# Patient Record
Sex: FEMALE | Race: WHITE | NOT HISPANIC OR LATINO | Employment: OTHER | URBAN - METROPOLITAN AREA
[De-identification: names, ages, dates, MRNs, and addresses within clinical notes are randomized per-mention and may not be internally consistent; named-entity substitution may affect disease eponyms.]

---

## 2017-03-10 ENCOUNTER — RECORDS - HEALTHEAST (OUTPATIENT)
Dept: LAB | Facility: HOSPITAL | Age: 52
End: 2017-03-10

## 2017-03-10 LAB
CHOLEST SERPL-MCNC: 203 MG/DL
FASTING STATUS PATIENT QL REPORTED: ABNORMAL
HDLC SERPL-MCNC: 35 MG/DL
LDLC SERPL CALC-MCNC: 148 MG/DL
TRIGL SERPL-MCNC: 102 MG/DL

## 2017-04-25 ENCOUNTER — HOSPITAL ENCOUNTER (OUTPATIENT)
Dept: INTERVENTIONAL RADIOLOGY/VASCULAR | Facility: CLINIC | Age: 52
Discharge: HOME | End: 2017-04-25
Attending: RADIOLOGY

## 2017-04-25 DIAGNOSIS — R63.30 FEEDING DIFFICULTIES: ICD-10-CM

## 2017-09-08 ENCOUNTER — RECORDS - HEALTHEAST (OUTPATIENT)
Dept: ADMINISTRATIVE | Facility: OTHER | Age: 52
End: 2017-09-08

## 2017-09-11 ENCOUNTER — HOSPITAL ENCOUNTER (OUTPATIENT)
Dept: INTERVENTIONAL RADIOLOGY/VASCULAR | Facility: HOSPITAL | Age: 52
Discharge: HOME | End: 2017-09-11
Attending: RADIOLOGY

## 2017-09-11 DIAGNOSIS — E46 MALNUTRITION (H): ICD-10-CM

## 2017-11-27 ENCOUNTER — RECORDS - HEALTHEAST (OUTPATIENT)
Dept: ADMINISTRATIVE | Facility: OTHER | Age: 52
End: 2017-11-27

## 2017-12-11 ENCOUNTER — HOSPITAL ENCOUNTER (OUTPATIENT)
Dept: INTERVENTIONAL RADIOLOGY/VASCULAR | Facility: HOSPITAL | Age: 52
Discharge: HOME | End: 2017-12-11
Attending: RADIOLOGY

## 2017-12-11 DIAGNOSIS — E46 MALNUTRITION (H): ICD-10-CM

## 2018-02-07 ENCOUNTER — RECORDS - HEALTHEAST (OUTPATIENT)
Dept: LAB | Facility: HOSPITAL | Age: 53
End: 2018-02-07

## 2018-02-08 LAB — LAMOTRIGINE SERPL-MCNC: 10.5 UG/ML (ref 2.5–15)

## 2018-02-12 ENCOUNTER — HOSPITAL ENCOUNTER (OUTPATIENT)
Dept: INTERVENTIONAL RADIOLOGY/VASCULAR | Facility: HOSPITAL | Age: 53
Discharge: HOME | End: 2018-02-12
Attending: RADIOLOGY | Admitting: RADIOLOGY

## 2018-02-12 ENCOUNTER — RECORDS - HEALTHEAST (OUTPATIENT)
Dept: ADMINISTRATIVE | Facility: OTHER | Age: 53
End: 2018-02-12

## 2018-02-12 DIAGNOSIS — R63.30 FEEDING DIFFICULTIES: ICD-10-CM

## 2018-02-13 ENCOUNTER — RECORDS - HEALTHEAST (OUTPATIENT)
Dept: LAB | Facility: HOSPITAL | Age: 53
End: 2018-02-13

## 2018-02-13 LAB
ALBUMIN SERPL-MCNC: 3.7 G/DL (ref 3.5–5)
ALP SERPL-CCNC: 217 U/L (ref 45–120)
ALT SERPL W P-5'-P-CCNC: <9 U/L (ref 0–45)
ANION GAP SERPL CALCULATED.3IONS-SCNC: 9 MMOL/L (ref 5–18)
AST SERPL W P-5'-P-CCNC: 21 U/L (ref 0–40)
BILIRUB SERPL-MCNC: 0.4 MG/DL (ref 0–1)
BUN SERPL-MCNC: 17 MG/DL (ref 8–22)
CALCIUM SERPL-MCNC: 10.2 MG/DL (ref 8.5–10.5)
CHLORIDE BLD-SCNC: 105 MMOL/L (ref 98–107)
CO2 SERPL-SCNC: 26 MMOL/L (ref 22–31)
CREAT SERPL-MCNC: 0.54 MG/DL (ref 0.6–1.1)
ERYTHROCYTE [DISTWIDTH] IN BLOOD BY AUTOMATED COUNT: 14.1 % (ref 11–14.5)
GFR SERPL CREATININE-BSD FRML MDRD: >60 ML/MIN/1.73M2
GLUCOSE BLD-MCNC: 97 MG/DL (ref 70–125)
HCT VFR BLD AUTO: 44.3 % (ref 35–47)
HGB BLD-MCNC: 14.4 G/DL (ref 12–16)
LEVETIRACETAM (KEPPRA): 21.5 UG/ML (ref 6–46)
MCH RBC QN AUTO: 30.7 PG (ref 27–34)
MCHC RBC AUTO-ENTMCNC: 32.5 G/DL (ref 32–36)
MCV RBC AUTO: 95 FL (ref 80–100)
PLATELET # BLD AUTO: 237 THOU/UL (ref 140–440)
PMV BLD AUTO: 11.5 FL (ref 8.5–12.5)
POTASSIUM BLD-SCNC: 3.8 MMOL/L (ref 3.5–5)
PROT SERPL-MCNC: 7.9 G/DL (ref 6–8)
RBC # BLD AUTO: 4.69 MILL/UL (ref 3.8–5.4)
SODIUM SERPL-SCNC: 140 MMOL/L (ref 136–145)
WBC: 6.7 THOU/UL (ref 4–11)

## 2018-02-14 LAB
LAMOTRIGINE SERPL-MCNC: 8.2 UG/ML (ref 2.5–15)
TOPIRAMATE SERPL-MCNC: 10.5 UG/ML (ref 5–20)

## 2018-02-17 ENCOUNTER — RECORDS - HEALTHEAST (OUTPATIENT)
Dept: LAB | Facility: HOSPITAL | Age: 53
End: 2018-02-17

## 2018-02-17 LAB
ALBUMIN SERPL-MCNC: 3.6 G/DL (ref 3.5–5)
ALP SERPL-CCNC: 231 U/L (ref 45–120)
ALT SERPL W P-5'-P-CCNC: 11 U/L (ref 0–45)
AST SERPL W P-5'-P-CCNC: 25 U/L (ref 0–40)
BILIRUB DIRECT SERPL-MCNC: <0.1 MG/DL
BILIRUB SERPL-MCNC: 0.3 MG/DL (ref 0–1)
PROT SERPL-MCNC: 7.1 G/DL (ref 6–8)

## 2018-04-20 ENCOUNTER — RECORDS - HEALTHEAST (OUTPATIENT)
Dept: LAB | Facility: CLINIC | Age: 53
End: 2018-04-20

## 2018-04-24 ENCOUNTER — RECORDS - HEALTHEAST (OUTPATIENT)
Dept: ADMINISTRATIVE | Facility: OTHER | Age: 53
End: 2018-04-24

## 2018-04-24 ENCOUNTER — AMBULATORY - HEALTHEAST (OUTPATIENT)
Dept: SCHEDULING | Facility: CLINIC | Age: 53
End: 2018-04-24

## 2018-04-24 DIAGNOSIS — M81.0 OSTEOPOROSIS: ICD-10-CM

## 2018-04-25 LAB
T4 FREE SERPL-MCNC: 1.1 NG/DL (ref 0.7–1.8)
TSH SERPL DL<=0.005 MIU/L-ACNC: 2.13 UIU/ML (ref 0.3–5)

## 2018-05-01 ENCOUNTER — RECORDS - HEALTHEAST (OUTPATIENT)
Dept: LAB | Facility: HOSPITAL | Age: 53
End: 2018-05-01

## 2018-05-01 LAB
IRON SATN MFR SERPL: 20 % (ref 20–50)
IRON SERPL-MCNC: 62 UG/DL (ref 42–175)
TIBC SERPL-MCNC: 309 UG/DL (ref 313–563)
TRANSFERRIN SERPL-MCNC: 248 MG/DL (ref 212–360)

## 2018-05-16 ENCOUNTER — HOSPITAL ENCOUNTER (OUTPATIENT)
Dept: INTERVENTIONAL RADIOLOGY/VASCULAR | Facility: HOSPITAL | Age: 53
Discharge: HOME | End: 2018-05-16
Attending: NURSE PRACTITIONER | Admitting: RADIOLOGY

## 2018-05-16 DIAGNOSIS — E46 MALNUTRITION, UNSPECIFIED TYPE (H): ICD-10-CM

## 2018-08-27 ENCOUNTER — HOSPITAL ENCOUNTER (OUTPATIENT)
Dept: INTERVENTIONAL RADIOLOGY/VASCULAR | Facility: HOSPITAL | Age: 53
Discharge: HOME | End: 2018-08-27
Attending: RADIOLOGY | Admitting: RADIOLOGY

## 2018-08-27 DIAGNOSIS — E46 MALNUTRITION (H): ICD-10-CM

## 2018-10-22 ENCOUNTER — RECORDS - HEALTHEAST (OUTPATIENT)
Dept: LAB | Facility: HOSPITAL | Age: 53
End: 2018-10-22

## 2018-10-22 LAB — LEVETIRACETAM (KEPPRA): 33.2 UG/ML (ref 6–46)

## 2018-10-27 ENCOUNTER — RECORDS - HEALTHEAST (OUTPATIENT)
Dept: LAB | Facility: HOSPITAL | Age: 53
End: 2018-10-27

## 2018-10-27 LAB
ALBUMIN UR-MCNC: NEGATIVE MG/DL
AMORPH CRY #/AREA URNS HPF: ABNORMAL /[HPF]
APPEARANCE UR: CLEAR
BACTERIA #/AREA URNS HPF: ABNORMAL HPF
BILIRUB UR QL STRIP: NEGATIVE
COLOR UR AUTO: YELLOW
GLUCOSE UR STRIP-MCNC: NEGATIVE MG/DL
HGB UR QL STRIP: NEGATIVE
KETONES UR STRIP-MCNC: NEGATIVE MG/DL
LEUKOCYTE ESTERASE UR QL STRIP: ABNORMAL
NITRATE UR QL: NEGATIVE
PH UR STRIP: 7.5 [PH] (ref 4.5–8)
RBC #/AREA URNS AUTO: ABNORMAL HPF
SP GR UR STRIP: 1.01 (ref 1–1.03)
SQUAMOUS #/AREA URNS AUTO: ABNORMAL LPF
UROBILINOGEN UR STRIP-ACNC: ABNORMAL
WBC #/AREA URNS AUTO: ABNORMAL HPF

## 2018-10-30 ENCOUNTER — AMBULATORY - HEALTHEAST (OUTPATIENT)
Dept: LAB | Facility: HOSPITAL | Age: 53
End: 2018-10-30

## 2018-10-30 ENCOUNTER — RECORDS - HEALTHEAST (OUTPATIENT)
Dept: LAB | Facility: HOSPITAL | Age: 53
End: 2018-10-30

## 2018-10-30 DIAGNOSIS — N31.9 NEUROGENIC DYSFUNCTION OF THE URINARY BLADDER: ICD-10-CM

## 2018-10-30 LAB
BACTERIA SPEC CULT: ABNORMAL
BACTERIA SPEC CULT: ABNORMAL
CREAT SERPL-MCNC: 0.52 MG/DL (ref 0.6–1.1)
GFR SERPL CREATININE-BSD FRML MDRD: >60 ML/MIN/1.73M2
IRON SATN MFR SERPL: 25 % (ref 20–50)
IRON SERPL-MCNC: 80 UG/DL (ref 42–175)
TIBC SERPL-MCNC: 315 UG/DL (ref 313–563)
TRANSFERRIN SERPL-MCNC: 252 MG/DL (ref 212–360)

## 2018-11-08 ENCOUNTER — RECORDS - HEALTHEAST (OUTPATIENT)
Dept: LAB | Facility: HOSPITAL | Age: 53
End: 2018-11-08

## 2018-11-08 LAB — LEVETIRACETAM (KEPPRA): 50.3 UG/ML (ref 6–46)

## 2018-11-09 LAB
LAMOTRIGINE SERPL-MCNC: 9.1 UG/ML (ref 2.5–15)
TOPIRAMATE SERPL-MCNC: 8.5 UG/ML (ref 5–20)

## 2018-11-13 ENCOUNTER — HOSPITAL ENCOUNTER (OUTPATIENT)
Dept: INTERVENTIONAL RADIOLOGY/VASCULAR | Facility: HOSPITAL | Age: 53
Discharge: HOME | End: 2018-11-13
Attending: RADIOLOGY | Admitting: RADIOLOGY

## 2018-11-13 DIAGNOSIS — R63.30 FEEDING DIFFICULTIES: ICD-10-CM

## 2019-01-08 ENCOUNTER — HOSPITAL ENCOUNTER (OUTPATIENT)
Dept: INTERVENTIONAL RADIOLOGY/VASCULAR | Facility: CLINIC | Age: 54
Discharge: HOME | End: 2019-01-08
Attending: RADIOLOGY | Admitting: RADIOLOGY

## 2019-01-08 DIAGNOSIS — E46 MALNUTRITION (H): ICD-10-CM

## 2019-02-06 ENCOUNTER — RECORDS - HEALTHEAST (OUTPATIENT)
Dept: LAB | Facility: HOSPITAL | Age: 54
End: 2019-02-06

## 2019-02-07 LAB — LAMOTRIGINE SERPL-MCNC: 8.5 UG/ML (ref 2.5–15)

## 2019-02-11 ENCOUNTER — RECORDS - HEALTHEAST (OUTPATIENT)
Dept: LAB | Facility: HOSPITAL | Age: 54
End: 2019-02-11

## 2019-02-11 LAB
ALBUMIN SERPL-MCNC: 3.6 G/DL (ref 3.5–5)
ALBUMIN SERPL-MCNC: 3.6 G/DL (ref 3.5–5)
ALP SERPL-CCNC: 261 U/L (ref 45–120)
ALT SERPL W P-5'-P-CCNC: 20 U/L (ref 0–45)
AMMONIA PLAS-SCNC: 67 UMOL/L (ref 11–35)
ANION GAP SERPL CALCULATED.3IONS-SCNC: 7 MMOL/L (ref 5–18)
AST SERPL W P-5'-P-CCNC: 35 U/L (ref 0–40)
BASOPHILS # BLD AUTO: 0.1 THOU/UL (ref 0–0.2)
BASOPHILS NFR BLD AUTO: 1 % (ref 0–2)
BILIRUB DIRECT SERPL-MCNC: <0.1 MG/DL
BILIRUB SERPL-MCNC: 0.2 MG/DL (ref 0–1)
BUN SERPL-MCNC: 23 MG/DL (ref 8–22)
CALCIUM SERPL-MCNC: 10.1 MG/DL (ref 8.5–10.5)
CHLORIDE BLD-SCNC: 107 MMOL/L (ref 98–107)
CHOLEST SERPL-MCNC: 181 MG/DL
CO2 SERPL-SCNC: 26 MMOL/L (ref 22–31)
CREAT SERPL-MCNC: 0.54 MG/DL (ref 0.6–1.1)
EOSINOPHIL # BLD AUTO: 0.2 THOU/UL (ref 0–0.4)
EOSINOPHIL NFR BLD AUTO: 3 % (ref 0–6)
ERYTHROCYTE [DISTWIDTH] IN BLOOD BY AUTOMATED COUNT: 13.2 % (ref 11–14.5)
FASTING STATUS PATIENT QL REPORTED: NO
GFR SERPL CREATININE-BSD FRML MDRD: >60 ML/MIN/1.73M2
GLUCOSE BLD-MCNC: 96 MG/DL (ref 70–125)
HCT VFR BLD AUTO: 42 % (ref 35–47)
HDLC SERPL-MCNC: 30 MG/DL
HGB BLD-MCNC: 13.9 G/DL (ref 12–16)
LDLC SERPL CALC-MCNC: 131 MG/DL
LYMPHOCYTES # BLD AUTO: 1.6 THOU/UL (ref 0.8–4.4)
LYMPHOCYTES NFR BLD AUTO: 30 % (ref 20–40)
MCH RBC QN AUTO: 31.7 PG (ref 27–34)
MCHC RBC AUTO-ENTMCNC: 33.1 G/DL (ref 32–36)
MCV RBC AUTO: 96 FL (ref 80–100)
MONOCYTES # BLD AUTO: 0.5 THOU/UL (ref 0–0.9)
MONOCYTES NFR BLD AUTO: 8 % (ref 2–10)
NEUTROPHILS # BLD AUTO: 3.2 THOU/UL (ref 2–7.7)
NEUTROPHILS NFR BLD AUTO: 58 % (ref 50–70)
PHOSPHATE SERPL-MCNC: 3.1 MG/DL (ref 2.5–4.5)
PLATELET # BLD AUTO: 262 THOU/UL (ref 140–440)
PMV BLD AUTO: 11.5 FL (ref 8.5–12.5)
POTASSIUM BLD-SCNC: 3.8 MMOL/L (ref 3.5–5)
PROT SERPL-MCNC: 7.4 G/DL (ref 6–8)
RBC # BLD AUTO: 4.38 MILL/UL (ref 3.8–5.4)
SODIUM SERPL-SCNC: 140 MMOL/L (ref 136–145)
TRIGL SERPL-MCNC: 101 MG/DL
WBC: 5.5 THOU/UL (ref 4–11)

## 2019-03-20 ENCOUNTER — RECORDS - HEALTHEAST (OUTPATIENT)
Dept: LAB | Facility: HOSPITAL | Age: 54
End: 2019-03-20

## 2019-03-20 LAB
ALBUMIN SERPL-MCNC: 3.7 G/DL (ref 3.5–5)
ALBUMIN SERPL-MCNC: 3.7 G/DL (ref 3.5–5)
ALP SERPL-CCNC: 270 U/L (ref 45–120)
ALT SERPL W P-5'-P-CCNC: 23 U/L (ref 0–45)
AMMONIA PLAS-SCNC: 41 UMOL/L (ref 11–35)
ANION GAP SERPL CALCULATED.3IONS-SCNC: 8 MMOL/L (ref 5–18)
AST SERPL W P-5'-P-CCNC: 42 U/L (ref 0–40)
BILIRUB DIRECT SERPL-MCNC: 0.1 MG/DL
BILIRUB SERPL-MCNC: 0.3 MG/DL (ref 0–1)
BUN SERPL-MCNC: 19 MG/DL (ref 8–22)
CALCIUM SERPL-MCNC: 10.2 MG/DL (ref 8.5–10.5)
CHLORIDE BLD-SCNC: 106 MMOL/L (ref 98–107)
CHOLEST SERPL-MCNC: 191 MG/DL
CO2 SERPL-SCNC: 26 MMOL/L (ref 22–31)
CREAT SERPL-MCNC: 0.54 MG/DL (ref 0.6–1.1)
ERYTHROCYTE [DISTWIDTH] IN BLOOD BY AUTOMATED COUNT: 13.4 % (ref 11–14.5)
FASTING STATUS PATIENT QL REPORTED: ABNORMAL
GFR SERPL CREATININE-BSD FRML MDRD: >60 ML/MIN/1.73M2
GLUCOSE BLD-MCNC: 100 MG/DL (ref 70–125)
HCT VFR BLD AUTO: 42.9 % (ref 35–47)
HDLC SERPL-MCNC: 36 MG/DL
HGB BLD-MCNC: 14.4 G/DL (ref 12–16)
LDLC SERPL CALC-MCNC: 134 MG/DL
MCH RBC QN AUTO: 31.6 PG (ref 27–34)
MCHC RBC AUTO-ENTMCNC: 33.6 G/DL (ref 32–36)
MCV RBC AUTO: 94 FL (ref 80–100)
PHOSPHATE SERPL-MCNC: 3.7 MG/DL (ref 2.5–4.5)
PLATELET # BLD AUTO: 214 THOU/UL (ref 140–440)
PMV BLD AUTO: 11.2 FL (ref 8.5–12.5)
POTASSIUM BLD-SCNC: 4 MMOL/L (ref 3.5–5)
PROT SERPL-MCNC: 7.4 G/DL (ref 6–8)
RBC # BLD AUTO: 4.55 MILL/UL (ref 3.8–5.4)
SODIUM SERPL-SCNC: 140 MMOL/L (ref 136–145)
TRIGL SERPL-MCNC: 105 MG/DL
WBC: 6.2 THOU/UL (ref 4–11)

## 2019-04-22 ENCOUNTER — RECORDS - HEALTHEAST (OUTPATIENT)
Dept: ADMINISTRATIVE | Facility: OTHER | Age: 54
End: 2019-04-22

## 2019-04-29 ENCOUNTER — HOSPITAL ENCOUNTER (OUTPATIENT)
Dept: INTERVENTIONAL RADIOLOGY/VASCULAR | Facility: HOSPITAL | Age: 54
Discharge: HOME | End: 2019-04-29
Attending: RADIOLOGY | Admitting: RADIOLOGY

## 2019-04-29 ENCOUNTER — RECORDS - HEALTHEAST (OUTPATIENT)
Dept: ADMINISTRATIVE | Facility: OTHER | Age: 54
End: 2019-04-29

## 2019-04-29 DIAGNOSIS — E46 MALNUTRITION (H): ICD-10-CM

## 2019-04-30 ENCOUNTER — COMMUNICATION - HEALTHEAST (OUTPATIENT)
Dept: ADMINISTRATIVE | Facility: CLINIC | Age: 54
End: 2019-04-30

## 2019-04-30 ENCOUNTER — RECORDS - HEALTHEAST (OUTPATIENT)
Dept: LAB | Facility: HOSPITAL | Age: 54
End: 2019-04-30

## 2019-04-30 LAB
IRON SATN MFR SERPL: 20 % (ref 20–50)
IRON SERPL-MCNC: 61 UG/DL (ref 42–175)
TIBC SERPL-MCNC: 306 UG/DL (ref 313–563)
TRANSFERRIN SERPL-MCNC: 245 MG/DL (ref 212–360)

## 2019-05-01 ENCOUNTER — RECORDS - HEALTHEAST (OUTPATIENT)
Dept: ADMINISTRATIVE | Facility: OTHER | Age: 54
End: 2019-05-01

## 2019-05-08 ENCOUNTER — RECORDS - HEALTHEAST (OUTPATIENT)
Dept: LAB | Facility: CLINIC | Age: 54
End: 2019-05-08

## 2019-05-09 LAB
ALBUMIN SERPL-MCNC: 3.8 G/DL (ref 3.5–5)
ALP SERPL-CCNC: 272 U/L (ref 45–120)
ALT SERPL W P-5'-P-CCNC: 30 U/L (ref 0–45)
ANION GAP SERPL CALCULATED.3IONS-SCNC: 8 MMOL/L (ref 5–18)
AST SERPL W P-5'-P-CCNC: 51 U/L (ref 0–40)
BILIRUB SERPL-MCNC: 0.2 MG/DL (ref 0–1)
BUN SERPL-MCNC: 17 MG/DL (ref 8–22)
CALCIUM SERPL-MCNC: 10.6 MG/DL (ref 8.5–10.5)
CHLORIDE BLD-SCNC: 106 MMOL/L (ref 98–107)
CHOLEST SERPL-MCNC: 211 MG/DL
CO2 SERPL-SCNC: 27 MMOL/L (ref 22–31)
CREAT SERPL-MCNC: 0.55 MG/DL (ref 0.6–1.1)
ERYTHROCYTE [DISTWIDTH] IN BLOOD BY AUTOMATED COUNT: 13.3 % (ref 11–14.5)
FASTING STATUS PATIENT QL REPORTED: ABNORMAL
GFR SERPL CREATININE-BSD FRML MDRD: >60 ML/MIN/1.73M2
GLUCOSE BLD-MCNC: 90 MG/DL (ref 70–125)
HCT VFR BLD AUTO: 46.3 % (ref 35–47)
HDLC SERPL-MCNC: 41 MG/DL
HGB BLD-MCNC: 15.1 G/DL (ref 12–16)
LDLC SERPL CALC-MCNC: 148 MG/DL
MCH RBC QN AUTO: 31.9 PG (ref 27–34)
MCHC RBC AUTO-ENTMCNC: 32.6 G/DL (ref 32–36)
MCV RBC AUTO: 98 FL (ref 80–100)
PLATELET # BLD AUTO: 227 THOU/UL (ref 140–440)
PMV BLD AUTO: 11.7 FL (ref 8.5–12.5)
POTASSIUM BLD-SCNC: 4.2 MMOL/L (ref 3.5–5)
PROT SERPL-MCNC: 7.8 G/DL (ref 6–8)
RBC # BLD AUTO: 4.73 MILL/UL (ref 3.8–5.4)
SODIUM SERPL-SCNC: 141 MMOL/L (ref 136–145)
TRIGL SERPL-MCNC: 111 MG/DL
TSH SERPL DL<=0.005 MIU/L-ACNC: 3 UIU/ML (ref 0.3–5)
WBC: 5.2 THOU/UL (ref 4–11)

## 2019-06-11 ENCOUNTER — AMBULATORY - HEALTHEAST (OUTPATIENT)
Dept: ENDOCRINOLOGY | Facility: CLINIC | Age: 54
End: 2019-06-11

## 2019-06-11 DIAGNOSIS — M85.80 OSTEOPENIA, UNSPECIFIED LOCATION: ICD-10-CM

## 2019-06-25 ENCOUNTER — RECORDS - HEALTHEAST (OUTPATIENT)
Dept: ADMINISTRATIVE | Facility: OTHER | Age: 54
End: 2019-06-25

## 2019-06-25 ENCOUNTER — HOSPITAL ENCOUNTER (OUTPATIENT)
Dept: INTERVENTIONAL RADIOLOGY/VASCULAR | Facility: HOSPITAL | Age: 54
Discharge: HOME | End: 2019-06-25
Attending: RADIOLOGY | Admitting: RADIOLOGY

## 2019-06-25 DIAGNOSIS — E46 MALNUTRITION (H): ICD-10-CM

## 2019-07-09 ENCOUNTER — ALLIED HEALTH/NURSE VISIT (OUTPATIENT)
Dept: PHARMACY | Facility: PHYSICIAN GROUP | Age: 54
End: 2019-07-09
Payer: MEDICAID

## 2019-07-09 DIAGNOSIS — K21.9 GASTROESOPHAGEAL REFLUX DISEASE WITHOUT ESOPHAGITIS: ICD-10-CM

## 2019-07-09 DIAGNOSIS — R82.71 BACTERIA IN URINE: ICD-10-CM

## 2019-07-09 DIAGNOSIS — R56.9 SEIZURES (H): Primary | ICD-10-CM

## 2019-07-09 DIAGNOSIS — H04.123 DRY EYES: ICD-10-CM

## 2019-07-09 DIAGNOSIS — Z78.9 TAKES DIETARY SUPPLEMENTS: ICD-10-CM

## 2019-07-09 DIAGNOSIS — E03.9 HYPOTHYROIDISM, UNSPECIFIED TYPE: ICD-10-CM

## 2019-07-09 DIAGNOSIS — L29.9 ITCHING: ICD-10-CM

## 2019-07-09 DIAGNOSIS — R52 PAIN: ICD-10-CM

## 2019-07-09 DIAGNOSIS — K59.00 CONSTIPATION, UNSPECIFIED CONSTIPATION TYPE: ICD-10-CM

## 2019-07-09 PROCEDURE — 99605 MTMS BY PHARM NP 15 MIN: CPT | Performed by: PHARMACIST

## 2019-07-09 PROCEDURE — 99607 MTMS BY PHARM ADDL 15 MIN: CPT | Performed by: PHARMACIST

## 2019-07-09 RX ORDER — DESONIDE 0.5 MG/G
CREAM TOPICAL 2 TIMES DAILY
COMMUNITY

## 2019-07-09 RX ORDER — MULTIPLE VITAMINS W/ MINERALS TAB 9MG-400MCG
1 TAB ORAL DAILY
COMMUNITY

## 2019-07-09 RX ORDER — LAMOTRIGINE 25 MG/1
75 TABLET ORAL 2 TIMES DAILY
COMMUNITY
End: 2021-04-15

## 2019-07-09 RX ORDER — GUAIFENESIN AND DEXTROMETHORPHAN HYDROBROMIDE 100; 10 MG/5ML; MG/5ML
10 SOLUTION ORAL EVERY 6 HOURS PRN
COMMUNITY

## 2019-07-09 RX ORDER — CALCIUM CARBONATE 1250 MG/5ML
600 SUSPENSION ORAL DAILY
COMMUNITY
End: 2024-03-05

## 2019-07-09 RX ORDER — LEVOTHYROXINE SODIUM 50 UG/1
50 TABLET ORAL DAILY
COMMUNITY

## 2019-07-09 RX ORDER — BENZOCAINE/MENTHOL 6 MG-10 MG
LOZENGE MUCOUS MEMBRANE 2 TIMES DAILY PRN
COMMUNITY
End: 2024-03-05

## 2019-07-09 RX ORDER — ACETAMINOPHEN 325 MG/1
650 TABLET ORAL EVERY 4 HOURS PRN
COMMUNITY

## 2019-07-09 RX ORDER — BISACODYL 10 MG
10 SUPPOSITORY, RECTAL RECTAL EVERY OTHER DAY
COMMUNITY

## 2019-07-09 RX ORDER — NYSTATIN 100000 U/G
CREAM TOPICAL 3 TIMES DAILY PRN
COMMUNITY
End: 2023-10-26

## 2019-07-09 RX ORDER — DIPHENHYDRAMINE HCL 12.5MG/5ML
25 LIQUID (ML) ORAL EVERY 4 HOURS PRN
COMMUNITY

## 2019-07-09 RX ORDER — TOPIRAMATE 200 MG/1
200 TABLET, FILM COATED ORAL 2 TIMES DAILY
COMMUNITY
End: 2021-03-17

## 2019-07-09 RX ORDER — NUTRITIONAL SUPPLEMENT
1 PACKET (EA) ORAL 2 TIMES DAILY
COMMUNITY

## 2019-07-09 RX ORDER — DIAZEPAM ORAL SOLUTION (CONCENTRATE) 5 MG/ML
5 SOLUTION ORAL EVERY 8 HOURS PRN
COMMUNITY

## 2019-07-09 RX ORDER — FOLIC ACID 1 MG/1
1 TABLET ORAL DAILY
COMMUNITY

## 2019-07-09 RX ORDER — CARBOXYMETHYLCELLULOSE SODIUM 5 MG/ML
1 SOLUTION/ DROPS OPHTHALMIC AT BEDTIME
COMMUNITY

## 2019-07-09 RX ORDER — LEVETIRACETAM 100 MG/ML
1000 SOLUTION ORAL 2 TIMES DAILY
COMMUNITY
End: 2021-03-17

## 2019-07-09 RX ORDER — WHEY PROTEIN ISOLATE 6 G-25/7 G
7 POWDER (GRAM) ORAL 2 TIMES DAILY
COMMUNITY

## 2019-07-09 RX ORDER — CLONAZEPAM 0.5 MG/1
0.25 TABLET ORAL AT BEDTIME
COMMUNITY
End: 2024-03-05

## 2019-07-09 RX ORDER — POLYETHYLENE GLYCOL 3350 17 G/17G
1 POWDER, FOR SOLUTION ORAL 2 TIMES DAILY PRN
COMMUNITY
End: 2024-03-05

## 2019-07-09 RX ORDER — LAMOTRIGINE 200 MG/1
200 TABLET ORAL 2 TIMES DAILY
COMMUNITY
End: 2021-03-17

## 2019-07-09 RX ORDER — NITROFURANTOIN 25 MG/5ML
100 SUSPENSION ORAL 4 TIMES DAILY
COMMUNITY
End: 2024-03-05

## 2019-07-09 NOTE — PROGRESS NOTES
SUBJECTIVE/OBJECTIVE:                Elysia Arellano is a 53 year old female called for a transitions of care visit.  She was discharged from [facility] on 7/5 for breakthrough seizure.  Spoke with nurse Zack, he had little time today but sent me her the list of her current orders for medications.      Chief Complaint: Medication review.    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 Forman  Fecal impaction in past  Scoliosis  GERD - S/P Nissen  Mental retardation, severe. Cerebral palsy  Cerebral Palsy  severe osteoporosis  recurrent pneumonia and UTIs    Medication Adherence/Access:  Medications are administered by group home staff.    Seizures:   Currently taking Topiramate 200mg daily (new),  Levetiracetam 1000mg BID, Lamotrigine 275mg BID, Clonazepam 0.25mg HS.  She also has Diazepam intensol 5mg PRN for seizure, and Diazepam 10mg rectal kit PRN.  She hasn't has any seizures since her discharge to home.      Bacteriuria:   Currently taking Nitrofurantoin 100mg QID.  It was noted that the seizure could have been caused by UTI, so nitrofurantoin was started.    Hypothyroidism:   Patient is taking levothyroxine 50 mcg daily. Patient is having the following symptoms: none.    TSH - 05/09/2019      NAME VALUE REFERENCE RANGE    THYROID STIMULATING HORMONE 3.00        GERD:    Currently taking ranitidine 150mg BID.   Unkown if she is having symptoms of GERD.    Constipation/GI:    Currently taking Fleet enema every other day if suppository not effective, Miralax powder 17g BID PRN, Bisacodyl 10mg suppository every other day.  The nurse I spoke with wasn't sure of how often she is having bowel movements.    Itching/Rash:   Benadryl 25mg every  4 hours PRN, Hydrocortisone 1% BID PRN, Desonide 0.05%  BID PRN for rash, Nystatin cream TID to face and BID to G-tube site.  The nurse didn't know how often she has been using these PRN topicals or Benadryl.  He didn't have access to the MAR at the time.    Nutrition/G-Tube: Currently taking via G-tube Cerovite multivtamin daily, Vitamin D3 2000 international unit(s) daily, calcium carbonate 1500mg daily, Arginaid 1 packet daily, Genfiber 2 tbs BID, and folic acid 1 mg daily.    Pain:   Currently taking acetaminophen 650mg every 4 hours PRN for pain.  UnknoPMwn how often this is given.    Dry Eye:    Currently taking Refresh liquigel 1 drop HS.      Today's Vitals: There were no vitals taken for this visit. - telephone encounter.      ASSESSMENT:                 Current medications were reviewed today.      Medication Adherence: excellent, no issues identified    Seizures:   Stable, follow up scheduled.    Unclear how they are administering the topiramate tablets, but it is recommended to crush the tablets and administer with water.    Bacteriuria:   Plan in place.  No concerns with drug interactions.  All medications are administered appropriately via G-tube.  Nitrofurantoin capsules can be opened and the powder administered via G-tube.  The pt was prescribed the liquid formulation, but it being given the capsules at her group home.    Hypothyroidism:   Stable. Last TSH is within normal limits.       GERD:    Unknown.  There is a liquid formulation of ranitidine available if preferred.    Constipation/GI:    Unknown if stable.    Itching/Rash:   Stable.  There is a drug interaction between benadryl and topiramate, requires additional monitoring but no adjustment in therapy at this time.   Antihistamines may potentiate the risk of oligohidrosis and hyperthermia associated occasionally with the use of topiramate    Nutrition/G-Tube:   Stable.    Pain:   Stable.    Dry Eye:    Stable.  Pt is being given Refresh  Ligui-gel drops, and her prescription is for the Refresh PM ointment.  If she is still having dry eye could correct this discrepancy.         PLAN:                  Post Discharge Medication Reconciliation Status: discharge medications reconciled, continue medications without change.    1) Continue current medications.    2) Monitor for symptoms of oligohidrosis and hyperthermia due to drug interaction between Benadryl and topiramate.  Benadryl can increase the risk of these side effects occurring, but likely doesn't need a change at this time.    Just an FYI -- Noted a few discrepancies for which medications the pt is getting.  Pt is getting Refresh liqui-gel drops instead of ointment, which may not be as effective.  Pt is getting nitrofurantoin capsules, and the liquid formulation was ordered.  She is getting the same dose, and the capsules can be opened and given via G-tube      I spent 60 minutes with this patient today. I offer these suggestions for consideration by Dr. Clement. A copy of the visit note was provided to the patient's primary care provider.    No follow up needed from [facility].    The patient declined a summary of these recommendations as an after visit summary.    Olga Magana, PharmD  Medication Therapy Management Pharmacist  Pager: [phone]

## 2019-07-11 ENCOUNTER — AMBULATORY - HEALTHEAST (OUTPATIENT)
Dept: OTHER | Facility: CLINIC | Age: 54
End: 2019-07-11

## 2019-07-11 ENCOUNTER — COMMUNICATION - HEALTHEAST (OUTPATIENT)
Dept: ENDOCRINOLOGY | Facility: CLINIC | Age: 54
End: 2019-07-11

## 2019-07-11 ENCOUNTER — DOCUMENTATION ONLY (OUTPATIENT)
Dept: OTHER | Facility: CLINIC | Age: 54
End: 2019-07-11

## 2019-07-19 ENCOUNTER — RECORDS - HEALTHEAST (OUTPATIENT)
Dept: LAB | Facility: HOSPITAL | Age: 54
End: 2019-07-19

## 2019-07-19 LAB
ALBUMIN UR-MCNC: NEGATIVE MG/DL
APPEARANCE UR: ABNORMAL
BACTERIA #/AREA URNS HPF: ABNORMAL HPF
BILIRUB UR QL STRIP: NEGATIVE
COLOR UR AUTO: YELLOW
GLUCOSE UR STRIP-MCNC: NEGATIVE MG/DL
HGB UR QL STRIP: NEGATIVE
KETONES UR STRIP-MCNC: NEGATIVE MG/DL
LEUKOCYTE ESTERASE UR QL STRIP: ABNORMAL
MUCOUS THREADS #/AREA URNS LPF: ABNORMAL LPF
NITRATE UR QL: POSITIVE
PH UR STRIP: 8 [PH] (ref 4.5–8)
RBC #/AREA URNS AUTO: ABNORMAL HPF
SP GR UR STRIP: 1.01 (ref 1–1.03)
SQUAMOUS #/AREA URNS AUTO: >100 LPF
UROBILINOGEN UR STRIP-ACNC: ABNORMAL
WBC #/AREA URNS AUTO: ABNORMAL HPF

## 2019-08-27 ENCOUNTER — OFFICE VISIT - HEALTHEAST (OUTPATIENT)
Dept: PODIATRY | Facility: CLINIC | Age: 54
End: 2019-08-27

## 2019-08-27 DIAGNOSIS — L03.031 CELLULITIS OF FIFTH TOE, RIGHT: ICD-10-CM

## 2019-08-27 ASSESSMENT — MIFFLIN-ST. JEOR: SCORE: 1045.87

## 2019-08-28 ENCOUNTER — COMMUNICATION - HEALTHEAST (OUTPATIENT)
Dept: ADMINISTRATIVE | Facility: CLINIC | Age: 54
End: 2019-08-28

## 2019-09-12 ENCOUNTER — HOSPITAL ENCOUNTER (OUTPATIENT)
Dept: MRI IMAGING | Facility: HOSPITAL | Age: 54
Discharge: HOME | End: 2019-09-12
Attending: PODIATRIST

## 2019-09-12 ENCOUNTER — HOSPITAL ENCOUNTER (OUTPATIENT)
Dept: RADIOLOGY | Facility: HOSPITAL | Age: 54
Discharge: HOME | End: 2019-09-12
Attending: PODIATRIST

## 2019-09-12 DIAGNOSIS — L03.031 CELLULITIS OF FIFTH TOE, RIGHT: ICD-10-CM

## 2019-09-17 ENCOUNTER — OFFICE VISIT - HEALTHEAST (OUTPATIENT)
Dept: PODIATRY | Facility: CLINIC | Age: 54
End: 2019-09-17

## 2019-09-17 ENCOUNTER — AMBULATORY - HEALTHEAST (OUTPATIENT)
Dept: LAB | Facility: CLINIC | Age: 54
End: 2019-09-17

## 2019-09-17 DIAGNOSIS — M85.80 OSTEOPENIA, UNSPECIFIED LOCATION: ICD-10-CM

## 2019-09-17 DIAGNOSIS — M86.9 OSTEOMYELITIS OF FIFTH TOE OF RIGHT FOOT (H): ICD-10-CM

## 2019-09-17 LAB — CALCIUM SERPL-MCNC: 10.3 MG/DL (ref 8.5–10.5)

## 2019-09-17 ASSESSMENT — MIFFLIN-ST. JEOR: SCORE: 1045.87

## 2019-09-18 LAB — 25(OH)D3 SERPL-MCNC: 65.2 NG/ML (ref 30–80)

## 2019-09-24 ENCOUNTER — HOSPITAL ENCOUNTER (OUTPATIENT)
Dept: INTERVENTIONAL RADIOLOGY/VASCULAR | Facility: HOSPITAL | Age: 54
Discharge: HOME | End: 2019-09-24
Admitting: RADIOLOGY

## 2019-09-24 DIAGNOSIS — R13.10 DYSPHAGIA, UNSPECIFIED TYPE: ICD-10-CM

## 2019-09-26 ENCOUNTER — COMMUNICATION - HEALTHEAST (OUTPATIENT)
Dept: INFECTIOUS DISEASES | Facility: CLINIC | Age: 54
End: 2019-09-26

## 2019-09-30 ENCOUNTER — RECORDS - HEALTHEAST (OUTPATIENT)
Dept: LAB | Facility: HOSPITAL | Age: 54
End: 2019-09-30

## 2019-10-01 LAB — LAMOTRIGINE SERPL-MCNC: 8.8 UG/ML (ref 2.5–15)

## 2019-10-04 ENCOUNTER — RECORDS - HEALTHEAST (OUTPATIENT)
Dept: LAB | Facility: CLINIC | Age: 54
End: 2019-10-04

## 2019-10-07 ENCOUNTER — COMMUNICATION - HEALTHEAST (OUTPATIENT)
Dept: VASCULAR SURGERY | Facility: CLINIC | Age: 54
End: 2019-10-07

## 2019-10-07 ENCOUNTER — OFFICE VISIT - HEALTHEAST (OUTPATIENT)
Dept: INFECTIOUS DISEASES | Facility: CLINIC | Age: 54
End: 2019-10-07

## 2019-10-07 ENCOUNTER — COMMUNICATION - HEALTHEAST (OUTPATIENT)
Dept: PODIATRY | Facility: CLINIC | Age: 54
End: 2019-10-07

## 2019-10-07 ENCOUNTER — COMMUNICATION - HEALTHEAST (OUTPATIENT)
Dept: ADMINISTRATIVE | Facility: CLINIC | Age: 54
End: 2019-10-07

## 2019-10-07 ENCOUNTER — RECORDS - HEALTHEAST (OUTPATIENT)
Dept: ADMINISTRATIVE | Facility: OTHER | Age: 54
End: 2019-10-07

## 2019-10-07 ENCOUNTER — HOSPITAL ENCOUNTER (OUTPATIENT)
Dept: RADIOLOGY | Facility: HOSPITAL | Age: 54
Discharge: HOME | End: 2019-10-07

## 2019-10-07 ENCOUNTER — AMBULATORY - HEALTHEAST (OUTPATIENT)
Dept: LAB | Facility: CLINIC | Age: 54
End: 2019-10-07

## 2019-10-07 DIAGNOSIS — T14.8XXA WOUND INFECTION: ICD-10-CM

## 2019-10-07 DIAGNOSIS — M86.9 OSTEOMYELITIS OF FIFTH TOE OF RIGHT FOOT (H): ICD-10-CM

## 2019-10-07 DIAGNOSIS — L08.9 WOUND INFECTION: ICD-10-CM

## 2019-10-07 LAB
C REACTIVE PROTEIN LHE: <0.1 MG/DL (ref 0–0.8)
LEVETIRACETAM (KEPPRA): 53 UG/ML (ref 6–46)
TSH SERPL DL<=0.005 MIU/L-ACNC: 4.63 UIU/ML (ref 0.3–5)

## 2019-10-08 ENCOUNTER — RECORDS - HEALTHEAST (OUTPATIENT)
Dept: LAB | Facility: HOSPITAL | Age: 54
End: 2019-10-08

## 2019-10-08 LAB
ALBUMIN SERPL-MCNC: 3.8 G/DL (ref 3.5–5)
ALP SERPL-CCNC: 264 U/L (ref 45–120)
ALT SERPL W P-5'-P-CCNC: 13 U/L (ref 0–45)
ANION GAP SERPL CALCULATED.3IONS-SCNC: 7 MMOL/L (ref 5–18)
AST SERPL W P-5'-P-CCNC: 31 U/L (ref 0–40)
BILIRUB SERPL-MCNC: 0.2 MG/DL (ref 0–1)
BUN SERPL-MCNC: 19 MG/DL (ref 8–22)
CALCIUM SERPL-MCNC: 9.9 MG/DL (ref 8.5–10.5)
CHLORIDE BLD-SCNC: 107 MMOL/L (ref 98–107)
CO2 SERPL-SCNC: 29 MMOL/L (ref 22–31)
CREAT SERPL-MCNC: 0.55 MG/DL (ref 0.6–1.1)
ERYTHROCYTE [DISTWIDTH] IN BLOOD BY AUTOMATED COUNT: 13 % (ref 11–14.5)
GFR SERPL CREATININE-BSD FRML MDRD: >60 ML/MIN/1.73M2
GLUCOSE BLD-MCNC: 92 MG/DL (ref 70–125)
HCT VFR BLD AUTO: 44 % (ref 35–47)
HGB BLD-MCNC: 14.1 G/DL (ref 12–16)
LEVETIRACETAM (KEPPRA): 55.6 UG/ML (ref 6–46)
MCH RBC QN AUTO: 31.6 PG (ref 27–34)
MCHC RBC AUTO-ENTMCNC: 32 G/DL (ref 32–36)
MCV RBC AUTO: 99 FL (ref 80–100)
PLATELET # BLD AUTO: 261 THOU/UL (ref 140–440)
PMV BLD AUTO: 11.4 FL (ref 8.5–12.5)
POTASSIUM BLD-SCNC: 3.8 MMOL/L (ref 3.5–5)
PROT SERPL-MCNC: 7.4 G/DL (ref 6–8)
RBC # BLD AUTO: 4.46 MILL/UL (ref 3.8–5.4)
SODIUM SERPL-SCNC: 143 MMOL/L (ref 136–145)
WBC: 5 THOU/UL (ref 4–11)

## 2019-10-09 LAB
BACTERIA SPEC CULT: ABNORMAL
BACTERIA SPEC CULT: ABNORMAL
GRAM STAIN RESULT: ABNORMAL
GRAM STAIN RESULT: ABNORMAL
LAMOTRIGINE SERPL-MCNC: 9.6 UG/ML (ref 2.5–15)
TOPIRAMATE SERPL-MCNC: 13.8 UG/ML (ref 5–20)

## 2019-10-29 ENCOUNTER — RECORDS - HEALTHEAST (OUTPATIENT)
Dept: LAB | Facility: HOSPITAL | Age: 54
End: 2019-10-29

## 2019-10-29 LAB
IRON SATN MFR SERPL: 20 % (ref 20–50)
IRON SERPL-MCNC: 57 UG/DL (ref 42–175)
TIBC SERPL-MCNC: 290 UG/DL (ref 313–563)
TRANSFERRIN SERPL-MCNC: 232 MG/DL (ref 212–360)

## 2019-11-04 ENCOUNTER — OFFICE VISIT - HEALTHEAST (OUTPATIENT)
Dept: INFECTIOUS DISEASES | Facility: CLINIC | Age: 54
End: 2019-11-04

## 2019-11-04 ENCOUNTER — RECORDS - HEALTHEAST (OUTPATIENT)
Dept: ADMINISTRATIVE | Facility: OTHER | Age: 54
End: 2019-11-04

## 2019-11-04 ENCOUNTER — AMBULATORY - HEALTHEAST (OUTPATIENT)
Dept: LAB | Facility: CLINIC | Age: 54
End: 2019-11-04

## 2019-11-04 DIAGNOSIS — M86.9 OSTEOMYELITIS OF FIFTH TOE OF RIGHT FOOT (H): ICD-10-CM

## 2019-11-04 LAB
BASOPHILS # BLD AUTO: 0 THOU/UL (ref 0–0.2)
BASOPHILS NFR BLD AUTO: 1 % (ref 0–2)
C REACTIVE PROTEIN LHE: <0.1 MG/DL (ref 0–0.8)
EOSINOPHIL # BLD AUTO: 0.1 THOU/UL (ref 0–0.4)
EOSINOPHIL NFR BLD AUTO: 2 % (ref 0–6)
ERYTHROCYTE [DISTWIDTH] IN BLOOD BY AUTOMATED COUNT: 10.9 % (ref 11–14.5)
HCT VFR BLD AUTO: 42.3 % (ref 35–47)
HGB BLD-MCNC: 13.9 G/DL (ref 12–16)
LYMPHOCYTES # BLD AUTO: 1.9 THOU/UL (ref 0.8–4.4)
LYMPHOCYTES NFR BLD AUTO: 32 % (ref 20–40)
MCH RBC QN AUTO: 31.6 PG (ref 27–34)
MCHC RBC AUTO-ENTMCNC: 32.9 G/DL (ref 32–36)
MCV RBC AUTO: 96 FL (ref 80–100)
MONOCYTES # BLD AUTO: 0.5 THOU/UL (ref 0–0.9)
MONOCYTES NFR BLD AUTO: 9 % (ref 2–10)
NEUTROPHILS # BLD AUTO: 3.3 THOU/UL (ref 2–7.7)
NEUTROPHILS NFR BLD AUTO: 57 % (ref 50–70)
PLATELET # BLD AUTO: 214 THOU/UL (ref 140–440)
PMV BLD AUTO: 9.1 FL (ref 7–10)
RBC # BLD AUTO: 4.4 MILL/UL (ref 3.8–5.4)
WBC: 5.9 THOU/UL (ref 4–11)

## 2019-11-07 LAB
BACTERIA SPEC CULT: ABNORMAL
BACTERIA SPEC CULT: ABNORMAL
GRAM STAIN RESULT: ABNORMAL

## 2019-11-14 ENCOUNTER — RECORDS - HEALTHEAST (OUTPATIENT)
Dept: ADMINISTRATIVE | Facility: OTHER | Age: 54
End: 2019-11-14

## 2019-11-14 ENCOUNTER — HOSPITAL ENCOUNTER (OUTPATIENT)
Dept: MRI IMAGING | Facility: HOSPITAL | Age: 54
Discharge: HOME | End: 2019-11-14

## 2019-11-14 DIAGNOSIS — M86.9 OSTEOMYELITIS OF FIFTH TOE OF RIGHT FOOT (H): ICD-10-CM

## 2019-11-15 ENCOUNTER — DOCUMENTATION ONLY (OUTPATIENT)
Dept: OTHER | Facility: CLINIC | Age: 54
End: 2019-11-15

## 2019-11-15 ENCOUNTER — AMBULATORY - HEALTHEAST (OUTPATIENT)
Dept: OTHER | Facility: CLINIC | Age: 54
End: 2019-11-15

## 2019-11-18 ENCOUNTER — OFFICE VISIT - HEALTHEAST (OUTPATIENT)
Dept: INFECTIOUS DISEASES | Facility: CLINIC | Age: 54
End: 2019-11-18

## 2019-11-18 DIAGNOSIS — M86.9 OSTEOMYELITIS OF FIFTH TOE OF RIGHT FOOT (H): ICD-10-CM

## 2019-11-18 ASSESSMENT — MIFFLIN-ST. JEOR: SCORE: 1031.36

## 2019-11-20 ENCOUNTER — COMMUNICATION - HEALTHEAST (OUTPATIENT)
Dept: ADMINISTRATIVE | Facility: CLINIC | Age: 54
End: 2019-11-20

## 2019-11-20 ENCOUNTER — SURGERY - HEALTHEAST (OUTPATIENT)
Dept: PODIATRY | Facility: CLINIC | Age: 54
End: 2019-11-20

## 2019-11-20 DIAGNOSIS — M86.9 OSTEOMYELITIS OF FIFTH TOE OF RIGHT FOOT (H): ICD-10-CM

## 2019-11-20 ASSESSMENT — MIFFLIN-ST. JEOR: SCORE: 1076.27

## 2019-11-21 ENCOUNTER — SURGERY - HEALTHEAST (OUTPATIENT)
Dept: SURGERY | Facility: HOSPITAL | Age: 54
End: 2019-11-21

## 2019-11-21 ENCOUNTER — ANESTHESIA - HEALTHEAST (OUTPATIENT)
Dept: SURGERY | Facility: HOSPITAL | Age: 54
End: 2019-11-21

## 2019-11-21 ASSESSMENT — MIFFLIN-ST. JEOR: SCORE: 1040.88

## 2019-12-03 ENCOUNTER — COMMUNICATION - HEALTHEAST (OUTPATIENT)
Dept: INFECTIOUS DISEASES | Facility: CLINIC | Age: 54
End: 2019-12-03

## 2019-12-05 ENCOUNTER — OFFICE VISIT - HEALTHEAST (OUTPATIENT)
Dept: PODIATRY | Facility: CLINIC | Age: 54
End: 2019-12-05

## 2019-12-05 DIAGNOSIS — M86.9 OSTEOMYELITIS OF FIFTH TOE OF RIGHT FOOT (H): ICD-10-CM

## 2019-12-05 ASSESSMENT — MIFFLIN-ST. JEOR: SCORE: 1036.8

## 2019-12-09 ENCOUNTER — RECORDS - HEALTHEAST (OUTPATIENT)
Dept: ADMINISTRATIVE | Facility: OTHER | Age: 54
End: 2019-12-09

## 2019-12-09 ENCOUNTER — COMMUNICATION - HEALTHEAST (OUTPATIENT)
Dept: INFECTIOUS DISEASES | Facility: CLINIC | Age: 54
End: 2019-12-09

## 2019-12-09 ENCOUNTER — OFFICE VISIT - HEALTHEAST (OUTPATIENT)
Dept: INFECTIOUS DISEASES | Facility: CLINIC | Age: 54
End: 2019-12-09

## 2019-12-09 DIAGNOSIS — M86.9 OSTEOMYELITIS OF FIFTH TOE OF RIGHT FOOT (H): ICD-10-CM

## 2020-01-27 ENCOUNTER — RECORDS - HEALTHEAST (OUTPATIENT)
Dept: ADMINISTRATIVE | Facility: OTHER | Age: 55
End: 2020-01-27

## 2020-01-29 ENCOUNTER — HOSPITAL ENCOUNTER (OUTPATIENT)
Dept: INTERVENTIONAL RADIOLOGY/VASCULAR | Facility: HOSPITAL | Age: 55
Discharge: HOME | End: 2020-01-29
Admitting: RADIOLOGY

## 2020-01-29 DIAGNOSIS — R13.10 DYSPHAGIA, UNSPECIFIED TYPE: ICD-10-CM

## 2020-02-17 ENCOUNTER — RECORDS - HEALTHEAST (OUTPATIENT)
Dept: LAB | Facility: HOSPITAL | Age: 55
End: 2020-02-17

## 2020-02-17 LAB
ALBUMIN SERPL-MCNC: 3.7 G/DL (ref 3.5–5)
ALBUMIN SERPL-MCNC: 3.7 G/DL (ref 3.5–5)
ALP SERPL-CCNC: 235 U/L (ref 45–120)
ALT SERPL W P-5'-P-CCNC: <9 U/L (ref 0–45)
AMMONIA PLAS-SCNC: 58 UMOL/L (ref 11–35)
ANION GAP SERPL CALCULATED.3IONS-SCNC: 3 MMOL/L (ref 5–18)
AST SERPL W P-5'-P-CCNC: 22 U/L (ref 0–40)
BASOPHILS # BLD AUTO: 0 THOU/UL (ref 0–0.2)
BASOPHILS NFR BLD AUTO: 1 % (ref 0–2)
BILIRUB DIRECT SERPL-MCNC: <0.1 MG/DL
BILIRUB SERPL-MCNC: 0.2 MG/DL (ref 0–1)
BUN SERPL-MCNC: 19 MG/DL (ref 8–22)
CALCIUM SERPL-MCNC: 9.8 MG/DL (ref 8.5–10.5)
CHLORIDE BLD-SCNC: 109 MMOL/L (ref 98–107)
CHOLEST SERPL-MCNC: 188 MG/DL
CO2 SERPL-SCNC: 29 MMOL/L (ref 22–31)
CREAT SERPL-MCNC: 0.52 MG/DL (ref 0.6–1.1)
EOSINOPHIL # BLD AUTO: 0.2 THOU/UL (ref 0–0.4)
EOSINOPHIL NFR BLD AUTO: 3 % (ref 0–6)
ERYTHROCYTE [DISTWIDTH] IN BLOOD BY AUTOMATED COUNT: 13.6 % (ref 11–14.5)
FASTING STATUS PATIENT QL REPORTED: ABNORMAL
GFR SERPL CREATININE-BSD FRML MDRD: >60 ML/MIN/1.73M2
GLUCOSE BLD-MCNC: 98 MG/DL (ref 70–125)
HCT VFR BLD AUTO: 43.4 % (ref 35–47)
HDLC SERPL-MCNC: 39 MG/DL
HGB BLD-MCNC: 13.8 G/DL (ref 12–16)
LDLC SERPL CALC-MCNC: 132 MG/DL
LYMPHOCYTES # BLD AUTO: 1.7 THOU/UL (ref 0.8–4.4)
LYMPHOCYTES NFR BLD AUTO: 30 % (ref 20–40)
MCH RBC QN AUTO: 30.9 PG (ref 27–34)
MCHC RBC AUTO-ENTMCNC: 31.8 G/DL (ref 32–36)
MCV RBC AUTO: 97 FL (ref 80–100)
MONOCYTES # BLD AUTO: 0.5 THOU/UL (ref 0–0.9)
MONOCYTES NFR BLD AUTO: 9 % (ref 2–10)
NEUTROPHILS # BLD AUTO: 3.2 THOU/UL (ref 2–7.7)
NEUTROPHILS NFR BLD AUTO: 58 % (ref 50–70)
PHOSPHATE SERPL-MCNC: 3.8 MG/DL (ref 2.5–4.5)
PLATELET # BLD AUTO: 240 THOU/UL (ref 140–440)
PMV BLD AUTO: 11.6 FL (ref 8.5–12.5)
POTASSIUM BLD-SCNC: 4 MMOL/L (ref 3.5–5)
PROT SERPL-MCNC: 7.2 G/DL (ref 6–8)
RBC # BLD AUTO: 4.46 MILL/UL (ref 3.8–5.4)
SODIUM SERPL-SCNC: 141 MMOL/L (ref 136–145)
TRIGL SERPL-MCNC: 86 MG/DL
WBC: 5.5 THOU/UL (ref 4–11)

## 2020-02-18 ENCOUNTER — RECORDS - HEALTHEAST (OUTPATIENT)
Dept: LAB | Facility: HOSPITAL | Age: 55
End: 2020-02-18

## 2020-02-19 LAB — LAMOTRIGINE SERPL-MCNC: 8.7 UG/ML (ref 2.5–15)

## 2020-04-29 ENCOUNTER — RECORDS - HEALTHEAST (OUTPATIENT)
Dept: LAB | Facility: HOSPITAL | Age: 55
End: 2020-04-29

## 2020-04-29 LAB
FERRITIN SERPL-MCNC: 48 NG/ML (ref 10–130)
IRON SATN MFR SERPL: 27 % (ref 20–50)
IRON SERPL-MCNC: 82 UG/DL (ref 42–175)
TIBC SERPL-MCNC: 304 UG/DL (ref 313–563)
TRANSFERRIN SERPL-MCNC: 243 MG/DL (ref 212–360)

## 2020-05-06 ENCOUNTER — HOSPITAL ENCOUNTER (OUTPATIENT)
Dept: INTERVENTIONAL RADIOLOGY/VASCULAR | Facility: HOSPITAL | Age: 55
Discharge: HOME | End: 2020-05-06
Admitting: RADIOLOGY

## 2020-05-06 DIAGNOSIS — R63.30 FEEDING DIFFICULTIES: ICD-10-CM

## 2020-07-21 ENCOUNTER — AMBULATORY - HEALTHEAST (OUTPATIENT)
Dept: INTERVENTIONAL RADIOLOGY/VASCULAR | Facility: HOSPITAL | Age: 55
End: 2020-07-21

## 2020-07-21 DIAGNOSIS — Z11.59 ENCOUNTER FOR SCREENING FOR OTHER VIRAL DISEASES: ICD-10-CM

## 2020-07-31 ENCOUNTER — RECORDS - HEALTHEAST (OUTPATIENT)
Dept: ADMINISTRATIVE | Facility: OTHER | Age: 55
End: 2020-07-31

## 2020-08-03 ENCOUNTER — HOSPITAL ENCOUNTER (OUTPATIENT)
Dept: INTERVENTIONAL RADIOLOGY/VASCULAR | Facility: HOSPITAL | Age: 55
Discharge: HOME | End: 2020-08-03
Attending: RADIOLOGY | Admitting: RADIOLOGY

## 2020-08-03 DIAGNOSIS — R63.30 FEEDING DIFFICULTIES: ICD-10-CM

## 2020-08-03 DIAGNOSIS — R13.10 DYSPHAGIA, UNSPECIFIED TYPE: ICD-10-CM

## 2020-10-08 ENCOUNTER — AMBULATORY - HEALTHEAST (OUTPATIENT)
Dept: INTERVENTIONAL RADIOLOGY/VASCULAR | Facility: HOSPITAL | Age: 55
End: 2020-10-08

## 2020-10-08 DIAGNOSIS — Z11.59 ENCOUNTER FOR SCREENING FOR OTHER VIRAL DISEASES: ICD-10-CM

## 2020-10-28 ENCOUNTER — RECORDS - HEALTHEAST (OUTPATIENT)
Dept: LAB | Facility: CLINIC | Age: 55
End: 2020-10-28

## 2020-10-28 LAB
ALBUMIN SERPL-MCNC: 4.5 G/DL (ref 3.5–5)
ALP SERPL-CCNC: 262 U/L (ref 45–120)
ALT SERPL W P-5'-P-CCNC: 23 U/L (ref 0–45)
ANION GAP SERPL CALCULATED.3IONS-SCNC: 13 MMOL/L (ref 5–18)
AST SERPL W P-5'-P-CCNC: 46 U/L (ref 0–40)
BILIRUB SERPL-MCNC: 0.3 MG/DL (ref 0–1)
BUN SERPL-MCNC: 17 MG/DL (ref 8–22)
CALCIUM SERPL-MCNC: 10 MG/DL (ref 8.5–10.5)
CHLORIDE BLD-SCNC: 108 MMOL/L (ref 98–107)
CO2 SERPL-SCNC: 21 MMOL/L (ref 22–31)
CREAT SERPL-MCNC: 0.54 MG/DL (ref 0.6–1.1)
GFR SERPL CREATININE-BSD FRML MDRD: >60 ML/MIN/1.73M2
GLUCOSE BLD-MCNC: 107 MG/DL (ref 70–125)
POTASSIUM BLD-SCNC: 3.7 MMOL/L (ref 3.5–5)
PROT SERPL-MCNC: 7.9 G/DL (ref 6–8)
SODIUM SERPL-SCNC: 142 MMOL/L (ref 136–145)
TSH SERPL DL<=0.005 MIU/L-ACNC: 1.3 UIU/ML (ref 0.3–5)

## 2020-11-06 ENCOUNTER — AMBULATORY - HEALTHEAST (OUTPATIENT)
Dept: LAB | Facility: CLINIC | Age: 55
End: 2020-11-06

## 2020-11-06 DIAGNOSIS — Z11.59 ENCOUNTER FOR SCREENING FOR OTHER VIRAL DISEASES: ICD-10-CM

## 2020-11-08 ENCOUNTER — COMMUNICATION - HEALTHEAST (OUTPATIENT)
Dept: SCHEDULING | Facility: CLINIC | Age: 55
End: 2020-11-08

## 2020-11-10 ENCOUNTER — HOSPITAL ENCOUNTER (OUTPATIENT)
Dept: INTERVENTIONAL RADIOLOGY/VASCULAR | Facility: HOSPITAL | Age: 55
Discharge: HOME | End: 2020-11-10
Admitting: RADIOLOGY

## 2020-11-10 DIAGNOSIS — R63.30 FEEDING DIFFICULTIES: ICD-10-CM

## 2020-11-10 DIAGNOSIS — R13.10 DYSPHAGIA, UNSPECIFIED TYPE: ICD-10-CM

## 2021-01-20 ENCOUNTER — RECORDS - HEALTHEAST (OUTPATIENT)
Dept: ADMINISTRATIVE | Facility: OTHER | Age: 56
End: 2021-01-20

## 2021-01-22 ENCOUNTER — AMBULATORY - HEALTHEAST (OUTPATIENT)
Dept: INTERVENTIONAL RADIOLOGY/VASCULAR | Facility: HOSPITAL | Age: 56
End: 2021-01-22

## 2021-01-22 DIAGNOSIS — Z11.59 ENCOUNTER FOR SCREENING FOR OTHER VIRAL DISEASES: ICD-10-CM

## 2021-02-08 ENCOUNTER — RECORDS - HEALTHEAST (OUTPATIENT)
Dept: LAB | Facility: HOSPITAL | Age: 56
End: 2021-02-08

## 2021-02-08 ENCOUNTER — AMBULATORY - HEALTHEAST (OUTPATIENT)
Dept: LAB | Facility: CLINIC | Age: 56
End: 2021-02-08

## 2021-02-08 DIAGNOSIS — Z11.59 ENCOUNTER FOR SCREENING FOR OTHER VIRAL DISEASES: ICD-10-CM

## 2021-02-08 LAB
ALBUMIN SERPL-MCNC: 4 G/DL (ref 3.5–5)
ALBUMIN SERPL-MCNC: 4 G/DL (ref 3.5–5)
ALP SERPL-CCNC: 241 U/L (ref 45–120)
ALT SERPL W P-5'-P-CCNC: 28 U/L (ref 0–45)
AMMONIA PLAS-SCNC: 61 UMOL/L (ref 11–35)
ANION GAP SERPL CALCULATED.3IONS-SCNC: 10 MMOL/L (ref 5–18)
AST SERPL W P-5'-P-CCNC: 41 U/L (ref 0–40)
BILIRUB DIRECT SERPL-MCNC: <0.1 MG/DL
BILIRUB SERPL-MCNC: 0.3 MG/DL (ref 0–1)
BUN SERPL-MCNC: 17 MG/DL (ref 8–22)
CALCIUM SERPL-MCNC: 10.1 MG/DL (ref 8.5–10.5)
CHLORIDE BLD-SCNC: 106 MMOL/L (ref 98–107)
CHOLEST SERPL-MCNC: 214 MG/DL
CO2 SERPL-SCNC: 25 MMOL/L (ref 22–31)
CREAT SERPL-MCNC: 0.54 MG/DL (ref 0.6–1.1)
ERYTHROCYTE [DISTWIDTH] IN BLOOD BY AUTOMATED COUNT: 13.3 % (ref 11–14.5)
FASTING STATUS PATIENT QL REPORTED: ABNORMAL
GFR SERPL CREATININE-BSD FRML MDRD: >60 ML/MIN/1.73M2
GLUCOSE BLD-MCNC: 104 MG/DL (ref 70–125)
HCT VFR BLD AUTO: 47.7 % (ref 35–47)
HDLC SERPL-MCNC: 45 MG/DL
HGB BLD-MCNC: 15.4 G/DL (ref 12–16)
LDLC SERPL CALC-MCNC: 151 MG/DL
MCH RBC QN AUTO: 31.5 PG (ref 27–34)
MCHC RBC AUTO-ENTMCNC: 32.3 G/DL (ref 32–36)
MCV RBC AUTO: 98 FL (ref 80–100)
PHOSPHATE SERPL-MCNC: 3.2 MG/DL (ref 2.5–4.5)
PLATELET # BLD AUTO: 272 THOU/UL (ref 140–440)
PMV BLD AUTO: 11.1 FL (ref 8.5–12.5)
POTASSIUM BLD-SCNC: 4.2 MMOL/L (ref 3.5–5)
PROT SERPL-MCNC: 8 G/DL (ref 6–8)
RBC # BLD AUTO: 4.89 MILL/UL (ref 3.8–5.4)
SODIUM SERPL-SCNC: 141 MMOL/L (ref 136–145)
TRIGL SERPL-MCNC: 92 MG/DL
WBC: 5.6 THOU/UL (ref 4–11)

## 2021-02-09 ENCOUNTER — COMMUNICATION - HEALTHEAST (OUTPATIENT)
Dept: SCHEDULING | Facility: CLINIC | Age: 56
End: 2021-02-09

## 2021-02-09 LAB
SARS-COV-2 PCR COMMENT: NORMAL
SARS-COV-2 RNA SPEC QL NAA+PROBE: NEGATIVE
SARS-COV-2 VIRUS SPECIMEN SOURCE: NORMAL

## 2021-02-11 ENCOUNTER — AMBULATORY - HEALTHEAST (OUTPATIENT)
Dept: INTERVENTIONAL RADIOLOGY/VASCULAR | Facility: HOSPITAL | Age: 56
End: 2021-02-11

## 2021-02-11 ENCOUNTER — RECORDS - HEALTHEAST (OUTPATIENT)
Dept: ADMINISTRATIVE | Facility: OTHER | Age: 56
End: 2021-02-11
Payer: MEDICARE

## 2021-02-11 ENCOUNTER — HOSPITAL ENCOUNTER (OUTPATIENT)
Dept: INTERVENTIONAL RADIOLOGY/VASCULAR | Facility: HOSPITAL | Age: 56
Discharge: HOME | End: 2021-02-11
Attending: NURSE PRACTITIONER | Admitting: RADIOLOGY

## 2021-02-11 DIAGNOSIS — E46 MALNUTRITION, UNSPECIFIED TYPE (H): ICD-10-CM

## 2021-02-11 DIAGNOSIS — R63.30 FEEDING DIFFICULTIES: ICD-10-CM

## 2021-02-24 ENCOUNTER — RECORDS - HEALTHEAST (OUTPATIENT)
Dept: LAB | Facility: HOSPITAL | Age: 56
End: 2021-02-24

## 2021-02-25 LAB — LAMOTRIGINE SERPL-MCNC: 9.9 UG/ML (ref 2.5–15)

## 2021-03-17 DIAGNOSIS — G40.319 IDIOPATHIC GENERALIZED EPILEPSY, INTRACTABLE (H): Primary | ICD-10-CM

## 2021-03-17 PROBLEM — M41.9 SCOLIOSIS: Status: ACTIVE | Noted: 2021-03-17

## 2021-03-17 RX ORDER — LAMOTRIGINE 200 MG/1
TABLET ORAL
Qty: 62 TABLET | Refills: 0 | Status: SHIPPED | OUTPATIENT
Start: 2021-03-17 | End: 2021-04-07

## 2021-03-17 RX ORDER — TOPIRAMATE 200 MG/1
TABLET, FILM COATED ORAL
Qty: 62 TABLET | Refills: 0 | Status: SHIPPED | OUTPATIENT
Start: 2021-03-17 | End: 2021-04-07

## 2021-03-17 RX ORDER — LEVETIRACETAM 100 MG/ML
SOLUTION ORAL
Qty: 620 ML | Refills: 0 | Status: SHIPPED | OUTPATIENT
Start: 2021-03-17 | End: 2021-04-07

## 2021-03-17 NOTE — LETTER
3/17/2021        RE: Elysia Arellano  [address] [address]            Dear Elysia,      We recently provided you with medication refills.  Many medications require routine follow-up with your doctor.    Your prescription(s) have been refilled for 30 days so you may have time for the above noted follow-up. Please call to schedule soon so we can assure you have an appointment before your next refills are needed. If you have already made a follow up appointment, please disregard this letter.           Sincerely,        Lex Forman MD  [facility]     (Formerly known as [facility])

## 2021-03-17 NOTE — TELEPHONE ENCOUNTER
Refill request for Keppra, Topamax and Lamictal  Letter mailed to guardian to schedule follow up  Medication T'd for review and signature  Agatha Lawler CMA on 3/17/2021 at 1:10 PM

## 2021-04-07 DIAGNOSIS — G40.319 IDIOPATHIC GENERALIZED EPILEPSY, INTRACTABLE (H): ICD-10-CM

## 2021-04-07 RX ORDER — LEVETIRACETAM 100 MG/ML
SOLUTION ORAL
Qty: 600 ML | Refills: 11 | Status: SHIPPED | OUTPATIENT
Start: 2021-04-07 | End: 2021-04-15

## 2021-04-07 RX ORDER — LAMOTRIGINE 200 MG/1
TABLET ORAL
Qty: 62 TABLET | Refills: 11 | Status: SHIPPED | OUTPATIENT
Start: 2021-04-07 | End: 2021-04-15

## 2021-04-07 RX ORDER — TOPIRAMATE 200 MG/1
TABLET, FILM COATED ORAL
Qty: 62 TABLET | Refills: 11 | Status: SHIPPED | OUTPATIENT
Start: 2021-04-07 | End: 2021-04-15

## 2021-04-07 NOTE — TELEPHONE ENCOUNTER
Refill request for Topamax, Keppra and Lamictal  Medication T'd for review and signature  Appt scheduled for 4/15/21  Agatha Lawler CMA on 4/7/2021 at 3:00 PM

## 2021-04-14 SDOH — HEALTH STABILITY: MENTAL HEALTH: HOW OFTEN DO YOU HAVE 6 OR MORE DRINKS ON ONE OCCASION?: NEVER

## 2021-04-14 SDOH — HEALTH STABILITY: MENTAL HEALTH: HOW OFTEN DO YOU HAVE A DRINK CONTAINING ALCOHOL?: NEVER

## 2021-04-14 SDOH — HEALTH STABILITY: MENTAL HEALTH: HOW MANY STANDARD DRINKS CONTAINING ALCOHOL DO YOU HAVE ON A TYPICAL DAY?: NOT ASKED

## 2021-04-15 ENCOUNTER — OFFICE VISIT (OUTPATIENT)
Dept: NEUROLOGY | Facility: CLINIC | Age: 56
End: 2021-04-15
Payer: MEDICARE

## 2021-04-15 VITALS
SYSTOLIC BLOOD PRESSURE: 119 MMHG | DIASTOLIC BLOOD PRESSURE: 75 MMHG | HEART RATE: 90 BPM | BODY MASS INDEX: 25.6 KG/M2 | WEIGHT: 127 LBS | HEIGHT: 59 IN

## 2021-04-15 DIAGNOSIS — G40.319 IDIOPATHIC GENERALIZED EPILEPSY, INTRACTABLE (H): Primary | ICD-10-CM

## 2021-04-15 PROBLEM — U07.1 PNEUMONIA DUE TO 2019 NOVEL CORONAVIRUS: Status: ACTIVE | Noted: 2020-08-07

## 2021-04-15 PROBLEM — J12.82 PNEUMONIA DUE TO 2019 NOVEL CORONAVIRUS: Status: ACTIVE | Noted: 2020-08-07

## 2021-04-15 PROBLEM — U07.1 PNEUMONIA DUE TO 2019 NOVEL CORONAVIRUS: Status: RESOLVED | Noted: 2020-08-07 | Resolved: 2021-04-15

## 2021-04-15 PROBLEM — Z98.890 HISTORY OF NISSEN FUNDOPLICATION: Status: ACTIVE | Noted: 2019-07-04

## 2021-04-15 PROBLEM — J12.82 PNEUMONIA DUE TO 2019 NOVEL CORONAVIRUS: Status: RESOLVED | Noted: 2020-08-07 | Resolved: 2021-04-15

## 2021-04-15 PROCEDURE — 99214 OFFICE O/P EST MOD 30 MIN: CPT | Performed by: PSYCHIATRY & NEUROLOGY

## 2021-04-15 RX ORDER — LAMOTRIGINE 25 MG/1
75 TABLET ORAL 2 TIMES DAILY
Qty: 180 TABLET | Refills: 11 | Status: SHIPPED | OUTPATIENT
Start: 2021-04-15 | End: 2022-04-27

## 2021-04-15 RX ORDER — TOPIRAMATE 200 MG/1
TABLET, FILM COATED ORAL
Qty: 62 TABLET | Refills: 11 | Status: SHIPPED | OUTPATIENT
Start: 2021-04-15 | End: 2022-04-13

## 2021-04-15 RX ORDER — LEVETIRACETAM 100 MG/ML
SOLUTION ORAL
Qty: 600 ML | Refills: 11 | Status: SHIPPED | OUTPATIENT
Start: 2021-04-15 | End: 2022-04-13

## 2021-04-15 RX ORDER — LAMOTRIGINE 200 MG/1
TABLET ORAL
Qty: 62 TABLET | Refills: 11 | Status: SHIPPED | OUTPATIENT
Start: 2021-04-15 | End: 2022-04-27

## 2021-04-15 ASSESSMENT — MIFFLIN-ST. JEOR: SCORE: 1086.7

## 2021-04-15 NOTE — NURSING NOTE
Chief Complaint   Patient presents with     Seizures     Annual follow up     Agatha Lawler CMA on 4/15/2021 at 9:34 AM

## 2021-04-15 NOTE — LETTER
4/15/2021         RE: Elysia Arellano  [address] [place] [address]        Dear Colleague,    Thank you for referring your patient, Elysia Arellano, to the [facility] [place]. Please see a copy of my visit note below.    NEUROLOGY FOLLOW UP VISIT  NOTE       [facility] [place]  [address] [place] [address]  Tel: [phone]  Fax: [phone]  [email or web address]     Elysia Arellano,  1965, MRN [id number]  PCP: Eduardo Clement, [phone]  Date: 04/15/2021      ASSESSMENT & PLAN     Diagnosis code  1. Idiopathic generalized epilepsy, intractable (H)     Idiopathic generalized epilepsy, intractable  55-year-old female with mental retardation, cerebral palsy and intractable epilepsy who is a resident of a group home and returns for follow-up.  She was admitted to the hospital last year due to COVID-19 infection but fortunately had benign course and also did not have any breakthrough seizure.  I have refilled her prescription for Keppra, lamotrigine and Topamax.  I am checking anticonvulsant level and comprehensive metabolic panel and CBC.  Follow-up will be in 1 year    Thank you again for this referral, please feel free to contact me if you have any questions.    Lex Forman MD  [facility], [place]  (Formerly, [facility]     HISTORY OF PRESENT ILLNESS     Patient is a 55-year-old female with a mental retardation, cerebral palsy, intractable epilepsy was a resident of a group home returns for follow-up for her seizure disorder.  She was last seen on 2020 when she had admission to [facility] due to breakthrough seizure that was felt to be due to UTI lowering the seizure threshold.  Otherwise her seizures have remained well controlled on Keppra, Topamax and lamotrigine.  She also uses Valium through G-tube if needed.  Since her last visit caregiver reports she  was admitted to [facility] due to Covid infection and was inpatient for about a week.  After she was discharged she has remained stable.  Caregiver does not recall her having breakthrough seizure during her hospitalization.  Overall her mental status has not changed she is minimally responsive occasionally perking up.     PROBLEM LIST   Patient Active Problem List   Diagnosis Code     Scoliosis M41.9     Intellectual disability F79     Cerebral palsy (H) G80.9     Idiopathic generalized epilepsy, intractable (H) G40.319     Gastroesophageal reflux disease without esophagitis K21.9     History of Nissen fundoplication Z98.890     Osteoporosis M81.0         PAST MEDICAL & SURGICAL HISTORY     Past Medical History:   Patient  has a past medical history of Pneumonia due to 2019 novel coronavirus (8/7/2020).    Surgical History:  She  has a past surgical history that includes IR Gastro Jejunostomy Tube Placement and Scoliosis S/P Lewis Valentín Placement.     SOCIAL HISTORY     Reviewed, and she  reports that she has never smoked. She has never used smokeless tobacco. She reports that she does not drink alcohol.     FAMILY HISTORY     Reviewed, and family history includes Seizure Disorder in her sister.     ALLERGIES     Allergies   Allergen Reactions     Ciprofloxacin Rash     Other reaction(s): *Unknown     Buffered Aspirin      Other reaction(s): Unknown     Lanolin      Other reaction(s): *Unknown     Nsaids      Other reaction(s): Unknown     Augmentin Rash     Ibuprofen Rash     Other reaction(s): *Unknown     Salicylates Rash     Other reaction(s): Unknown         REVIEW OF SYSTEMS     A 12 point review of system was performed and was negative except as outlined in the history of present illness.     HOME MEDICATIONS     Current Outpatient Rx   Medication Sig Dispense Refill     acetaminophen (TYLENOL) 325 MG tablet Take 650 mg by mouth every 4 hours as needed for mild pain As needed for pain,  discomfort, and fever.  Not to exceed 3000 mg in 24 hours.       arginine (ARGINAID) PACK Take 1 packet by mouth 2 times daily Per G-tube mixed with 4 ounces of water       bisacodyl (DULCOLAX) 10 MG suppository Place 10 mg rectally every other day       calcium carbonate 1250 MG/5ML SUSP suspension Take 1,500 mg by mouth daily Per G-tube        carboxymethylcellulose PF (REFRESH PLUS) 0.5 % SOLN ophthalmic solution 1 drop At Bedtime       desonide (DESOWEN) 0.05 % external cream Apply topically 2 times daily .  Hold if no red, itchy, scaly areas on face.       Dextromethorphan-guaiFENesin  MG/5ML syrup Take 10 mLs by mouth every 6 hours as needed for cough       diazepam (VALIUM) 5 MG/ML (HIGH CONC) solution Take 5 mg by mouth every 8 hours as needed for anxiety       diphenhydrAMINE (BENADRYL) 12.5 MG/5ML solution Take 25 mg by mouth every 4 hours as needed for sleep        folic acid (FOLVITE) 1 MG tablet Take 1 mg by mouth daily Per G- tube       hydrocortisone (CORTAID) 1 % external cream Apply topically 2 times daily as needed To affected area of abdomen       lamoTRIgine (LAMICTAL) 200 MG tablet TAKE 1 TABLET PER G-TUBE TWICE DAILY *1 TOTAL FILL* 62 tablet 11     lamoTRIgine (LAMICTAL) 25 MG tablet Take 3 tablets (75 mg) by mouth 2 times daily Per G-tube 180 tablet 11     levETIRAcetam (KEPPRA) 100 MG/ML solution GIVE 10ML (1000MG) PER G-TUBE TWICE DAILY *1 TOTAL FILL* 600 mL 11     levothyroxine (SYNTHROID/LEVOTHROID) 50 MCG tablet Take 50 mcg by mouth daily Per G-tube       multivitamin w/minerals (THERA-VIT-M) tablet Take 1 tablet by mouth daily Per G-tube       nitroFURantoin (FURADANTIN) 25 MG/5ML suspension Take 100 mg by mouth 4 times daily Per G-tube       nystatin (MYCOSTATIN) 532653 UNIT/GM external cream Apply topically 3 times daily as needed for dry skin To groin, and two times daily to G-tube site       polyethylene glycol (MIRALAX/GLYCOLAX) packet Take 1 packet by mouth 2 times daily as  "needed for constipation Per G-tube, mix with 150 ml of water       protein modular, BENEPROTEIN, PACK 7 g by Per Feeding Tube route 2 times daily       Psyllium (GENFIBER PO) Take 2 Tablespoonful by mouth daily Per G-tube with 4 ounces of water flush       topiramate (TOPAMAX) 200 MG tablet TAKE 1 TABLET PER G-TUBE TWICE DAILY 62 tablet 11     Vitamin D, Cholecalciferol, 1000 units TABS Take 2,000 Units by mouth daily Per G-tube       clonazePAM (KLONOPIN) 0.5 MG tablet Take 0.25 mg by mouth At Bedtime Per G-tube       ranitidine (ZANTAC) 150 MG/10ML syrup Take 150 mg by mouth 2 times daily Per G-tube       rectal diazepam KIT 1 kit once as needed for medication administration 10 mg rectally once for seizure lasting over 5 minutes.       Sodium Phosphates (FLEET ENEMA RE) Place rectally every other day If no results from suppository           PHYSICAL EXAM     Vital signs  /75 (BP Location: Left arm, Patient Position: Sitting)   Pulse 90   Ht 1.499 m (4' 11\")   Wt 57.6 kg (127 lb)   BMI 25.65 kg/m      Weight:   127 lbs 0 oz    General physical exam reveals a middle-aged female who is sitting in a chair alert and oriented ×0 noncommunicative. She occasionally tracks. Vital signs were reviewed and are documented in electronic medical record. She withdraws all 4 extremities to painful stimuli reflexes 1+ she has increased tone. She is wheelchair-bound.     DIAGNOSTIC STUDIES     PERTINENT RADIOLOGY  Following imaging studies were reviewed:     CT HEAD 7/1/19  1. No acute intracranial abnormality. 2. Unchanged mild cerebral volume loss and severe cerebellar volume loss.    EEG 11/20/2019  This is an abnormal awake and drowsy EEG due to background   slowing, most suggestive of severe compromised of brain function, due to   encephalopathy or organic logan syndrome of non-specific etiology. No   seizure activities detected during study. Clinical correlation is   recommended.      PERTINENT LABS  Following labs " were reviewed:    AGAP: 7 mmol/L (10/08/19) Albumin Level: 3.8 gm/dL (10/08/19) Alkaline Phosphatase: 264 U/L (10/08/19)   ALT/SGPT: 13 U/L (10/08/19) AST/SGOT: 31 U/L (10/08/19) Bilirubin Total: 0.2 mg/dL (10/08/19)   BUN: 19 mg/dL (10/08/19) Calcium Level: 9.9 mg/dL (10/08/19) Chloride Level: 107 mmol/L (10/08/19)   CO2 Level: 29 mmol/L (10/08/19) Creatinine Level: 0.55 mg/dL (10/08/19) eGFR: >60 mL/min/1.73m2 (10/08/19)   eGFR African American: >60 mL/min/1.73m2 (10/08/19) Glucose Level: 92 mg/dL (10/08/19) Hct: 44.0 % (10/08/19)   Hemogram Comments: Hemogram Comments (10/08/19) Hgb: 14.1 gm/dL (10/08/19) Lamotrigine (Lamictal) Level: Lamotrigine (Lamictal) Level (10/08/19)   Levetiracetam (Keppra) Level: Levetiracetam (Keppra) Level (10/08/19) MCH: 31.6 pg (10/08/19) MCHC: 32.0 gm/dL (10/08/19)   MCV: 99 fL (10/08/19) MPV: 11.4 fL (10/08/19) Platelet: 261 x10^3/uL (10/08/19)   Potassium Level: 3.8 mmol/L (10/08/19) Protein Total: 7.4 gm/dL (10/08/19) RBC: 4.46 x10^6/uL (10/08/19)   RDW: 13.0 % (10/08/19) Sodium Level: 143 mmol/L (10/08/19) Topiramate Level: Topiramate Level (10/08/19)   WBC: 5.0 x10^3/uL (10/08/19)                   Total time spent for face to face visit, reviewing labs/imaging studies, counseling and coordination of care was: 30 Minutes spent on the date of the encounter doing chart review, review of outside records, review of test results, interpretation of tests, patient visit, documentation and discussion with other provider(s)       This note was dictated using voice recognition software.  Any grammatical or context distortions are unintentional and inherent to the software.             Again, thank you for allowing me to participate in the care of your patient.        Sincerely,        Lex Forman MD

## 2021-04-15 NOTE — PROGRESS NOTES
NEUROLOGY FOLLOW UP VISIT  NOTE       [facility] NEUROLOGY [place]  [address] [place] [address]  Tel: [phone]  Fax: [phone]  [email or web address]     Elysia Arellano,  1965, MRN [id number]  PCP: Eduardo Clement, [phone]  Date: 04/15/2021      ASSESSMENT & PLAN     Diagnosis code  1. Idiopathic generalized epilepsy, intractable (H)     Idiopathic generalized epilepsy, intractable  55-year-old female with mental retardation, cerebral palsy and intractable epilepsy who is a resident of a group home and returns for follow-up.  She was admitted to the hospital last year due to COVID-19 infection but fortunately had benign course and also did not have any breakthrough seizure.  I have refilled her prescription for Keppra, lamotrigine and Topamax.  I am checking anticonvulsant level and comprehensive metabolic panel and CBC.  Follow-up will be in 1 year    Thank you again for this referral, please feel free to contact me if you have any questions.    Lex Forman MD  [facility]  (Formerly, [facility]     HISTORY OF PRESENT ILLNESS     Patient is a 55-year-old female with a mental retardation, cerebral palsy, intractable epilepsy was a resident of a group home returns for follow-up for her seizure disorder.  She was last seen on 2020 when she had admission to [facility] due to breakthrough seizure that was felt to be due to UTI lowering the seizure threshold.  Otherwise her seizures have remained well controlled on Keppra, Topamax and lamotrigine.  She also uses Valium through G-tube if needed.  Since her last visit caregiver reports she was admitted to [facility] due to Covid infection and was inpatient for about a week.  After she was discharged she has remained stable.  Caregiver does not recall her having breakthrough seizure during her hospitalization.  Overall her mental status has  not changed she is minimally responsive occasionally perking up.     PROBLEM LIST   Patient Active Problem List   Diagnosis Code     Scoliosis M41.9     Intellectual disability F79     Cerebral palsy (H) G80.9     Idiopathic generalized epilepsy, intractable (H) G40.319     Gastroesophageal reflux disease without esophagitis K21.9     History of Nissen fundoplication Z98.890     Osteoporosis M81.0         PAST MEDICAL & SURGICAL HISTORY     Past Medical History:   Patient  has a past medical history of Pneumonia due to 2019 novel coronavirus (8/7/2020).    Surgical History:  She  has a past surgical history that includes IR Gastro Jejunostomy Tube Placement and Scoliosis S/P Lewis Valentín Placement.     SOCIAL HISTORY     Reviewed, and she  reports that she has never smoked. She has never used smokeless tobacco. She reports that she does not drink alcohol.     FAMILY HISTORY     Reviewed, and family history includes Seizure Disorder in her sister.     ALLERGIES     Allergies   Allergen Reactions     Ciprofloxacin Rash     Other reaction(s): *Unknown     Buffered Aspirin      Other reaction(s): Unknown     Lanolin      Other reaction(s): *Unknown     Nsaids      Other reaction(s): Unknown     Augmentin Rash     Ibuprofen Rash     Other reaction(s): *Unknown     Salicylates Rash     Other reaction(s): Unknown         REVIEW OF SYSTEMS     A 12 point review of system was performed and was negative except as outlined in the history of present illness.     HOME MEDICATIONS     Current Outpatient Rx   Medication Sig Dispense Refill     acetaminophen (TYLENOL) 325 MG tablet Take 650 mg by mouth every 4 hours as needed for mild pain As needed for pain, discomfort, and fever.  Not to exceed 3000 mg in 24 hours.       arginine (ARGINAID) PACK Take 1 packet by mouth 2 times daily Per G-tube mixed with 4 ounces of water       bisacodyl (DULCOLAX) 10 MG suppository Place 10 mg rectally every other day       calcium carbonate  1250 MG/5ML SUSP suspension Take 1,500 mg by mouth daily Per G-tube        carboxymethylcellulose PF (REFRESH PLUS) 0.5 % SOLN ophthalmic solution 1 drop At Bedtime       desonide (DESOWEN) 0.05 % external cream Apply topically 2 times daily .  Hold if no red, itchy, scaly areas on face.       Dextromethorphan-guaiFENesin  MG/5ML syrup Take 10 mLs by mouth every 6 hours as needed for cough       diazepam (VALIUM) 5 MG/ML (HIGH CONC) solution Take 5 mg by mouth every 8 hours as needed for anxiety       diphenhydrAMINE (BENADRYL) 12.5 MG/5ML solution Take 25 mg by mouth every 4 hours as needed for sleep        folic acid (FOLVITE) 1 MG tablet Take 1 mg by mouth daily Per G- tube       hydrocortisone (CORTAID) 1 % external cream Apply topically 2 times daily as needed To affected area of abdomen       lamoTRIgine (LAMICTAL) 200 MG tablet TAKE 1 TABLET PER G-TUBE TWICE DAILY *1 TOTAL FILL* 62 tablet 11     lamoTRIgine (LAMICTAL) 25 MG tablet Take 3 tablets (75 mg) by mouth 2 times daily Per G-tube 180 tablet 11     levETIRAcetam (KEPPRA) 100 MG/ML solution GIVE 10ML (1000MG) PER G-TUBE TWICE DAILY *1 TOTAL FILL* 600 mL 11     levothyroxine (SYNTHROID/LEVOTHROID) 50 MCG tablet Take 50 mcg by mouth daily Per G-tube       multivitamin w/minerals (THERA-VIT-M) tablet Take 1 tablet by mouth daily Per G-tube       nitroFURantoin (FURADANTIN) 25 MG/5ML suspension Take 100 mg by mouth 4 times daily Per G-tube       nystatin (MYCOSTATIN) 830269 UNIT/GM external cream Apply topically 3 times daily as needed for dry skin To groin, and two times daily to G-tube site       polyethylene glycol (MIRALAX/GLYCOLAX) packet Take 1 packet by mouth 2 times daily as needed for constipation Per G-tube, mix with 150 ml of water       protein modular, BENEPROTEIN, PACK 7 g by Per Feeding Tube route 2 times daily       Psyllium (GENFIBER PO) Take 2 Tablespoonful by mouth daily Per G-tube with 4 ounces of water flush       topiramate  "(TOPAMAX) 200 MG tablet TAKE 1 TABLET PER G-TUBE TWICE DAILY 62 tablet 11     Vitamin D, Cholecalciferol, 1000 units TABS Take 2,000 Units by mouth daily Per G-tube       clonazePAM (KLONOPIN) 0.5 MG tablet Take 0.25 mg by mouth At Bedtime Per G-tube       ranitidine (ZANTAC) 150 MG/10ML syrup Take 150 mg by mouth 2 times daily Per G-tube       rectal diazepam KIT 1 kit once as needed for medication administration 10 mg rectally once for seizure lasting over 5 minutes.       Sodium Phosphates (FLEET ENEMA RE) Place rectally every other day If no results from suppository           PHYSICAL EXAM     Vital signs  /75 (BP Location: Left arm, Patient Position: Sitting)   Pulse 90   Ht 1.499 m (4' 11\")   Wt 57.6 kg (127 lb)   BMI 25.65 kg/m      Weight:   127 lbs 0 oz    General physical exam reveals a middle-aged female who is sitting in a chair alert and oriented ×0 noncommunicative. She occasionally tracks. Vital signs were reviewed and are documented in electronic medical record. She withdraws all 4 extremities to painful stimuli reflexes 1+ she has increased tone. She is wheelchair-bound.     DIAGNOSTIC STUDIES     PERTINENT RADIOLOGY  Following imaging studies were reviewed:     CT HEAD 7/1/19  1. No acute intracranial abnormality. 2. Unchanged mild cerebral volume loss and severe cerebellar volume loss.    EEG 11/20/2019  This is an abnormal awake and drowsy EEG due to background   slowing, most suggestive of severe compromised of brain function, due to   encephalopathy or organic logan syndrome of non-specific etiology. No   seizure activities detected during study. Clinical correlation is   recommended.      PERTINENT LABS  Following labs were reviewed:    AGAP: 7 mmol/L (10/08/19) Albumin Level: 3.8 gm/dL (10/08/19) Alkaline Phosphatase: 264 U/L (10/08/19)   ALT/SGPT: 13 U/L (10/08/19) AST/SGOT: 31 U/L (10/08/19) Bilirubin Total: 0.2 mg/dL (10/08/19)   BUN: 19 mg/dL (10/08/19) Calcium Level: 9.9 mg/dL " (10/08/19) Chloride Level: 107 mmol/L (10/08/19)   CO2 Level: 29 mmol/L (10/08/19) Creatinine Level: 0.55 mg/dL (10/08/19) eGFR: >60 mL/min/1.73m2 (10/08/19)   eGFR African American: >60 mL/min/1.73m2 (10/08/19) Glucose Level: 92 mg/dL (10/08/19) Hct: 44.0 % (10/08/19)   Hemogram Comments: Hemogram Comments (10/08/19) Hgb: 14.1 gm/dL (10/08/19) Lamotrigine (Lamictal) Level: Lamotrigine (Lamictal) Level (10/08/19)   Levetiracetam (Keppra) Level: Levetiracetam (Keppra) Level (10/08/19) MCH: 31.6 pg (10/08/19) MCHC: 32.0 gm/dL (10/08/19)   MCV: 99 fL (10/08/19) MPV: 11.4 fL (10/08/19) Platelet: 261 x10^3/uL (10/08/19)   Potassium Level: 3.8 mmol/L (10/08/19) Protein Total: 7.4 gm/dL (10/08/19) RBC: 4.46 x10^6/uL (10/08/19)   RDW: 13.0 % (10/08/19) Sodium Level: 143 mmol/L (10/08/19) Topiramate Level: Topiramate Level (10/08/19)   WBC: 5.0 x10^3/uL (10/08/19)                   Total time spent for face to face visit, reviewing labs/imaging studies, counseling and coordination of care was: 30 Minutes spent on the date of the encounter doing chart review, review of outside records, review of test results, interpretation of tests, patient visit, documentation and discussion with other provider(s)       This note was dictated using voice recognition software.  Any grammatical or context distortions are unintentional and inherent to the software.

## 2021-04-22 ENCOUNTER — RECORDS - HEALTHEAST (OUTPATIENT)
Dept: LAB | Facility: HOSPITAL | Age: 56
End: 2021-04-22

## 2021-04-22 LAB
ALBUMIN SERPL-MCNC: 3.6 G/DL (ref 3.5–5)
ALP SERPL-CCNC: 236 U/L (ref 45–120)
ALP SERPL-CCNC: 236 U/L (ref 45–120)
ALT SERPL W P-5'-P-CCNC: 21 U/L (ref 0–45)
ALT SERPL W P-5'-P-CCNC: 21 U/L (ref 0–45)
AMMONIA PLAS-SCNC: 73 UMOL/L (ref 11–35)
ANION GAP SERPL CALCULATED.3IONS-SCNC: 10 MMOL/L (ref 5–18)
ANION GAP SERPL CALCULATED.3IONS-SCNC: 10 MMOL/L (ref 5–18)
AST SERPL W P-5'-P-CCNC: 36 U/L (ref 0–40)
AST SERPL W P-5'-P-CCNC: 36 U/L (ref 0–40)
BILIRUB DIRECT SERPL-MCNC: 0.1 MG/DL
BILIRUB SERPL-MCNC: 0.3 MG/DL (ref 0–1)
BILIRUB SERPL-MCNC: 0.3 MG/DL (ref 0–1)
BUN SERPL-MCNC: 18 MG/DL (ref 8–22)
BUN SERPL-MCNC: 18 MG/DL (ref 8–22)
CALCIUM SERPL-MCNC: 9.5 MG/DL (ref 8.5–10.5)
CALCIUM SERPL-MCNC: 9.5 MG/DL (ref 8.5–10.5)
CHLORIDE BLD-SCNC: 106 MMOL/L (ref 98–107)
CHLORIDE BLD-SCNC: 106 MMOL/L (ref 98–107)
CHOLEST SERPL-MCNC: 179 MG/DL
CO2 SERPL-SCNC: 23 MMOL/L (ref 22–31)
CO2 SERPL-SCNC: 23 MMOL/L (ref 22–31)
CREAT SERPL-MCNC: 0.5 MG/DL (ref 0.6–1.1)
CREAT SERPL-MCNC: 0.5 MG/DL (ref 0.6–1.1)
ERYTHROCYTE [DISTWIDTH] IN BLOOD BY AUTOMATED COUNT: 13 % (ref 11–14.5)
FASTING STATUS PATIENT QL REPORTED: YES
GFR SERPL CREATININE-BSD FRML MDRD: >60 ML/MIN/1.73M2
GFR SERPL CREATININE-BSD FRML MDRD: >60 ML/MIN/1.73M2
GLUCOSE BLD-MCNC: 107 MG/DL (ref 70–125)
GLUCOSE BLD-MCNC: 107 MG/DL (ref 70–125)
HCT VFR BLD AUTO: 42.8 % (ref 35–47)
HDLC SERPL-MCNC: 45 MG/DL
HGB BLD-MCNC: 14 G/DL (ref 12–16)
LDLC SERPL CALC-MCNC: 123 MG/DL
LEVETIRACETAM (KEPPRA): 37.4 UG/ML (ref 6–46)
MCH RBC QN AUTO: 31.6 PG (ref 27–34)
MCHC RBC AUTO-ENTMCNC: 32.7 G/DL (ref 32–36)
MCV RBC AUTO: 97 FL (ref 80–100)
PHOSPHATE SERPL-MCNC: 3.2 MG/DL (ref 2.5–4.5)
PLATELET # BLD AUTO: 296 THOU/UL (ref 140–440)
PMV BLD AUTO: 11.5 FL (ref 8.5–12.5)
POTASSIUM BLD-SCNC: 3.9 MMOL/L (ref 3.5–5)
POTASSIUM BLD-SCNC: 3.9 MMOL/L (ref 3.5–5)
PROT SERPL-MCNC: 7.2 G/DL (ref 6–8)
PROT SERPL-MCNC: 7.2 G/DL (ref 6–8)
RBC # BLD AUTO: 4.43 MILL/UL (ref 3.8–5.4)
SODIUM SERPL-SCNC: 139 MMOL/L (ref 136–145)
SODIUM SERPL-SCNC: 139 MMOL/L (ref 136–145)
TRIGL SERPL-MCNC: 57 MG/DL
WBC: 6.3 THOU/UL (ref 4–11)

## 2021-04-24 LAB
LAMOTRIGINE SERPL-MCNC: 7.1 UG/ML (ref 2.5–15)
TOPIRAMATE SERPL-MCNC: 18.7 UG/ML (ref 5–20)

## 2021-04-27 ENCOUNTER — RECORDS - HEALTHEAST (OUTPATIENT)
Dept: LAB | Facility: HOSPITAL | Age: 56
End: 2021-04-27

## 2021-04-27 LAB
IRON SATN MFR SERPL: 14 % (ref 20–50)
IRON SERPL-MCNC: 43 UG/DL (ref 42–175)
TIBC SERPL-MCNC: 297 UG/DL (ref 313–563)
TRANSFERRIN SERPL-MCNC: 238 MG/DL (ref 212–360)

## 2021-04-30 ENCOUNTER — AMBULATORY - HEALTHEAST (OUTPATIENT)
Dept: INTERVENTIONAL RADIOLOGY/VASCULAR | Facility: HOSPITAL | Age: 56
End: 2021-04-30

## 2021-04-30 DIAGNOSIS — Z11.59 ENCOUNTER FOR SCREENING FOR OTHER VIRAL DISEASES: ICD-10-CM

## 2021-05-12 ENCOUNTER — COMMUNICATION - HEALTHEAST (OUTPATIENT)
Dept: SCHEDULING | Facility: CLINIC | Age: 56
End: 2021-05-12

## 2021-05-14 ENCOUNTER — AMBULATORY - HEALTHEAST (OUTPATIENT)
Dept: LAB | Facility: CLINIC | Age: 56
End: 2021-05-14

## 2021-05-14 DIAGNOSIS — Z11.59 ENCOUNTER FOR SCREENING FOR OTHER VIRAL DISEASES: ICD-10-CM

## 2021-05-15 ENCOUNTER — COMMUNICATION - HEALTHEAST (OUTPATIENT)
Dept: SCHEDULING | Facility: CLINIC | Age: 56
End: 2021-05-15

## 2021-05-17 ENCOUNTER — HOSPITAL ENCOUNTER (OUTPATIENT)
Dept: INTERVENTIONAL RADIOLOGY/VASCULAR | Facility: HOSPITAL | Age: 56
Discharge: HOME | End: 2021-05-17
Attending: PHYSICIAN ASSISTANT | Admitting: RADIOLOGY
Payer: MEDICARE

## 2021-05-17 ENCOUNTER — RECORDS - HEALTHEAST (OUTPATIENT)
Dept: ADMINISTRATIVE | Facility: OTHER | Age: 56
End: 2021-05-17

## 2021-05-17 DIAGNOSIS — R63.30 FEEDING DIFFICULTIES: ICD-10-CM

## 2021-05-18 ENCOUNTER — RECORDS - HEALTHEAST (OUTPATIENT)
Dept: INTERVENTIONAL RADIOLOGY/VASCULAR | Facility: HOSPITAL | Age: 56
End: 2021-05-18

## 2021-05-18 DIAGNOSIS — R13.10 DYSPHAGIA, UNSPECIFIED TYPE: ICD-10-CM

## 2021-05-24 ENCOUNTER — RECORDS - HEALTHEAST (OUTPATIENT)
Dept: ADMINISTRATIVE | Facility: CLINIC | Age: 56
End: 2021-05-24

## 2021-05-25 ENCOUNTER — RECORDS - HEALTHEAST (OUTPATIENT)
Dept: ADMINISTRATIVE | Facility: CLINIC | Age: 56
End: 2021-05-25

## 2021-05-26 ENCOUNTER — RECORDS - HEALTHEAST (OUTPATIENT)
Dept: ADMINISTRATIVE | Facility: CLINIC | Age: 56
End: 2021-05-26

## 2021-05-26 VITALS
DIASTOLIC BLOOD PRESSURE: 62 MMHG | TEMPERATURE: 97.8 F | HEART RATE: 82 BPM | SYSTOLIC BLOOD PRESSURE: 106 MMHG | RESPIRATION RATE: 16 BRPM

## 2021-05-26 VITALS — DIASTOLIC BLOOD PRESSURE: 80 MMHG | SYSTOLIC BLOOD PRESSURE: 120 MMHG | HEART RATE: 64 BPM | TEMPERATURE: 98 F

## 2021-05-27 ENCOUNTER — RECORDS - HEALTHEAST (OUTPATIENT)
Dept: ADMINISTRATIVE | Facility: CLINIC | Age: 56
End: 2021-05-27

## 2021-05-28 ENCOUNTER — RECORDS - HEALTHEAST (OUTPATIENT)
Dept: ADMINISTRATIVE | Facility: CLINIC | Age: 56
End: 2021-05-28

## 2021-05-28 NOTE — PROCEDURES
INTERVENTIONAL RADIOLOGY    Procedure:  GJ change    Meds:  None  MD:  Monserrat  Complications:  None  Contrast:  30 cc  FT:  1.2 min    Findings:   Clogged J port.  New 18 Fr 45 cm GJ tube placed, tip in proximal jejunum.    Plan:  OK to use GJ tube

## 2021-05-28 NOTE — TELEPHONE ENCOUNTER
External - [facility]   Referring Provider: Dr. Clement  DX: Osteoporosis     Ref./rec. Were received in Osteo Consult folder on 04.29.2019 @ 9:43     Per referral note- non-verbal group home patient.  Requesting [facility]

## 2021-05-29 ENCOUNTER — RECORDS - HEALTHEAST (OUTPATIENT)
Dept: ADMINISTRATIVE | Facility: CLINIC | Age: 56
End: 2021-05-29

## 2021-05-30 ENCOUNTER — RECORDS - HEALTHEAST (OUTPATIENT)
Dept: ADMINISTRATIVE | Facility: CLINIC | Age: 56
End: 2021-05-30

## 2021-05-31 ENCOUNTER — RECORDS - HEALTHEAST (OUTPATIENT)
Dept: ADMINISTRATIVE | Facility: CLINIC | Age: 56
End: 2021-05-31

## 2021-05-31 NOTE — TELEPHONE ENCOUNTER
Please enter orders for x-ray and MRI to be sent to [facility].    [facility] when complete so this can be scheduled.

## 2021-05-31 NOTE — PROGRESS NOTES
FOOT AND ANKLE SURGERY/PODIATRY CONSULT NOTE         ASSESSMENT:   Cellulitis fifth toe right foot  Granuloma fifth toe right foot      TREATMENT:  The patient was placed on Keflex 500 mg 1 tab 4 times daily x10 days.  I have also recommended x-rays and MRI of the right foot to rule out osteomyelitis fifth toe right foot        HPI:Elysia Arellano presented to the clinic today accompanied by her healthcare worker.  The patient is unable to respond or verbalize.  Healthcare worker indicated that she is at the clinic today because several days ago the fifth toenail on her right foot was removed.  Since that time there has been some redness on the side of the fifth toe right foot.  The patient has not responded to any pain from the fifth toe right foot.  He has not been any active drainage of bleeding.  The patient is confined to wheelchair.      Past Medical History:   Diagnosis Date     Cerebral palsy (H)      Depression      GERD (gastroesophageal reflux disease)      Hyperopia      Mental retardation      Osteoporosis      Pyelonephritis      Scoliosis      Seizure disorder (H)      UTI (urinary tract infection)        Past Surgical History:   Procedure Laterality Date     IR GJ TUBE REPLACEMENT  11/13/2018     IR GJ TUBE REPLACEMENT  1/8/2019     IR GJ TUBE REPLACEMENT  2/6/2019     IR GJ TUBE REPLACEMENT  4/29/2019     IR GJ TUBE REPLACEMENT  6/25/2019     SPINAL FUSION         Allergies   Allergen Reactions     Ciprofloxacin Rash     Aspirin      Ibuprofen      Nsaids (Non-Steroidal Anti-Inflammatory Drug)      Ointment Base (Emulsifying) [Min Oil-Wh.Pet-Emuls Wax(Bulk)] Rash     A and D ointment         Current Outpatient Medications:      acetaminophen (TYLENOL) 325 MG tablet, 650 mg by G-tube route every 4 (four) hours as needed for pain or fever. , Disp: , Rfl:      arginine-vitamin C-vitamin E (ARGINAID) 4.5 gram-156 mg/9.2 gram PwPk, 1 packet by G-tube route 2 (two) times a day. At 0700, 1800.  Mix with 4  oz of water, Disp: , Rfl:      bisacodyl (DULCOLAX) 10 mg suppository, Insert 10 mg into the rectum every other day. At bedtime   , Disp: , Rfl:      calcium carbonate 500 mg/5 mL (1,250 mg/5 mL) suspension, 600 mg by G-tube route bedtime. , Disp: , Rfl:      calcium-vitamin D 500 mg(1,250mg) -200 unit per tablet, None Entered, Disp: , Rfl:      carboxymethylcellulose sodium (REFRESH LIQUIGEL OPHT), Administer 1 drop to both eyes at bedtime., Disp: , Rfl:      cholecalciferol, vitamin D3, 1,000 unit tablet, 2,000 Units by G-tube route daily., Disp: , Rfl:      clonazePAM (KLONOPIN) 0.5 MG tablet, 0.5 tablets (0.25 mg total) by G-tube route at bedtime., Disp: 30 tablet, Rfl: 0     desonide (DESOWEN) 0.05 % cream, Apply 1 application topically 2 (two) times a day. At 0700, 1800. Apply to red, itchy, scaly areas of face. Hold medication if no areas of red, itchy, scaly areas on face., Disp: , Rfl:      diazePAM (DIASTAT ACUDIAL) 5-7.5-10 mg Kit, Insert 10 mg into the rectum as needed (for seizure lasting > 5 mins)., Disp: , Rfl:      diazepam intensol (VALIUM) 5 mg/mL Conc concentrated solution, 1 mL (5 mg total) by Enteral Tube route as needed (for seizure)., Disp: 30 mL, Rfl: 0     diphenhydrAMINE (BENADRYL) 12.5 mg/5 mL liquid, 25 mg by Enteral Tube route 4 (four) times a day as needed for allergies. , Disp: , Rfl:      folic acid (FOLVITE) 1 MG tablet, 1 mg by G-tube route daily. Takes at Midnight, Disp: , Rfl:      GUAIFENESIN/DEXTROMETHORPHAN (ROBITUSSIN-DM ORAL), 10 mL by Enteral Tube route every 6 (six) hours as needed. , Disp: , Rfl:      hydrocortisone 1 % cream, Apply 1 application topically 2 (two) times a day as needed., Disp: , Rfl:      lamoTRIgine (LAMICTAL) 200 MG tablet, 200 mg by G-tube route 2 (two) times a day. 0400, 1400, Disp: , Rfl:      lamoTRIgine (LAMICTAL) 25 MG tablet, 75 mg by G-tube route 2 (two) times a day. 0400, 1400, Disp: , Rfl:      levETIRAcetam (KEPPRA) 100 mg/mL solution, 750 mg  by G-tube route 2 (two) times a day. 0700, 2100, Disp: , Rfl:      levOCARNitine (CARNITOR) 100 mg/mL solution, None Entered, Disp: , Rfl:      levothyroxine (SYNTHROID, LEVOTHROID) 50 MCG tablet, 50 mcg by G-tube route daily. At 1800, Disp: , Rfl:      multivit-mins-ferrous gluconat 9 mg iron/15 mL Liqd, 15 mL by G-tube route daily., Disp: , Rfl:      multivitamin with minerals (THERA M PLUS, FERROUS FUMARAT,) 9 mg iron-400 mcg Tab tablet, Take 1 tablet by mouth., Disp: , Rfl:      nitrofurantoin (FURADANTIN) 25 mg/5 mL suspension, Take 100 mg by mouth., Disp: , Rfl:      nitrofurantoin (MACRODANTIN) 100 MG capsule, , Disp: , Rfl:      nystatin (MYCOSTATIN) cream, Apply 1 application topically 2 (two) times a day. At 0700, 2100. Apply to G tube site, Disp: , Rfl:      nystatin (MYCOSTATIN) cream, Apply 1 application topically as needed., Disp: , Rfl:      nystatin-emollient combo no.88 100,000 unit/gram cpgp, None Entered, Disp: , Rfl:      polyethylene glycol (MIRALAX) 17 gram packet, 17 g by Enteral Tube route daily as needed. Mix in 150cc water and administer through G tube, Disp: , Rfl:      psyllium with dextrose (METAMUCIL JAR) powder, by G-tube route 2 (two) times a day. At 1800, 2100. Dose = 2 Tbsp in 4 ounces of water, Disp: , Rfl:      ranitidine (ZANTAC) 150 MG tablet, 150 mg by G-tube route every 12 (twelve) hours. Noon and Midnight, Disp: , Rfl:      sodium phosphates 133 mL (FOR FLEET) 19-7 gram/118 mL Enem rectal enema, Insert 1 enema into the rectum every other day as needed for constipation (if no results from suppository)., Disp: , Rfl:      sulfamethoxazole-trimethoprim (SEPTRA DS) 800-160 mg per tablet, , Disp: , Rfl:      topiramate (TOPAMAX) 200 MG tablet, 1 tablet (200 mg total) by G-tube route 2 (two) times a day., Disp: 60 tablet, Rfl: 0     whey protein isolate (BENEPROTEIN) 6 gram-25 kcal/7 gram PwPk, 7 g by G-tube route., Disp: , Rfl:      cephalexin (KEFLEX) 500 MG capsule, Take 1  capsule (500 mg total) by mouth 4 (four) times a day for 10 days., Disp: 40 capsule, Rfl: 0    History reviewed. No pertinent family history.    Social History     Socioeconomic History     Marital status: Single     Spouse name: Not on file     Number of children: Not on file     Years of education: Not on file     Highest education level: Not on file   Occupational History     Not on file   Social Needs     Financial resource strain: Not on file     Food insecurity:     Worry: Not on file     Inability: Not on file     Transportation needs:     Medical: Not on file     Non-medical: Not on file   Tobacco Use     Smoking status: Never Smoker     Smokeless tobacco: Never Used   Substance and Sexual Activity     Alcohol use: No     Drug use: No     Sexual activity: Not on file   Lifestyle     Physical activity:     Days per week: Not on file     Minutes per session: Not on file     Stress: Not on file   Relationships     Social connections:     Talks on phone: Not on file     Gets together: Not on file     Attends [employer] service: Not on file     Active member of club or organization: Not on file     Attends meetings of clubs or organizations: Not on file     Relationship status: Not on file     Intimate partner violence:     Fear of current or ex partner: Not on file     Emotionally abused: Not on file     Physically abused: Not on file     Forced sexual activity: Not on file   Other Topics Concern     Not on file   Social History Narrative     Not on file       Review of Systems - Patient denies fever, chills, rash, wound, stiffness, limping, numbness, weakness, heart burn, blood in stool, chest pain with activity, calf pain when walking, shortness of breath with activity, chronic cough, easy bleeding/bruising, swelling of ankles, excessive thirst, fatigue, depression, anxiety.       OBJECTIVE:  Appearance: alert, well appearing, and in no distress and chronically ill appearing.    Vitals:    08/27/19 1414    Pulse: 76   SpO2: 98%       BMI= Body mass index is 23.83 kg/m .    General appearance: Patient is alert and fully cooperative with history & exam.  No sign of distress is noted during the visit.  Psychiatric: Affect is pleasant & appropriate.  Patient appears motivated to improve health.  Respiratory: Breathing is regular & unlabored while sitting.  HEENT: Hearing is intact to spoken word.  Speech is clear.  No gross evidence of visual impairment that would impact ambulation.    Vascular: Dorsalis pedis and posterior tibial pulses are diminished bilaterally.  There is no pedal hair growth bilaterally.  CFT < 3 sec from anterior tibial surface to distal digits bilaterally. There is no appreciable edema noted.  Dermatologic: There is a round erythematous necrotic open wound on the lateral aspect of the fifth toe right foot.  No active drainage or bleeding noted.  There is odor noted.  Turgor and texture are within normal limits. No coloration or temperature changes. No other primary or secondary lesions noted.  Neurologic: All epicritic and proprioceptive sensations are grossly diminished bilaterally.   Musculoskeletal: All active and passive ankle, subtalar, midtarsal, and 1st MPJ range of motion are grossly intact without pain or crepitus, with the exception of none. Manual muscle strength is +0/5 bilaterally. All dorsiflexors, plantarflexors, invertors, evertors are diminished bilaterally.  No tenderness present to fifth toe right foot on palpation.     Imaging:     No results found.       TREATMENT:  The patient was placed on Keflex 500 mg 1 tab 4 times daily x10 days.  I have also recommended x-rays and MRI of the right foot to rule out osteomyelitis fifth toe right foot.  The patient is to return to the clinic in 1 week.    Wade Garcia; NISHA  [facility] Foot & Ankle Surgery/Podiatry

## 2021-06-01 ENCOUNTER — RECORDS - HEALTHEAST (OUTPATIENT)
Dept: ADMINISTRATIVE | Facility: CLINIC | Age: 56
End: 2021-06-01

## 2021-06-01 NOTE — TELEPHONE ENCOUNTER
Per Dr Paul, please call pt to reschedule consult appointment 9/30/19 to a follow up around 10/7/19. Pt was seen today in hospital and currently discharging.

## 2021-06-01 NOTE — PROGRESS NOTES
Pt arrived via own wheelchair for gj change due to broken tube. Caregiver present and denies changes or concerns with status. Transferred to cart via ryan lift.

## 2021-06-01 NOTE — PROGRESS NOTES
Patient return to the clinic today along with her caretaker to discuss the findings of her MRI of her right foot.  The patient has a chronic ulcer on the fifth toe right foot.  The MRI indicated that the patient has underlying osseous changes secondary to osteomyelitis fifth toe right foot.  I informed the caretaker that there are 2 options for treating the osteomyelitis.  Patient can have continued IV antibiotic therapy for several weeks.  The patient is a poor surgical candidate.  She would be unable to cooperate fully if she were to have surgical procedure.  I informed the caretaker that the second option would indeed be surgical rotation of the fifth toe right foot.  The caretaker indicated that he would like to try to resolve the infection with IV antibiotics.  The patient has been referred to infectious disease for follow-up evaluation and treatment.  I did inform the caretaker that if this is unsuccessful amputation of the fifth toe would be necessary.

## 2021-06-02 ENCOUNTER — RECORDS - HEALTHEAST (OUTPATIENT)
Dept: ADMINISTRATIVE | Facility: CLINIC | Age: 56
End: 2021-06-02

## 2021-06-02 NOTE — TELEPHONE ENCOUNTER
Gunnar GUTIERREZ calling back with additional questions.    He would like a call back from Jacqueline.    Gunnar @ [phone]

## 2021-06-02 NOTE — TELEPHONE ENCOUNTER
Per Dr Paul: Prescription for doxycycline.  She has MRSA on that wound.     Gunnar GUTIERREZ notified and understands. No questions

## 2021-06-02 NOTE — TELEPHONE ENCOUNTER
Per Dr Paul's progress notes from 10/07/19:  If x-ray shows bony progression or CRP is elevated, could consider IV antibiotics.  Alternatively, surgical amputation may be more expeditious.  I would be concerned that she may have trouble tolerating IV antibiotics.    We will update [facility] staff on plan

## 2021-06-02 NOTE — PROGRESS NOTES
Assessment:       Impression: Radiographic evidence of osteomyelitis in the right fifth toe.  Also her on lateral aspect of right fifth toe has reappeared.  Some serous drainage, not quite erik pus CRP is undetectable.  While this does not exclude acute osteomyelitis, it makes it less likely.      Admission for uncontrolled seizures.  Patient is on a multitude of antiseizure medications.       Plan:     Orders Placed This Encounter   Procedures     Wound culture, gram stain     XR Toe Right 2 or More VWS     C-Reactive Protein        If x-ray shows bony progression or CRP is elevated, could consider IV antibiotics.  Alternatively, surgical amputation may be more expeditious.  I would be concerned that she may have trouble tolerating IV antibiotics.     Subjective:      This is an follow-up infectious Disease visit for Elysia Arellano, who is a 54 y.o.  referred for evaluation of right fifth toe osteomyelitis.     Since leaving the hospital, she has developed increased ulcer on the lateral aspect of her right fifth toe.  There is no erythema or swelling going up the foot.  According to the caregiver who is with her today, she has not had any fevers, chills, sweats.  She had no further seizures.    When I saw her in the hospital earlier, she been admitted because of seizures, not because of the foot problem.    The following portions of the patient's history were reviewed and updated as appropriate: allergies, current medications, past family history, past medical history, past social history, past surgical history and problem list.      Review of Systems  Performed and all negative except as mentioned above.      Objective:      /80 (Patient Site: Right Arm, Patient Position: Sitting, Cuff Size: Adult Regular)   Pulse 64   Temp 98  F (36.7  C)   General:   alert, appears stated age and slowed mentation   Oropharynx:  lips, mucosa, and tongue normal; teeth and gums normal    Eyes:   Eyes closed during my  visit    Ears:   Deferred   Neck:  no adenopathy and supple, symmetrical, trachea midline   Thyroid:   Deferred   Lung:  clear to auscultation bilaterally   Heart:   regular rate and rhythm, S1, S2 normal, no murmur, click, rub or gallop   Abdomen:  Abdomen is soft   Extremities:  She is proximally half centimeter size ulcer on the lateral aspect of the right fifth toe.  There is mostly serous drainage present.  He does not seem to be bothered by my touching the foot.  Her caregiver who is with her tells me that she will frequently withdraw from the touch when it is painful.   Skin:  warm and dry, no hyperpigmentation, vitiligo, or suspicious lesions   CVA:   Deferred   Genitourinary:  defer exam   Neurological:   Sitting in chair, not terribly responsive to me.   Psychiatric:   nonfocal            Devon Paul MD

## 2021-06-02 NOTE — TELEPHONE ENCOUNTER
Pt is calling requesting an electric scooter. She stated that her pain and swelling in her legs has worsened since her f/u with Dr. Green on

## 2021-06-02 NOTE — PROGRESS NOTES
Culture returned with MRSA.    Xray and low crp do not suggest osteomyeilitis.    Can try oral doxycycline for 10 days.    Devon Paul MD

## 2021-06-02 NOTE — TELEPHONE ENCOUNTER
Xray shows no osteomyelitis and crp is undetectable.    I am not recommending IV abx at this time.    We could go with oral antibiotics, to be directed by culture result, which is still pending.    Devon Paul MD

## 2021-06-02 NOTE — TELEPHONE ENCOUNTER
----- Message from Wade Garcia DPM sent at 9/30/2019 12:04 PM CDT -----  If there he has not been any significant changes in the wound she can wait and be evaluated by infectious disease. Dr. Garcia  ----- Message -----  From: Marcia Madrigal CMA  Sent: 9/30/2019  10:43 AM CDT  To: Wade Garcia DPM    Patient was last seen on 9/17/19 to review MRI results. MRI indicated osteomyelitis of the 5th digit on the right foot. Patient was referred to ID for IV Abx. Linden from the patient's group home stopped by today. The appointment with ID was moved from 9/30/19 to 10/7/19. He is concerned that the patient hasn't been on any antibiotics for a while and wonders if you would like to prescribe oral antibiotics until he is seen with ID. Please advise, Linden would like a call back at [phone].

## 2021-06-02 NOTE — TELEPHONE ENCOUNTER
I called Elysia and let group home know that Dr Garcia is recommend getting evaluated by infectious disease.Laura has appointment today.

## 2021-06-02 NOTE — TELEPHONE ENCOUNTER
Grabiel called for verbal order for liquid doxy so the nurse does not have to dissolve a capsule in water. I informed liquid doxycycline is ok. The Pharmacist will make the change.

## 2021-06-03 ENCOUNTER — RECORDS - HEALTHEAST (OUTPATIENT)
Dept: ADMINISTRATIVE | Facility: CLINIC | Age: 56
End: 2021-06-03

## 2021-06-03 VITALS — HEIGHT: 59 IN | BODY MASS INDEX: 23.79 KG/M2 | WEIGHT: 118 LBS

## 2021-06-03 VITALS — BODY MASS INDEX: 23.79 KG/M2 | HEART RATE: 73 BPM | OXYGEN SATURATION: 93 % | WEIGHT: 118 LBS | HEIGHT: 59 IN

## 2021-06-03 VITALS — SYSTOLIC BLOOD PRESSURE: 100 MMHG | HEART RATE: 72 BPM | TEMPERATURE: 100.5 F | DIASTOLIC BLOOD PRESSURE: 72 MMHG

## 2021-06-03 NOTE — ANESTHESIA POSTPROCEDURE EVALUATION
Patient: Elysia Arellano  AMPUTATION, TOE fifth toe right foot  Anesthesia type: MAC    Patient location: PACU  Last vitals:   Vitals Value Taken Time   /67 11/21/2019  4:15 PM   Temp 36.6  C (97.9  F) 11/21/2019  3:45 PM   Pulse 64 11/21/2019  4:19 PM   Resp 26 11/21/2019  4:19 PM   SpO2 96 % 11/21/2019  4:19 PM   Vitals shown include unvalidated device data.  Post vital signs: stable  Level of consciousness: return to baseline and Nonverbal at baseline. No agitation noted.   Post-anesthesia pain: pain controlled  Post-anesthesia nausea and vomiting: no  Pulmonary: unassisted, return to baseline  Cardiovascular: stable and blood pressure at baseline  Hydration: adequate  Anesthetic events: no    QCDR Measures:  ASA# 11 - Julianne-op Cardiac Arrest: ASA11B - Patient did NOT experience unanticipated cardiac arrest  ASA# 12 - Julianne-op Mortality Rate: ASA12B - Patient did NOT die  ASA# 13 - PACU Re-Intubation Rate: ASA13B - Patient did NOT require a new airway mgmt  ASA# 10 - Composite Anes Safety: ASA10A - No serious adverse event    Additional Notes:

## 2021-06-03 NOTE — TELEPHONE ENCOUNTER
Dr Paul is out for the day and pt is not on current abx treatment.    Maybe Dr Wade Marsh's is better to answer this question, having done the surgery.

## 2021-06-03 NOTE — TELEPHONE ENCOUNTER
"[facility]      Group home calling. pt just had surgery, trying to figure out if needs to \"open it up\", \"change dressing\", and will needs orders. Please call them @ [phone]   "

## 2021-06-03 NOTE — PROGRESS NOTES
Assessment:       Impression: Conflicting evidence of osteomyelitis right fifth toe.  MRI was suggestive, but serial x-rays and CRP are not..    Patient has infected ulcer that grew MRSA on last visit.     Admission for uncontrolled seizures.  Patient is on a multitude of antiseizure medications.       Plan:     Orders Placed This Encounter   Procedures     Wound culture, gram stain     MR Forefoot With Without Contrast Right     C-reactive protein     Will prescribe clindamycin pending MRI report and culture results.    If progression of changes on MRI and still colonized or infected with MRSA, would recommend 6-week course of IV vancomycin.     If x-ray shows bony progression or CRP is elevated, could consider IV antibiotics.  Alternatively, surgical amputation may be more expeditious.  I would be concerned that she may have trouble tolerating IV antibiotics.     Subjective:      This is an follow-up infectious Disease visit for Elysia Arellano, who is a 54 y.o.  referred for evaluation of right fifth toe osteomyelitis.     Since leaving the hospital, she has developed increased ulcer on the lateral aspect of her right fifth toe.  There is no erythema or swelling going up the foot.  According to the caregiver who is with her today, she has not had any fevers, chills, sweats.  She had no further seizures.    When I saw her in the hospital earlier, she been admitted because of seizures, not because of the foot problem.    Follow-up visit on November fourth, 2019:    On her last visit here, we did establish that she had MRSA infection in the right toe.  I prescribed some doxycycline which she took by G-tube and did help reduce the inflammation.    However, within a week or so stopping the antibiotic she again had increasing redness and persistent drainage on the right toe.    Patient is a course nonverbal.  She does seem a little uncomfortable when the toe was manipulated.  She has a low-grade fever of 100.5 orally  today.    The following portions of the patient's history were reviewed and updated as appropriate: allergies, current medications, past family history, past medical history, past social history, past surgical history and problem list.      Review of Systems  Performed and all negative except as mentioned above.      Objective:      /72 (Patient Site: Right Arm, Patient Position: Sitting, Cuff Size: Adult Regular)   Pulse 72   Temp 100.5  F (38.1  C)   General:   alert, appears stated age and slowed mentation   Oropharynx:  lips, mucosa, and tongue normal; teeth and gums normal    Eyes:   Eyes closed during my visit    Ears:   Deferred   Neck:  no adenopathy and supple, symmetrical, trachea midline   Thyroid:   Deferred   Lung:  clear to auscultation bilaterally   Heart:   regular rate and rhythm, S1, S2 normal, no murmur, click, rub or gallop   Abdomen:  Abdomen is soft   Extremities:  She is approximately half centimeter size ulcer on the lateral aspect of the right fifth toe.  There is mostly serous drainage present.  She is a bit more uncomfortable with touching the foot.  Her caregiver who is with her tells me that she will frequently withdraw from the touch when it is painful.   Skin:  warm and dry, no hyperpigmentation, vitiligo, or suspicious lesions   CVA:   Deferred   Genitourinary:  defer exam   Neurological:   Sitting in chair, not terribly responsive to me.   Psychiatric:   nonfocal            Devon Paul MD

## 2021-06-03 NOTE — TELEPHONE ENCOUNTER
Mother called asking to speak to Dr. Garcia regarding Elysia's current hospitalization. I let her know the hospital has placed a consult and he would be coming to see the patient some time today.    Mom will be at the hospital from 1:00pm  until evening. She wants to go ahead with amputation while Elysia is in hospital.    While typing this message I was informed KA would consult over lunch hour. Information can be relayed by hospital staff when she arrives.

## 2021-06-03 NOTE — PATIENT INSTRUCTIONS - HE
Please contact Dr. Garcia, podiatrist, about concerns about amputation.  [phone]    Dr Paul's concern is that IV antibiotics may be difficult, not well tolerated, and ultimately not effective.

## 2021-06-03 NOTE — ANESTHESIA PREPROCEDURE EVALUATION
Anesthesia Evaluation      Patient summary reviewed   No history of anesthetic complications     Airway   Comment: Unable to assess   Pulmonary - negative ROS and normal exam                          Cardiovascular - normal exam  Exercise tolerance: < 4 METS   Neuro/Psych    (+) seizures well controlled, neuromuscular disease,      Comments: Cerebral palsy, nonverbal. Seizure disorder, well controlled outside of infections. Last seizure 11/18/2019 in setting of UTI.     Endo/Other - negative ROS      GI/Hepatic/Renal    (+) GERD,       Comments: Tube feed dependent.           Dental      Comment: Per mom, nothing removable from mouth                       Anesthesia Plan  Planned anesthetic: MAC    ASA 3   Induction: intravenous   Anesthetic plan and risks discussed with: parent/guardian and patient    Post-op plan: routine recovery  DNR/DNI status   DNR/DNI status discussed with mother.  Plan is for suspension of DNR

## 2021-06-03 NOTE — ANESTHESIA CARE TRANSFER NOTE
Last vitals:   Vitals:    11/21/19 1545   BP: 110/75   Pulse: 68   Resp: 18   Temp: 36.6  C (97.9  F)   SpO2: 100%     Patient's level of consciousness is drowsy but back to baseline. Patient sent to PACU post-op for monitoring before transfer back to room due to mental status.   Spontaneous respirations: yes  Maintains airway independently: yes  Dentition unchanged: yes  Oropharynx: oropharynx clear of all foreign objects    QCDR Measures:  ASA# 20 - Surgical Safety Checklist: WHO surgical safety checklist completed prior to induction    PQRS# 430 - Adult PONV Prevention: 4558F - Pt received => 2 anti-emetic agents (different classes) preop & intraop (propofol drip and zofran)  ASA# 8 - Peds PONV Prevention: NA - Not pediatric patient, not GA or 2 or more risk factors NOT present  PQRS# 424 - Julianne-op Temp Management: 4559F - At least one body temp DOCUMENTED => 35.5C or 95.9F within required timeframe  PQRS# 426 - PACU Transfer Protocol: - Transfer of care checklist used  ASA# 14 - Acute Post-op Pain: ASA14B - Patient did NOT experience pain >= 7 out of 10

## 2021-06-03 NOTE — PROGRESS NOTES
"Assessment:       Impression: Conflicting evidence of osteomyelitis right fifth toe.  Repeat MRI scan again shows osteomyelitis and likely sinus tract.  Patient has infected ulcer that grew MRSA on last visit.    Unclear if IV antibiotics will be curative, given the persistent absence of neutrophils on Gram stain.  This is typically suggestive of poor circulation, which likely means antimicrobial chemotherapy would not be effective.            Plan:     No orders of the defined types were placed in this encounter.    Long discussion with patient's mother about current state.  I had her sign a consent for PICC line if she chooses to do 6 weeks of IV antibiotics.  I encouraged her to talk with Dr. Garcia, the podiatrist who saw Elysia earlier, who is concerned that Elysia could not \"be cooperative\" with the surgical procedure    If after discussion with Dr. Garcia patient's mother would like us to proceed with IV antibiotics, will look into either IV vancomycin or IV daptomycin for treatment of her osteomyelitis.      Subjective:      This is an follow-up infectious Disease visit for Elysia Arellano, who is a 54 y.o.  referred for evaluation of right fifth toe osteomyelitis.     Since leaving the hospital, she has developed increased ulcer on the lateral aspect of her right fifth toe.  There is no erythema or swelling going up the foot.  According to the caregiver who is with her today, she has not had any fevers, chills, sweats.  She had no further seizures.    When I saw her in the hospital earlier, she been admitted because of seizures, not because of the foot problem.    Follow-up visit on November fourth, 2019:    On her last visit here, we did establish that she had MRSA infection in the right toe.  I prescribed some doxycycline which she took by G-tube and did help reduce the inflammation.    However, within a week or so stopping the antibiotic she again had increasing redness and persistent drainage on the right " "toe.    Patient is a course nonverbal.  She does seem a little uncomfortable when the toe was manipulated.  She has a low-grade fever of 100.5 orally today.    Follow-up visit on November 18, 2019:    Patient is doing well at this time.  She finished a course of clindamycin about a week ago.  Again, she had resolution of drainage swelling and erythema in the toe.    She also had an MRI scan which again shows osteomyelitis and likely sinus tract to the toe.    I requested the patient's mother come in to discuss the current state.  I suspect that Morgan has an infection in the bone, and that it will only be resolved either with amputation or possibly intravenous antibiotics.  I do not have confidence the IV antibiotics to work his Gram stain is persistently show absence of neutrophils, which typically suggests that there is poor circulation to the bone.    I have encouraged Morgan's mother to call the podiatrist, Dr. Garcia, to discuss concerns about patient's ability to \"be cooperative\" with surgery.  Patient is wheelchair-bound.    The following portions of the patient's history were reviewed and updated as appropriate: allergies, current medications, past family history, past medical history, past social history, past surgical history and problem list.      Review of Systems  Performed and all negative except as mentioned above.      Objective:      /62   Pulse 82   Temp 97.8  F (36.6  C)   Resp 16   General:   alert, appears stated age and slowed mentation   Oropharynx:  lips, mucosa, and tongue normal; teeth and gums normal    Eyes:   Eyes closed during my visit    Ears:   Deferred   Neck:  no adenopathy and supple, symmetrical, trachea midline   Thyroid:   Deferred   Lung:  clear to auscultation bilaterally   Heart:   regular rate and rhythm, S1, S2 normal, no murmur, click, rub or gallop   Abdomen:  Abdomen is soft   Extremities:  She is approximately half centimeter size ulcer on the lateral aspect of " the right fifth toe. There is minimal erythema and swelling.  There is no active drainage at this time.   Skin:  warm and dry, no hyperpigmentation, vitiligo, or suspicious lesions   CVA:   Deferred   Genitourinary:  defer exam   Neurological:   Sitting in chair, not terribly responsive to me.   Psychiatric:   nonfocal            Devon Paul MD

## 2021-06-03 NOTE — TELEPHONE ENCOUNTER
Contacted group home. Scheduled post op appt for 12/5 with Dr Garcia. Pt surgery was 11/21 and has had no post op appt yet.

## 2021-06-03 NOTE — PATIENT INSTRUCTIONS - HE
Dressing change, daily dry guaze.    Will try oral clindamycin pending follow up MRI, culture and blood tests.  If MRI shows progression of bone infection, will need IV vancomycin twice a day for 4 to 6 weeks.

## 2021-06-04 VITALS — HEART RATE: 68 BPM | DIASTOLIC BLOOD PRESSURE: 66 MMHG | SYSTOLIC BLOOD PRESSURE: 96 MMHG | TEMPERATURE: 98.1 F

## 2021-06-04 VITALS
OXYGEN SATURATION: 97 % | HEIGHT: 59 IN | HEART RATE: 83 BPM | TEMPERATURE: 99 F | WEIGHT: 116 LBS | BODY MASS INDEX: 23.39 KG/M2

## 2021-06-04 VITALS — HEIGHT: 59 IN | BODY MASS INDEX: 23.56 KG/M2 | WEIGHT: 116.9 LBS

## 2021-06-04 NOTE — PROGRESS NOTES
Subjective findings: The patient return to the clinic today for postop visit #1, status post amputation fifth toe right foot.  The patient appears to be doing well without complication.  The patient is unresponsive.  Unable to obtain history from patient.    Objective findings: The dressings were removed and wound margins are well coaptated and maintained.  There is no edema, erythema, cellulitis, drainage or bleeding noted.  Neurovascular status is intact.  Vital signs are stable.    Assessment: Osteomyelitis fifth toe right foot    Plan: All sutures were removed today.  I informed the  workers that the patient may now return to normal activities without restriction.  The patient is to return to the clinic as needed.

## 2021-06-04 NOTE — TELEPHONE ENCOUNTER
[employer]    AVS summary that was sent with patient... does not state anything on next steps. It needs to be more clear with directions. It also did not belong to the patient.    Please send to: fax , any questions, group home phone

## 2021-06-04 NOTE — TELEPHONE ENCOUNTER
Faxed correct avs with handwritten notes per Dr Paul's plan from ov today.    Messaged facility to disregard and shred avs received during pt's visit.

## 2021-06-04 NOTE — PROGRESS NOTES
Assessment:       Impression: Patient underwent amputation of right fifth digit and recent hospitalization by Dr. Garcia.    Wound appears to be healing well.                Plan:     No orders of the defined types were placed in this encounter.    The amputation should have been curative and patient appears to be doing well.    No further recommendations from infectious disease standpoint.    May return as needed.      Subjective:      This is an follow-up infectious Disease visit for Elysia Arellano, who is a 54 y.o.  referred for evaluation of right fifth toe osteomyelitis.     Since leaving the hospital, she has developed increased ulcer on the lateral aspect of her right fifth toe.  There is no erythema or swelling going up the foot.  According to the caregiver who is with her today, she has not had any fevers, chills, sweats.  She had no further seizures.    When I saw her in the hospital earlier, she been admitted because of seizures, not because of the foot problem.    Follow-up visit on November fourth, 2019:    On her last visit here, we did establish that she had MRSA infection in the right toe.  I prescribed some doxycycline which she took by G-tube and did help reduce the inflammation.    However, within a week or so stopping the antibiotic she again had increasing redness and persistent drainage on the right toe.    Patient is a course nonverbal.  She does seem a little uncomfortable when the toe was manipulated.  She has a low-grade fever of 100.5 orally today.    Follow-up visit on November 18, 2019:    Patient is doing well at this time.  She finished a course of clindamycin about a week ago.  Again, she had resolution of drainage swelling and erythema in the toe.    She also had an MRI scan which again shows osteomyelitis and likely sinus tract to the toe.    I requested the patient's mother come in to discuss the current state.  I suspect that Morgan has an infection in the bone, and that it will  "only be resolved either with amputation or possibly intravenous antibiotics.  I do not have confidence the IV antibiotics to work his Gram stain is persistently show absence of neutrophils, which typically suggests that there is poor circulation to the bone.    I have encouraged Morgan's mother to call the podiatrist, Dr. Garcia, to discuss concerns about patient's ability to \"be cooperative\" with surgery.  Patient is wheelchair-bound.    Follow-up visit on December 9, 2019:    Patient was recently mated to the hospital in the right fifth digit by Dr. Garcia.  She tolerated the procedure quite well.  She has had her sutures removed and the wound appears to be healing well.    No evidence for any active infection at this time.        The following portions of the patient's history were reviewed and updated as appropriate: allergies, current medications, past family history, past medical history, past social history, past surgical history and problem list.      Review of Systems  Performed and all negative except as mentioned above.      Objective:      BP 96/66 (Patient Site: Right Arm, Patient Position: Sitting, Cuff Size: Adult Regular)   Pulse 68   Temp 98.1  F (36.7  C)   LMP  (LMP Unknown)   General:   alert, appears stated age and slowed mentation   Oropharynx:  lips, mucosa, and tongue normal; teeth and gums normal    Eyes:   Eyes closed during my visit    Ears:   Deferred   Neck:  no adenopathy and supple, symmetrical, trachea midline   Thyroid:   Deferred   Lung:  Normal respiratory pattern           Extremities:  On the right foot, the wound appears to be clean dry and intact.   Skin:  warm and dry, no hyperpigmentation, vitiligo, or suspicious lesions   CVA:   Deferred   Genitourinary:  defer exam   Neurological:   Sitting in chair, not terribly responsive to me.   Psychiatric:   nonfocal            Devon Paul MD  "

## 2021-06-05 ENCOUNTER — RECORDS - HEALTHEAST (OUTPATIENT)
Dept: INTERVENTIONAL RADIOLOGY/VASCULAR | Facility: HOSPITAL | Age: 56
End: 2021-06-05

## 2021-06-05 DIAGNOSIS — R63.30 FEEDING DIFFICULTY AND MISMANAGEMENT: ICD-10-CM

## 2021-06-10 NOTE — PRE-PROCEDURE
Pre-Procedure Unconfirmed COVID Test     Elysia Arellano    COVID Screening  Due to the inability to confirm the patient's COVID status (positive or negative), the patient was screened for COVID symptoms     Patient reports the following:  Fever? No   Cough? No   Shortness of breath? No   Skin rash? No    If the patient is positive for new symptoms or worsening symptoms, and the procedure is deemed necessary by the ordering provider, notify your manager/supervisor     Patient Information  Patient informed of the no visitor policy  Patient instructed to continue to self-quarantine prior to procedure  Patient informed to contact the ordering provider if the following symptoms develop prior to procedure:   Fever  Cough  Shortness of Breath  Sore throat   Runny or stuffy nose  Muscle or body aches  Headaches  Fatigue  Vomiting or diarrhea   Rash    Adri Orr

## 2021-06-12 NOTE — PROCEDURES
IR Procedure Note    Physician: Troy Bowling MD    Procedure:  GJ tube exchange.     Findings/Plan:  Successful GJ tube exchange.  Tube can be used.

## 2021-06-13 NOTE — PROGRESS NOTES
Pt here for routine gj change. Presents with caregiver. Caregiver states no change in medications' status or allergies

## 2021-06-17 NOTE — TELEPHONE ENCOUNTER
"Data:   [facility] staff member calling from [facility] requesting to reschedule COVID test.       Action:   Call transferred to scheduling to reschedule COVID test.      Response:   Caller verbalizes understanding.     Linden Wheatley RN 5/12/2021 11:07 AM  [facility] Nurse Advisor.     COVID 19 Nurse Triage Plan/Patient Instructions    Please be aware that novel coronavirus (COVID-19) may be circulating in the community. If you develop symptoms such as fever, cough, or SOB or if you have concerns about the presence of another infection including coronavirus (COVID-19), please contact your health care provider or visit  [email or web address].    Disposition/Instructions    Additional COVID19 information to add for patients.   How can I protect others?  If you have symptoms (fever, cough, body aches or trouble breathing): Stay home and away from others (self-isolate) until:    At least 10 days have passed since your symptoms started, And     You ve had no fever--and no medicine that reduces fever--for 1 full day (24 hours), And      Your other symptoms have resolved (gotten better).     If you don t have symptoms, but a test showed that you have COVID-19 (you tested positive):    Stay home and away from others (self-isolate). Follow the tips under \"How do I self-isolate?\" below for 10 days (20 days if you have a weak immune system).    You don't need to be retested for COVID-19 before going back to school or work. As long as you're fever-free and feeling better, you can go back to school, work and other activities after waiting the 10 or 20 days.     How do I self-isolate?    Stay in your own room, even for meals. Use your own bathroom if you can.     Stay away from others in your home. No hugging, kissing or shaking hands. No visitors.    Don t go to work, school or anywhere else.     Clean  high touch  surfaces often (doorknobs, counters, handles, etc.). Use a household cleaning spray or wipes. You ll " find a full list on the EPA website:  www.epa.gov/pesticide-registration/list-n-disinfectants-use-against-sars-cov-2.    Cover your mouth and nose with a mask, tissue or washcloth to avoid spreading germs.    Wash your hands and face often. Use soap and water.    Caregivers in these groups are at risk for severe illness due to COVID-19:  o People 65 years and older  o People who live in a nursing home or long-term care facility  o People with chronic disease (lung, heart, cancer, diabetes, kidney, liver, immunologic)  o People who have a weakened immune system, including those who:  - Are in cancer treatment  - Take medicine that weakens the immune system, such as corticosteroids  - Had a bone marrow or organ transplant  - Have an immune deficiency  - Have poorly controlled HIV or AIDS  - Are obese (body mass index of 40 or higher)  - Smoke regularly    Caregivers should wear gloves while washing dishes, handling laundry and cleaning bedrooms and bathrooms.    Use caution when washing and drying laundry: Don t shake dirty laundry, and use the warmest water setting that you can.    For more tips, go to www.cdc.gov/coronavirus/2019-ncov/downloads/10Things.pdf.    How can I take care of myself?  1. Get lots of rest. Drink extra fluids (unless a doctor has told you not to).     2. Take Tylenol (acetaminophen) for fever or pain. If you have liver or kidney problems, ask your family doctor if it s okay to take Tylenol.     Adults can take either:     650 mg (two 325 mg pills) every 4 to 6 hours, or     1,000 mg (two 500 mg pills) every 8 hours as needed.     Note: Don t take more than 3,000 mg in one day.   Acetaminophen is found in many medicines (both prescribed and over-the-counter medicines). Read all labels to be sure you don t take too much.     For children, check the Tylenol bottle for the right dose. The dose is based on the child s age or weight.    3. If you have other health problems (like cancer, heart  failure, an organ transplant or severe kidney disease): Call your specialty clinic if you don t feel better in the next 2 days.    4. Know when to call 911: Emergency warning signs include:    Trouble breathing or shortness of breath    Pain or pressure in the chest that doesn t go away    Feeling confused like you haven t felt before, or not being able to wake up    Bluish-colored lips or face    What are the symptoms of COVID-19?     The most common symptoms are cough, fever and trouble breathing.     Less common symptoms include body aches, chills, diarrhea (loose, watery poops), fatigue (feeling very tired), headache, runny nose, sore throat and loss of smell.    COVID-19 can cause severe coughing (bronchitis) and lung infection (pneumonia).    How does it spread?     The virus may spread when a person coughs or sneezes into the air. The virus can travel about 6 feet this way, and it can live on surfaces.      Common  (household disinfectants) will kill the virus.    Who is at risk?  Anyone can catch COVID-19 if they re around someone who has the virus.    How can others protect themselves?     Stay away from people who have COVID-19 (or symptoms of COVID-19).    Wash hands often with soap and water. Or, use hand  with at least 60% alcohol.    Avoid touching the eyes, nose or mouth.     Wear a face mask when you go out in public, when sick or when caring for a sick person.    Where can I get more information?    [facility]: About COVID-19: [email or web address]    CDC: What to Do If You re Sick: www.cdc.gov/coronavirus/2019-ncov/about/steps-when-sick.html    CDC: Ending Home Isolation: www.cdc.gov/coronavirus/2019-ncov/hcp/disposition-in-home-patients.html     CDC: Caring for Someone: www.cdc.gov/coronavirus/2019-ncov/if-you-are-sick/care-for-someone.html     MD: Interim Guidance for Hospital Discharge to Home:  www.[email or web address]/coronavirus/hcp/hospdischarge.pdf    [facility] clinical trials (COVID-19 research studies): clinicalaffairs.[facility]-clinical-trials     Below are the COVID-19 hotlines at the [place] [facility] ([facility]). Interpreters are available.   o For health questions: Call 279-794-3182 or 1-622.369.4295 (7 a.m. to 7 p.m.)  o For questions about schools and childcare: Call 486-112-2272 or 1-370.292.2417 (7 a.m. to 7 p.m.)              Thank you for taking steps to prevent the spread of this virus.  o Limit your contact with others.  o Wear a simple mask to cover your cough.  o Wash your hands well and often.    Resources    [facility]: About COVID-19: [email or web address]    CDC: What to Do If You're Sick: www.cdc.gov/coronavirus/2019-ncov/about/steps-when-sick.html    CDC: Ending Home Isolation: www.cdc.gov/coronavirus/2019-ncov/hcp/disposition-in-home-patients.html     CDC: Caring for Someone: www.cdc.gov/coronavirus/2019-ncov/if-you-are-sick/care-for-someone.html     [facility]: Interim Guidance for Hospital Discharge to Home: www.[facility]./diseases/coronavirus/hcp/hospdischarge.pdf    [facility] clinical trials (COVID-19 research studies): clinicalaffairs.[facility]-clinical-trials     Below are the COVID-19 hotlines at the [place] [facility] ([facility]). Interpreters are available.   o For health questions: Call 408-694-9674 or 1-860.491.6334 (7 a.m. to 7 p.m.)  o For questions about schools and childcare: Call 704-705-6951 or 1-798.126.3398 (7 a.m. to 7 p.m.)

## 2021-06-18 NOTE — PROCEDURES
INTERVENTIONAL RADIOLOGY    Procedure:  GJ change    Meds:  None  MD:  Monserrat  Complications:  None  Contrast:  15 cc    Findings:    New 22 Fr 45 cm GJ tube placed, tip in proximal jejunum.    Plan:  OK to use GJ tube

## 2021-06-20 NOTE — PROCEDURES
INTERVENTIONAL RADIOLOGY    Procedure:  GJ change    Meds:  None    MD:  Monserrat    Complications:  None    Contrast:  20 cc    FT:  0.5 min    Findings:    New 22 Fr 45 cm GJ tube placed, tip in proximal jejunum.    Plan:  OK to use GJ tube

## 2021-07-03 NOTE — ADDENDUM NOTE
Addendum Note by Devon Paul MD at 10/7/2019  9:40 AM     Author: Devon Paul MD Service: -- Author Type: Physician    Filed: 10/9/2019  9:32 AM Encounter Date: 10/7/2019 Status: Signed    : Devon Paul MD (Physician)    Addended by: DEVON PAUL on: 10/9/2019 09:32 AM        Modules accepted: Orders

## 2021-07-03 NOTE — ADDENDUM NOTE
Addendum Note by German Watkins DPM at 8/27/2019  2:20 PM     Author: German Watkins DPM Service: -- Author Type: Physician    Filed: 8/28/2019  1:26 PM Encounter Date: 8/27/2019 Status: Signed    : German Watkins DPM (Physician)    Addended by: GERMAN WATKINS on: 8/28/2019 01:26 PM        Modules accepted: Orders

## 2021-07-21 DIAGNOSIS — G40.319 IDIOPATHIC GENERALIZED EPILEPSY, INTRACTABLE (H): Primary | ICD-10-CM

## 2021-07-21 RX ORDER — DIAZEPAM 5 MG/5ML
SOLUTION ORAL
Qty: 60 ML | Refills: 1 | Status: SHIPPED | OUTPATIENT
Start: 2021-07-21 | End: 2022-08-15

## 2021-07-21 NOTE — TELEPHONE ENCOUNTER
Refill request for diazepam  Medication T'd for review and signature  Agatha Lawler CMA on 7/21/2021 at 3:18 PM

## 2021-08-09 DIAGNOSIS — Z11.59 ENCOUNTER FOR SCREENING FOR OTHER VIRAL DISEASES: ICD-10-CM

## 2021-08-13 DIAGNOSIS — Z11.59 ENCOUNTER FOR SCREENING FOR OTHER VIRAL DISEASES: ICD-10-CM

## 2021-08-13 LAB — SARS-COV-2 RNA RESP QL NAA+PROBE: NEGATIVE

## 2021-08-13 PROCEDURE — U0005 INFEC AGEN DETEC AMPLI PROBE: HCPCS

## 2021-08-13 PROCEDURE — U0003 INFECTIOUS AGENT DETECTION BY NUCLEIC ACID (DNA OR RNA); SEVERE ACUTE RESPIRATORY SYNDROME CORONAVIRUS 2 (SARS-COV-2) (CORONAVIRUS DISEASE [COVID-19]), AMPLIFIED PROBE TECHNIQUE, MAKING USE OF HIGH THROUGHPUT TECHNOLOGIES AS DESCRIBED BY CMS-2020-01-R: HCPCS

## 2021-08-16 ENCOUNTER — HOSPITAL ENCOUNTER (OUTPATIENT)
Dept: INTERVENTIONAL RADIOLOGY/VASCULAR | Facility: HOSPITAL | Age: 56
Discharge: HOME | End: 2021-08-16
Attending: NURSE PRACTITIONER | Admitting: RADIOLOGY
Payer: MEDICARE

## 2021-08-16 DIAGNOSIS — R13.10 DYSPHAGIA, UNSPECIFIED TYPE: ICD-10-CM

## 2021-08-16 DIAGNOSIS — R63.30 FEEDING DIFFICULTIES: Primary | ICD-10-CM

## 2021-08-16 PROCEDURE — C1769 GUIDE WIRE: HCPCS

## 2021-08-16 PROCEDURE — 272N000583 ZZ HC TUBE GASTRO CR12

## 2021-08-16 PROCEDURE — 255N000002 HC RX 255 OP 636: Performed by: RADIOLOGY

## 2021-08-16 PROCEDURE — 49452 REPLACE G-J TUBE PERC: CPT

## 2021-08-16 RX ADMIN — IOHEXOL 10 ML: 350 INJECTION, SOLUTION INTRAVENOUS at 14:10

## 2021-10-26 ENCOUNTER — LAB REQUISITION (OUTPATIENT)
Dept: LAB | Facility: HOSPITAL | Age: 56
End: 2021-10-26
Payer: MEDICARE

## 2021-10-26 DIAGNOSIS — D64.9 ANEMIA, UNSPECIFIED: ICD-10-CM

## 2021-10-26 LAB
IRON SATN MFR SERPL: 21 % (ref 15–46)
IRON SERPL-MCNC: 66 UG/DL (ref 35–180)
TIBC SERPL-MCNC: 317 UG/DL (ref 240–430)

## 2021-10-26 PROCEDURE — 36415 COLL VENOUS BLD VENIPUNCTURE: CPT | Mod: ORL | Performed by: FAMILY MEDICINE

## 2021-10-26 PROCEDURE — 83550 IRON BINDING TEST: CPT | Mod: ORL | Performed by: FAMILY MEDICINE

## 2021-10-29 ENCOUNTER — LAB REQUISITION (OUTPATIENT)
Dept: LAB | Facility: CLINIC | Age: 56
End: 2021-10-29
Payer: MEDICARE

## 2021-10-29 DIAGNOSIS — G40.909 EPILEPSY, UNSPECIFIED, NOT INTRACTABLE, WITHOUT STATUS EPILEPTICUS (H): ICD-10-CM

## 2021-10-29 DIAGNOSIS — E03.9 HYPOTHYROIDISM, UNSPECIFIED: ICD-10-CM

## 2021-10-29 DIAGNOSIS — G80.8 OTHER CEREBRAL PALSY (H): ICD-10-CM

## 2021-11-03 ENCOUNTER — LAB REQUISITION (OUTPATIENT)
Dept: LAB | Facility: HOSPITAL | Age: 56
End: 2021-11-03
Payer: MEDICARE

## 2021-11-03 LAB
ALBUMIN SERPL-MCNC: 3.9 G/DL (ref 3.5–5)
ALP SERPL-CCNC: 245 U/L (ref 45–120)
ALT SERPL W P-5'-P-CCNC: 21 U/L (ref 0–45)
ANION GAP SERPL CALCULATED.3IONS-SCNC: 7 MMOL/L (ref 5–18)
AST SERPL W P-5'-P-CCNC: 30 U/L (ref 0–40)
BILIRUB SERPL-MCNC: 0.4 MG/DL (ref 0–1)
BUN SERPL-MCNC: 17 MG/DL (ref 8–22)
CALCIUM SERPL-MCNC: 9.9 MG/DL (ref 8.5–10.5)
CHLORIDE BLD-SCNC: 108 MMOL/L (ref 98–107)
CO2 SERPL-SCNC: 27 MMOL/L (ref 22–31)
CREAT SERPL-MCNC: 0.53 MG/DL (ref 0.6–1.1)
ERYTHROCYTE [DISTWIDTH] IN BLOOD BY AUTOMATED COUNT: 13 % (ref 10–15)
GFR SERPL CREATININE-BSD FRML MDRD: >90 ML/MIN/1.73M2
GLUCOSE BLD-MCNC: 93 MG/DL (ref 70–125)
HCT VFR BLD AUTO: 47.4 % (ref 35–47)
HGB BLD-MCNC: 15 G/DL (ref 11.7–15.7)
MCH RBC QN AUTO: 31 PG (ref 26.5–33)
MCHC RBC AUTO-ENTMCNC: 31.6 G/DL (ref 31.5–36.5)
MCV RBC AUTO: 98 FL (ref 78–100)
PLATELET # BLD AUTO: 268 10E3/UL (ref 150–450)
POTASSIUM BLD-SCNC: 3.7 MMOL/L (ref 3.5–5)
PROT SERPL-MCNC: 7.6 G/DL (ref 6–8)
RBC # BLD AUTO: 4.84 10E6/UL (ref 3.8–5.2)
SODIUM SERPL-SCNC: 142 MMOL/L (ref 136–145)
TSH SERPL DL<=0.005 MIU/L-ACNC: 1.69 UIU/ML (ref 0.3–5)
WBC # BLD AUTO: 6.4 10E3/UL (ref 4–11)

## 2021-11-03 PROCEDURE — 80053 COMPREHEN METABOLIC PANEL: CPT | Mod: ORL | Performed by: FAMILY MEDICINE

## 2021-11-03 PROCEDURE — 80074 ACUTE HEPATITIS PANEL: CPT | Mod: ORL | Performed by: FAMILY MEDICINE

## 2021-11-03 PROCEDURE — 84443 ASSAY THYROID STIM HORMONE: CPT | Mod: ORL | Performed by: FAMILY MEDICINE

## 2021-11-03 PROCEDURE — 85027 COMPLETE CBC AUTOMATED: CPT | Mod: ORL | Performed by: FAMILY MEDICINE

## 2021-11-03 PROCEDURE — 36415 COLL VENOUS BLD VENIPUNCTURE: CPT | Mod: ORL | Performed by: FAMILY MEDICINE

## 2021-11-05 LAB
HAV IGM SERPL QL IA: NEGATIVE
HBV CORE IGM SERPL QL IA: NEGATIVE
HBV SURFACE AG SERPL QL IA: NONREACTIVE
HCV AB SERPL QL IA: NEGATIVE

## 2021-11-10 DIAGNOSIS — Z11.59 ENCOUNTER FOR SCREENING FOR OTHER VIRAL DISEASES: ICD-10-CM

## 2021-11-15 ENCOUNTER — LAB (OUTPATIENT)
Dept: FAMILY MEDICINE | Facility: CLINIC | Age: 56
End: 2021-11-15
Attending: NURSE PRACTITIONER
Payer: MEDICARE

## 2021-11-15 DIAGNOSIS — Z11.59 ENCOUNTER FOR SCREENING FOR OTHER VIRAL DISEASES: ICD-10-CM

## 2021-11-15 PROCEDURE — U0005 INFEC AGEN DETEC AMPLI PROBE: HCPCS

## 2021-11-15 PROCEDURE — U0003 INFECTIOUS AGENT DETECTION BY NUCLEIC ACID (DNA OR RNA); SEVERE ACUTE RESPIRATORY SYNDROME CORONAVIRUS 2 (SARS-COV-2) (CORONAVIRUS DISEASE [COVID-19]), AMPLIFIED PROBE TECHNIQUE, MAKING USE OF HIGH THROUGHPUT TECHNOLOGIES AS DESCRIBED BY CMS-2020-01-R: HCPCS

## 2021-11-16 LAB — SARS-COV-2 RNA RESP QL NAA+PROBE: NEGATIVE

## 2021-11-19 ENCOUNTER — HOSPITAL ENCOUNTER (OUTPATIENT)
Dept: INTERVENTIONAL RADIOLOGY/VASCULAR | Facility: HOSPITAL | Age: 56
Discharge: HOME | End: 2021-11-19
Attending: NURSE PRACTITIONER | Admitting: RADIOLOGY
Payer: MEDICARE

## 2021-11-19 DIAGNOSIS — R63.30 FEEDING DIFFICULTIES: ICD-10-CM

## 2021-11-19 PROCEDURE — C1769 GUIDE WIRE: HCPCS

## 2021-11-19 PROCEDURE — 272N000583 ZZ HC TUBE GASTRO CR12

## 2021-11-19 PROCEDURE — 49452 REPLACE G-J TUBE PERC: CPT

## 2021-11-19 PROCEDURE — 255N000002 HC RX 255 OP 636: Performed by: NURSE PRACTITIONER

## 2021-11-19 RX ADMIN — IOHEXOL 20 ML: 350 INJECTION, SOLUTION INTRAVENOUS at 12:18

## 2021-11-19 NOTE — PROGRESS NOTES
Pt discharge to home accompanied by group home staff.  Routine G/J tube exchange w/o complications.  Home paperwork signed and returned to staff.

## 2022-02-03 DIAGNOSIS — Z11.59 ENCOUNTER FOR SCREENING FOR OTHER VIRAL DISEASES: Primary | ICD-10-CM

## 2022-02-09 ENCOUNTER — LAB REQUISITION (OUTPATIENT)
Dept: LAB | Facility: HOSPITAL | Age: 57
End: 2022-02-09
Payer: MEDICARE

## 2022-02-09 DIAGNOSIS — R56.9 UNSPECIFIED CONVULSIONS (H): ICD-10-CM

## 2022-02-09 DIAGNOSIS — D64.9 ANEMIA, UNSPECIFIED: ICD-10-CM

## 2022-02-09 LAB
ALBUMIN SERPL-MCNC: 3.6 G/DL (ref 3.5–5)
ALBUMIN SERPL-MCNC: 3.7 G/DL (ref 3.5–5)
ALBUMIN SERPL-MCNC: 3.7 G/DL (ref 3.5–5)
ALP SERPL-CCNC: 254 U/L (ref 45–120)
ALT SERPL W P-5'-P-CCNC: 18 U/L (ref 0–45)
AMMONIA PLAS-SCNC: 47 UMOL/L (ref 11–35)
ANION GAP SERPL CALCULATED.3IONS-SCNC: 9 MMOL/L (ref 5–18)
AST SERPL W P-5'-P-CCNC: 32 U/L (ref 0–40)
BILIRUB DIRECT SERPL-MCNC: 0.1 MG/DL
BILIRUB SERPL-MCNC: 0.2 MG/DL (ref 0–1)
BUN SERPL-MCNC: 19 MG/DL (ref 8–22)
CALCIUM SERPL-MCNC: 9.6 MG/DL (ref 8.5–10.5)
CHLORIDE BLD-SCNC: 105 MMOL/L (ref 98–107)
CHOLEST SERPL-MCNC: 177 MG/DL
CO2 SERPL-SCNC: 25 MMOL/L (ref 22–31)
CREAT SERPL-MCNC: 0.51 MG/DL (ref 0.6–1.1)
ERYTHROCYTE [DISTWIDTH] IN BLOOD BY AUTOMATED COUNT: 13 % (ref 10–15)
FASTING STATUS PATIENT QL REPORTED: NO
GFR SERPL CREATININE-BSD FRML MDRD: >90 ML/MIN/1.73M2
GLUCOSE BLD-MCNC: 108 MG/DL (ref 70–125)
HCT VFR BLD AUTO: 45 % (ref 35–47)
HDLC SERPL-MCNC: 37 MG/DL
HGB BLD-MCNC: 14.2 G/DL (ref 11.7–15.7)
LDLC SERPL CALC-MCNC: 119 MG/DL
MCH RBC QN AUTO: 31.2 PG (ref 26.5–33)
MCHC RBC AUTO-ENTMCNC: 31.6 G/DL (ref 31.5–36.5)
MCV RBC AUTO: 99 FL (ref 78–100)
PHOSPHATE SERPL-MCNC: 3.3 MG/DL (ref 2.5–4.5)
PLATELET # BLD AUTO: 257 10E3/UL (ref 150–450)
POTASSIUM BLD-SCNC: 4.1 MMOL/L (ref 3.5–5)
PROT SERPL-MCNC: 7.3 G/DL (ref 6–8)
RBC # BLD AUTO: 4.55 10E6/UL (ref 3.8–5.2)
SODIUM SERPL-SCNC: 139 MMOL/L (ref 136–145)
TRIGL SERPL-MCNC: 104 MG/DL
WBC # BLD AUTO: 6.7 10E3/UL (ref 4–11)

## 2022-02-09 PROCEDURE — 80061 LIPID PANEL: CPT | Mod: ORL | Performed by: FAMILY MEDICINE

## 2022-02-09 PROCEDURE — 84100 ASSAY OF PHOSPHORUS: CPT | Mod: ORL | Performed by: FAMILY MEDICINE

## 2022-02-09 PROCEDURE — 85027 COMPLETE CBC AUTOMATED: CPT | Mod: ORL | Performed by: FAMILY MEDICINE

## 2022-02-09 PROCEDURE — 36415 COLL VENOUS BLD VENIPUNCTURE: CPT | Mod: ORL | Performed by: FAMILY MEDICINE

## 2022-02-09 PROCEDURE — 82040 ASSAY OF SERUM ALBUMIN: CPT | Mod: ORL | Performed by: FAMILY MEDICINE

## 2022-02-09 PROCEDURE — 82248 BILIRUBIN DIRECT: CPT | Mod: ORL | Performed by: FAMILY MEDICINE

## 2022-02-09 PROCEDURE — 82140 ASSAY OF AMMONIA: CPT | Mod: ORL | Performed by: FAMILY MEDICINE

## 2022-02-16 NOTE — TELECONSULT
IR Procedure Pre-call    Spoke with: Care facility House supervisor  Arrival and procedure time: 1030am  Patient has : Yes  Patient has someone to stay overnight:No If angiogram must have responsible adult for 24 hours.  Patient is taking blood thinners:No  Patient has H&P within 30 days:No   Patient has covid test:Yes 2/18/22  Patient NPO 8 hours prior: Yes    Pre-call completed.   February 16, 2022 2:38 PM  Reyna Marrero RN

## 2022-02-18 ENCOUNTER — LAB (OUTPATIENT)
Dept: FAMILY MEDICINE | Facility: CLINIC | Age: 57
End: 2022-02-18
Attending: NURSE PRACTITIONER
Payer: MEDICARE

## 2022-02-18 DIAGNOSIS — Z11.59 ENCOUNTER FOR SCREENING FOR OTHER VIRAL DISEASES: ICD-10-CM

## 2022-02-18 LAB — SARS-COV-2 RNA RESP QL NAA+PROBE: NEGATIVE

## 2022-02-18 PROCEDURE — U0003 INFECTIOUS AGENT DETECTION BY NUCLEIC ACID (DNA OR RNA); SEVERE ACUTE RESPIRATORY SYNDROME CORONAVIRUS 2 (SARS-COV-2) (CORONAVIRUS DISEASE [COVID-19]), AMPLIFIED PROBE TECHNIQUE, MAKING USE OF HIGH THROUGHPUT TECHNOLOGIES AS DESCRIBED BY CMS-2020-01-R: HCPCS

## 2022-02-18 PROCEDURE — U0005 INFEC AGEN DETEC AMPLI PROBE: HCPCS

## 2022-02-22 ENCOUNTER — HOSPITAL ENCOUNTER (OUTPATIENT)
Dept: INTERVENTIONAL RADIOLOGY/VASCULAR | Facility: HOSPITAL | Age: 57
Discharge: HOME | End: 2022-02-22
Attending: NURSE PRACTITIONER | Admitting: RADIOLOGY
Payer: MEDICARE

## 2022-02-22 DIAGNOSIS — R63.30 FEEDING DIFFICULTIES: ICD-10-CM

## 2022-02-22 PROCEDURE — 272N000583 ZZ HC TUBE GASTRO CR12

## 2022-02-22 PROCEDURE — 49452 REPLACE G-J TUBE PERC: CPT

## 2022-02-22 PROCEDURE — C1769 GUIDE WIRE: HCPCS

## 2022-02-22 PROCEDURE — 255N000002 HC RX 255 OP 636: Performed by: NURSE PRACTITIONER

## 2022-02-22 RX ADMIN — IOHEXOL 15 ML: 350 INJECTION, SOLUTION INTRAVENOUS at 11:08

## 2022-03-03 ENCOUNTER — LAB REQUISITION (OUTPATIENT)
Dept: LAB | Facility: HOSPITAL | Age: 57
End: 2022-03-03
Payer: MEDICARE

## 2022-03-03 DIAGNOSIS — G80.8 OTHER CEREBRAL PALSY (H): ICD-10-CM

## 2022-03-03 DIAGNOSIS — G40.909 EPILEPSY, UNSPECIFIED, NOT INTRACTABLE, WITHOUT STATUS EPILEPTICUS (H): ICD-10-CM

## 2022-03-03 DIAGNOSIS — K21.00 GASTRO-ESOPHAGEAL REFLUX DISEASE WITH ESOPHAGITIS, WITHOUT BLEEDING: ICD-10-CM

## 2022-03-03 PROCEDURE — 36415 COLL VENOUS BLD VENIPUNCTURE: CPT | Mod: ORL | Performed by: PSYCHIATRY & NEUROLOGY

## 2022-03-03 PROCEDURE — 80175 DRUG SCREEN QUAN LAMOTRIGINE: CPT | Mod: ORL | Performed by: PSYCHIATRY & NEUROLOGY

## 2022-03-05 LAB — LAMOTRIGINE SERPL-MCNC: 9.3 UG/ML

## 2022-04-13 DIAGNOSIS — G40.319 IDIOPATHIC GENERALIZED EPILEPSY, INTRACTABLE (H): ICD-10-CM

## 2022-04-13 RX ORDER — TOPIRAMATE 200 MG/1
TABLET, FILM COATED ORAL
Qty: 60 TABLET | Refills: 0 | Status: SHIPPED | OUTPATIENT
Start: 2022-04-13 | End: 2022-04-27

## 2022-04-13 RX ORDER — LEVETIRACETAM 100 MG/ML
SOLUTION ORAL
Qty: 600 ML | Refills: 0 | Status: SHIPPED | OUTPATIENT
Start: 2022-04-13 | End: 2022-04-27

## 2022-04-13 NOTE — TELEPHONE ENCOUNTER
Refill request for Keppra and Topamax. Pt last seen 4/15/21 and has follow up scheduled for 4/27/22. Will send 1 refill.     Adela Bennett RN on 4/13/2022 at 9:35 AM

## 2022-04-27 ENCOUNTER — OFFICE VISIT (OUTPATIENT)
Dept: NEUROLOGY | Facility: CLINIC | Age: 57
End: 2022-04-27
Payer: MEDICARE

## 2022-04-27 VITALS
SYSTOLIC BLOOD PRESSURE: 98 MMHG | BODY MASS INDEX: 21.37 KG/M2 | WEIGHT: 106 LBS | HEIGHT: 59 IN | HEART RATE: 66 BPM | DIASTOLIC BLOOD PRESSURE: 67 MMHG

## 2022-04-27 DIAGNOSIS — G40.319 IDIOPATHIC GENERALIZED EPILEPSY, INTRACTABLE (H): Primary | ICD-10-CM

## 2022-04-27 PROBLEM — Z89.421: Status: ACTIVE | Noted: 2022-04-27

## 2022-04-27 PROCEDURE — 99214 OFFICE O/P EST MOD 30 MIN: CPT | Performed by: PSYCHIATRY & NEUROLOGY

## 2022-04-27 RX ORDER — LAMOTRIGINE 25 MG/1
75 TABLET ORAL 2 TIMES DAILY
Qty: 180 TABLET | Refills: 11 | Status: SHIPPED | OUTPATIENT
Start: 2022-04-27 | End: 2023-05-09

## 2022-04-27 RX ORDER — TOPIRAMATE 200 MG/1
200 TABLET, FILM COATED ORAL 2 TIMES DAILY
Qty: 60 TABLET | Refills: 11 | Status: SHIPPED | OUTPATIENT
Start: 2022-04-27 | End: 2023-05-09

## 2022-04-27 RX ORDER — LAMOTRIGINE 200 MG/1
TABLET ORAL
Qty: 62 TABLET | Refills: 11 | Status: SHIPPED | OUTPATIENT
Start: 2022-04-27 | End: 2023-05-09

## 2022-04-27 RX ORDER — LEVETIRACETAM 100 MG/ML
SOLUTION ORAL
Qty: 600 ML | Refills: 0 | Status: SHIPPED | OUTPATIENT
Start: 2022-04-27 | End: 2022-09-06

## 2022-04-27 NOTE — LETTER
2022         RE: Elysia Arellano  [address] [facility] [address]        Dear Colleague,    Thank you for referring your patient, Elysia Arellano, to the [facility] [place]. Please see a copy of my visit note below.    NEUROLOGY FOLLOW UP VISIT  NOTE       [facility] [place]  [address] [place] [address]  Tel: [phone]  Fax: [phone]  [email or web address]     Elysia Arellano,  1965, MRN [id number]  PCP: Eduardo Clement  Date: 2022      ASSESSMENT & PLAN     Visit Diagnosis  1. Idiopathic generalized epilepsy, intractable (H)     Generalized intractable epilepsy  56-year-old female with mental retardation, cerebral palsy, intractable epilepsy is a resident of a group home who returns for yearly follow-up.  She has remained seizure-free.  I have recommended to continue current dose of Keppra, Lamictal and Topamax.  I am checking anticonvulsant level (recent Lamictal level was therapeutic), comprehensive metabolic panel.  Follow-up will be in 1 year    Thank you again for this referral, please feel free to contact me if you have any questions.    Lex Forman MD  [facility]  (Formerly, [facility]     HISTORY OF PRESENT ILLNESS     Patient is a 56-year-old female with mental retardation, cerebral palsy, intractable epilepsy, resident of a group home who returns for yearly follow-up for her seizure disorder.  She was last seen on 4/15/2021 when she had remained seizure-free.  She was continued on Keppra, lamotrigine and Topamax and I had recommended checking blood levels that were not done.  Recently lamotrigine level was done that was within normal limits but Topamax and Keppra level were done but a year ago and was therapeutic.  According to caregiver there has been no significant change.  She has not experienced any seizures.  She is wheelchair-bound  and minimally communicative     PROBLEM LIST   Patient Active Problem List   Diagnosis Code     Scoliosis M41.9     Intellectual disability F79     Cerebral palsy (H) G80.9     Idiopathic generalized epilepsy, intractable (H) G40.319     Gastroesophageal reflux disease without esophagitis K21.9     History of Nissen fundoplication Z98.890     Osteoporosis M81.0     Absence of toe of right foot (H) Z89.421     Gastrostomy present (H) Z93.1         PAST MEDICAL & SURGICAL HISTORY     Past Medical History:   Patient  has a past medical history of Cerebral palsy (H), Depression, GERD (gastroesophageal reflux disease), Hyperopia, Mental retardation, Osteoporosis, Pneumonia due to 2019 novel coronavirus (8/7/2020), Pyelonephritis, Scoliosis, Seizure disorder (H), and UTI (urinary tract infection).    Surgical History:  She  has a past surgical history that includes IR Gastro Jejunostomy Tube Placement; Scoliosis S/P Lewis Valentín Placement; IR Miscellaneous Procedure (6/19/2000); IR Abscess Tube Change (6/27/2000); IR Abscess Tube Change (9/5/2000); IR Abscess Tube Change (3/15/2001); IR Abscess Tube Change (7/6/2001); IR Abscess Tube Change (9/4/2001); IR Miscellaneous Procedure (12/22/2001); IR Miscellaneous Procedure (1/7/2002); IR Miscellaneous Procedure (1/19/2002); IR Miscellaneous Procedure (1/19/2002); IR Miscellaneous Procedure (2/20/2002); IR Miscellaneous Procedure (2/20/2002); IR Miscellaneous Procedure (3/19/2002); IR Miscellaneous Procedure (4/16/2002); IR Miscellaneous Procedure (7/11/2002); IR Miscellaneous Procedure (7/11/2002); IR Gastrostomy Tube Change (7/18/2002); IR Miscellaneous Procedure (7/30/2002); IR Miscellaneous Procedure (8/16/2002); IR Miscellaneous Procedure (8/31/2002); IR Miscellaneous Procedure (9/18/2002); IR Miscellaneous Procedure (3/1/2003); IR Miscellaneous Procedure (8/5/2003); IR Miscellaneous Procedure (1/29/2004); IR Miscellaneous Procedure (3/18/2004); IR Miscellaneous  Procedure (7/21/2004); IR Miscellaneous Procedure (11/15/2004); IR Miscellaneous Procedure (2/18/2005); IR Miscellaneous Procedure (4/8/2005); IR Miscellaneous Procedure (6/30/2005); IR Miscellaneous Procedure (9/30/2005); IR Miscellaneous Procedure (12/20/2005); IR Miscellaneous Procedure (3/28/2006); IR Miscellaneous Procedure (6/30/2006); IR Miscellaneous Procedure (10/30/2006); IR Miscellaneous Procedure (2/28/2007); IR Miscellaneous Procedure (6/19/2007); IR Miscellaneous Procedure (9/25/2007); IR Miscellaneous Procedure (12/10/2007); IR Miscellaneous Procedure (1/5/2008); IR Miscellaneous Procedure (6/18/2008); IR Miscellaneous Procedure (8/25/2008); IR Miscellaneous Procedure (12/18/2008); IR Miscellaneous Procedure (2/18/2009); IR Miscellaneous Procedure (8/4/2009); IR Gastro Jejunostomy Tube Change (8/30/2009); IR Miscellaneous Procedure (11/6/2009); IR Miscellaneous Procedure (12/28/2009); IR Miscellaneous Procedure (4/13/2010); IR Miscellaneous Procedure (6/29/2010); IR Miscellaneous Procedure (10/11/2010); IR Miscellaneous Procedure (1/3/2011); IR Miscellaneous Procedure (2/25/2011); IR Miscellaneous Procedure (5/26/2011); IR Miscellaneous Procedure (8/23/2011); IR Miscellaneous Procedure (11/17/2011); IR Miscellaneous Procedure (2/15/2012); IR Miscellaneous Procedure (5/18/2012); IR Miscellaneous Procedure (8/16/2012); IR Miscellaneous Procedure (10/25/2012); IR Miscellaneous Procedure (1/14/2013); IR Miscellaneous Procedure (4/19/2013); IR Miscellaneous Procedure (7/24/2013); IR Miscellaneous Procedure (10/24/2013); IR Miscellaneous Procedure (12/23/2013); IR Miscellaneous Procedure (4/3/2014); IR Miscellaneous Procedure (5/20/2014); IR Gastro Jejunostomy Tube Change (8/5/2014); IR Gastro Jejunostomy Tube Change (11/5/2014); IR Gastro Jejunostomy Tube Change (2/18/2015); IR Gastro Jejunostomy Tube Change (5/18/2015); IR Gastro Jejunostomy Tube Change (8/17/2015); IR Gastro Jejunostomy Tube Change  (10/28/2015); IR Gastro Jejunostomy Tube Change (1/16/2016); IR Gastro Jejunostomy Tube Change (4/14/2016); IR Gastro Jejunostomy Tube Change (5/13/2016); IR Gastro Jejunostomy Tube Change (6/16/2016); IR Gastro Jejunostomy Tube Change (7/20/2016); IR Gastro Jejunostomy Tube Change (9/7/2016); IR Gastro Jejunostomy Tube Change (10/24/2016); IR Gastro Jejunostomy Tube Change (1/8/2017); IR Gastro Jejunostomy Tube Change (3/19/2017); IR Gastro Jejunostomy Tube Change (4/25/2017); IR Gastro Jejunostomy Tube Change (6/10/2017); IR Gastro Jejunostomy Tube Change (9/11/2017); IR Gastro Jejunostomy Tube Change (12/11/2017); IR Gastro Jejunostomy Tube Change (2/12/2018); IR Gastro Jejunostomy Tube Change (5/16/2018); IR Gastro Jejunostomy Tube Change (8/27/2018); IR Gastro Jejunostomy Tube Change (11/13/2018); IR Gastro Jejunostomy Tube Change (1/8/2019); IR Gastro Jejunostomy Tube Change (2/6/2019); IR Gastro Jejunostomy Tube Change (4/29/2019); IR Gastro Jejunostomy Tube Change (6/25/2019); IR Gastro Jejunostomy Tube Change (9/24/2019); IR Gastro Jejunostomy Tube Change (1/29/2020); IR Gastro Jejunostomy Tube Change (5/6/2020); IR Gastro Jejunostomy Tube Change (8/3/2020); IR Gastro Jejunostomy Tube Change (11/10/2020); IR Gastro Jejunostomy Tube Change (2/11/2021); IR Gastro Jejunostomy Tube Change (5/17/2021); Spinal Fusion; Ir Gj Tube Replacement (11/13/2018); Ir Gj Tube Replacement (1/8/2019); Ir Gj Tube Replacement (2/6/2019); Ir Gj Tube Replacement (4/29/2019); Ir Gj Tube Replacement (6/25/2019); Ir Gj Tube Replacement (9/24/2019); back surgery; Amputate toe(s) (Right, 11/21/2019); Ir Gj Tube Replacement (1/29/2020); Ir Gj Tube Replacement (5/6/2020); Ir Gj Tube Replacement (8/3/2020); Ir Gj Tube Replacement (11/10/2020); Ir Gj Tube Replacement (2/11/2021); Ir Gj Tube Replacement (5/17/2021); IR Gastro Jejunostomy Tube Change (8/16/2021); IR Gastro Jejunostomy Tube Change (11/19/2021); and IR Gastro Jejunostomy Tube  Change (2/22/2022).     SOCIAL HISTORY     Reviewed, and she  reports that she has never smoked. She has never used smokeless tobacco. She reports that she does not drink alcohol and does not use drugs.     FAMILY HISTORY     Reviewed, and family history includes Seizure Disorder in her sister.     ALLERGIES     Allergies   Allergen Reactions     Ciprofloxacin Rash     Other reaction(s): *Unknown     Buffered Aspirin      Other reaction(s): Unknown     Lanolin      Other reaction(s): *Unknown     Nsaids      Other reaction(s): Unknown     Augmentin Rash     Ibuprofen Rash     Other reaction(s): *Unknown     Salicylates Rash     Other reaction(s): Unknown         REVIEW OF SYSTEMS     A 12 point review of system was performed and was negative except as outlined in the history of present illness.     HOME MEDICATIONS     Current Outpatient Rx   Medication Sig Dispense Refill     bisacodyl (DULCOLAX) 10 MG suppository Place 10 mg rectally every other day       calcium carbonate 1250 MG/5ML SUSP suspension Take 600 mg by mouth daily Per G-tube       diazepam (VALIUM) 5 MG/ML (HIGH CONC) solution Take 5 mg by mouth every 8 hours as needed for anxiety       diazePAM 5 MG/5ML SOLN 5ML (5MG) PER G-TUBE AS NEEDED FOR SEIZURE MAY REPEAT ONCE IN 15 MINUTE MAX  2 DOSES IN 24 HOURS. *SPLIT* 60 mL 1     diphenhydrAMINE (BENADRYL) 12.5 MG/5ML solution Take 25 mg by mouth every 4 hours as needed for sleep        folic acid (FOLVITE) 1 MG tablet Take 1 mg by mouth daily Per G- tube       hydrocortisone (CORTAID) 1 % external cream Apply topically 2 times daily as needed To affected area of abdomen       lamoTRIgine (LAMICTAL) 200 MG tablet TAKE 1 TABLET PER G-TUBE TWICE DAILY 62 tablet 11     lamoTRIgine (LAMICTAL) 25 MG tablet Take 3 tablets (75 mg) by mouth 2 times daily Per G-tube 180 tablet 11     levETIRAcetam (KEPPRA) 100 MG/ML solution GIVE 10ML (1000MG) PER G-TUBE TWICE DAILY 600 mL 0     levothyroxine (SYNTHROID/LEVOTHROID)  50 MCG tablet Take 50 mcg by mouth daily Per G-tube       multivitamin w/minerals (THERA-VIT-M) tablet Take 1 tablet by mouth daily Per G-tube       topiramate (TOPAMAX) 200 MG tablet 1 tablet (200 mg) by Per G Tube route 2 times daily 60 tablet 11     VITAMIN D (CHOLECALCIFEROL) PO Take 400 Units by mouth daily 2.5ml qd       acetaminophen (TYLENOL) 325 MG tablet Take 650 mg by mouth every 4 hours as needed for mild pain As needed for pain, discomfort, and fever.  Not to exceed 3000 mg in 24 hours.       arginine (ARGINAID) PACK Take 1 packet by mouth 2 times daily Per G-tube mixed with 4 ounces of water       carboxymethylcellulose PF (REFRESH PLUS) 0.5 % SOLN ophthalmic solution 1 drop At Bedtime       clonazePAM (KLONOPIN) 0.5 MG tablet Take 0.25 mg by mouth At Bedtime Per G-tube (Patient not taking: Reported on 4/27/2022)       desonide (DESOWEN) 0.05 % external cream Apply topically 2 times daily .  Hold if no red, itchy, scaly areas on face.       Dextromethorphan-guaiFENesin  MG/5ML syrup Take 10 mLs by mouth every 6 hours as needed for cough       nitroFURantoin (FURADANTIN) 25 MG/5ML suspension Take 100 mg by mouth 4 times daily Per G-tube       nystatin (MYCOSTATIN) 393253 UNIT/GM external cream Apply topically 3 times daily as needed for dry skin To groin, and two times daily to G-tube site       polyethylene glycol (MIRALAX/GLYCOLAX) packet Take 1 packet by mouth 2 times daily as needed for constipation Per G-tube, mix with 150 ml of water       protein modular, BENEPROTEIN, PACK 7 g by Per Feeding Tube route 2 times daily       Psyllium (GENFIBER PO) Take 2 Tablespoonful by mouth daily Per G-tube with 4 ounces of water flush       rectal diazepam KIT 1 kit once as needed for medication administration 10 mg rectally once for seizure lasting over 5 minutes.       Sodium Phosphates (FLEET ENEMA RE) Place rectally every other day If no results from suppository           PHYSICAL EXAM     Vital  "signs  BP 98/67 (BP Location: Left arm, Patient Position: Sitting)   Pulse 66   Ht 1.499 m (4' 11\")   Wt 48.1 kg (106 lb)   BMI 21.41 kg/m      Weight:   106 lbs 0 oz    General physical exam reveals a middle-aged female who is sitting in a chair alert and oriented ×0 noncommunicative. She occasionally tracks. Vital signs were reviewed and are documented in electronic medical record. She withdraws all 4 extremities to painful stimuli reflexes 1+ she has increased tone. She is wheelchair-bound.     PERTINENT DIAGNOSTIC STUDIES     Following studies were reviewed:     CT HEAD 7/1/19  1. No acute intracranial abnormality. 2. Unchanged mild cerebral volume loss and severe cerebellar volume loss.     EEG 11/20/2019  This is an abnormal awake and drowsy EEG due to background   slowing, most suggestive of severe compromised of brain function, due to   encephalopathy or organic logan syndrome of non-specific etiology. No   seizure activities detected during study. Clinical correlation is   recommended.      PERTINENT LABS  Following labs were reviewed:  Lab Requisition on 03/03/2022   Component Date Value     Lamotrigine 03/03/2022 9.3    Lab on 02/18/2022   Component Date Value     SARS CoV2 PCR 02/18/2022 Negative    Lab Requisition on 02/09/2022   Component Date Value     Albumin 02/09/2022 3.7      Bilirubin Total 02/09/2022 0.2      Bilirubin Direct 02/09/2022 0.1      Protein Total 02/09/2022 7.3      Albumin 02/09/2022 3.7      Alkaline Phosphatase 02/09/2022 254 (A)     AST 02/09/2022 32      ALT 02/09/2022 18      Cholesterol 02/09/2022 177      Triglycerides 02/09/2022 104      Direct Measure HDL 02/09/2022 37 (A)     LDL Cholesterol Calculat* 02/09/2022 119      Patient Fasting > 8hrs? 02/09/2022 No      Ammonia 02/09/2022 47 (A)     Sodium 02/09/2022 139      Potassium 02/09/2022 4.1      Chloride 02/09/2022 105      Carbon Dioxide (CO2) 02/09/2022 25      Anion Gap 02/09/2022 9      Urea Nitrogen 02/09/2022 " 19      Creatinine 02/09/2022 0.51 (A)     Calcium 02/09/2022 9.6      Glucose 02/09/2022 108      Albumin 02/09/2022 3.6      Phosphorus 02/09/2022 3.3      GFR Estimate 02/09/2022 >90      WBC Count 02/09/2022 6.7      RBC Count 02/09/2022 4.55      Hemoglobin 02/09/2022 14.2      Hematocrit 02/09/2022 45.0      MCV 02/09/2022 99      MCH 02/09/2022 31.2      MCHC 02/09/2022 31.6      RDW 02/09/2022 13.0      Platelet Count 02/09/2022 257          Total time spent for face to face visit, reviewing labs/imaging studies, counseling and coordination of care was: 30 Minutes spent on the date of the encounter doing chart review, review of outside records, review of test results, interpretation of tests, patient visit and documentation       This note was dictated using voice recognition software.  Any grammatical or context distortions are unintentional and inherent to the software.    Orders Placed This Encounter   Procedures     Keppra (Levetiracetam) Level     Topiramate Level     Comprehensive metabolic panel      New Prescriptions    No medications on file     Modified Medications    Modified Medication Previous Medication    LAMOTRIGINE (LAMICTAL) 200 MG TABLET lamoTRIgine (LAMICTAL) 200 MG tablet       TAKE 1 TABLET PER G-TUBE TWICE DAILY    TAKE 1 TABLET PER G-TUBE TWICE DAILY *1 TOTAL FILL*    LAMOTRIGINE (LAMICTAL) 25 MG TABLET lamoTRIgine (LAMICTAL) 25 MG tablet       Take 3 tablets (75 mg) by mouth 2 times daily Per G-tube    Take 3 tablets (75 mg) by mouth 2 times daily Per G-tube    LEVETIRACETAM (KEPPRA) 100 MG/ML SOLUTION levETIRAcetam (KEPPRA) 100 MG/ML solution       GIVE 10ML (1000MG) PER G-TUBE TWICE DAILY    GIVE 10ML (1000MG) PER G-TUBE TWICE DAILY    TOPIRAMATE (TOPAMAX) 200 MG TABLET topiramate (TOPAMAX) 200 MG tablet       1 tablet (200 mg) by Per G Tube route 2 times daily    TAKE 1 TABLET PER G-TUBE TWICE DAILY                     Again, thank you for allowing me to participate in the care of  your patient.        Sincerely,        Lex Forman MD

## 2022-04-27 NOTE — NURSING NOTE
Chief Complaint   Patient presents with     Seizures     Annual follow up for med refills     Agatha Lawler CMA on 4/27/2022 at 10:46 AM

## 2022-04-27 NOTE — PROGRESS NOTES
NEUROLOGY FOLLOW UP VISIT  NOTE       [facility] NEUROLOGY [place]  [address] [place] [address]  Tel: [phone]  Fax: [phone]  [email or web address]     Elysia Arellano,  1965, MRN [id number]  PCP: Eduardo Clement  Date: 2022      ASSESSMENT & PLAN     Visit Diagnosis  1. Idiopathic generalized epilepsy, intractable (H)     Generalized intractable epilepsy  56-year-old female with mental retardation, cerebral palsy, intractable epilepsy is a resident of a group home who returns for yearly follow-up.  She has remained seizure-free.  I have recommended to continue current dose of Keppra, Lamictal and Topamax.  I am checking anticonvulsant level (recent Lamictal level was therapeutic), comprehensive metabolic panel.  Follow-up will be in 1 year    Thank you again for this referral, please feel free to contact me if you have any questions.    Lex Forman MD  [facility]  (Former[facility], [facility]     HISTORY OF PRESENT ILLNESS     Patient is a 56-year-old female with mental retardation, cerebral palsy, intractable epilepsy, resident of a group home who returns for yearly follow-up for her seizure disorder.  She was last seen on 4/15/2021 when she had remained seizure-free.  She was continued on Keppra, lamotrigine and Topamax and I had recommended checking blood levels that were not done.  Recently lamotrigine level was done that was within normal limits but Topamax and Keppra level were done but a year ago and was therapeutic.  According to caregiver there has been no significant change.  She has not experienced any seizures.  She is wheelchair-bound and minimally communicative     PROBLEM LIST   Patient Active Problem List   Diagnosis Code     Scoliosis M41.9     Intellectual disability F79     Cerebral palsy (H) G80.9     Idiopathic generalized epilepsy, intractable (H) G40.319     Gastroesophageal reflux disease  without esophagitis K21.9     History of Nissen fundoplication Z98.890     Osteoporosis M81.0     Absence of toe of right foot (H) Z89.421     Gastrostomy present (H) Z93.1         PAST MEDICAL & SURGICAL HISTORY     Past Medical History:   Patient  has a past medical history of Cerebral palsy (H), Depression, GERD (gastroesophageal reflux disease), Hyperopia, Mental retardation, Osteoporosis, Pneumonia due to 2019 novel coronavirus (8/7/2020), Pyelonephritis, Scoliosis, Seizure disorder (H), and UTI (urinary tract infection).    Surgical History:  She  has a past surgical history that includes IR Gastro Jejunostomy Tube Placement; Scoliosis S/P Lewis Valentín Placement; IR Miscellaneous Procedure (6/19/2000); IR Abscess Tube Change (6/27/2000); IR Abscess Tube Change (9/5/2000); IR Abscess Tube Change (3/15/2001); IR Abscess Tube Change (7/6/2001); IR Abscess Tube Change (9/4/2001); IR Miscellaneous Procedure (12/22/2001); IR Miscellaneous Procedure (1/7/2002); IR Miscellaneous Procedure (1/19/2002); IR Miscellaneous Procedure (1/19/2002); IR Miscellaneous Procedure (2/20/2002); IR Miscellaneous Procedure (2/20/2002); IR Miscellaneous Procedure (3/19/2002); IR Miscellaneous Procedure (4/16/2002); IR Miscellaneous Procedure (7/11/2002); IR Miscellaneous Procedure (7/11/2002); IR Gastrostomy Tube Change (7/18/2002); IR Miscellaneous Procedure (7/30/2002); IR Miscellaneous Procedure (8/16/2002); IR Miscellaneous Procedure (8/31/2002); IR Miscellaneous Procedure (9/18/2002); IR Miscellaneous Procedure (3/1/2003); IR Miscellaneous Procedure (8/5/2003); IR Miscellaneous Procedure (1/29/2004); IR Miscellaneous Procedure (3/18/2004); IR Miscellaneous Procedure (7/21/2004); IR Miscellaneous Procedure (11/15/2004); IR Miscellaneous Procedure (2/18/2005); IR Miscellaneous Procedure (4/8/2005); IR Miscellaneous Procedure (6/30/2005); IR Miscellaneous Procedure (9/30/2005); IR Miscellaneous Procedure (12/20/2005); IR  Miscellaneous Procedure (3/28/2006); IR Miscellaneous Procedure (6/30/2006); IR Miscellaneous Procedure (10/30/2006); IR Miscellaneous Procedure (2/28/2007); IR Miscellaneous Procedure (6/19/2007); IR Miscellaneous Procedure (9/25/2007); IR Miscellaneous Procedure (12/10/2007); IR Miscellaneous Procedure (1/5/2008); IR Miscellaneous Procedure (6/18/2008); IR Miscellaneous Procedure (8/25/2008); IR Miscellaneous Procedure (12/18/2008); IR Miscellaneous Procedure (2/18/2009); IR Miscellaneous Procedure (8/4/2009); IR Gastro Jejunostomy Tube Change (8/30/2009); IR Miscellaneous Procedure (11/6/2009); IR Miscellaneous Procedure (12/28/2009); IR Miscellaneous Procedure (4/13/2010); IR Miscellaneous Procedure (6/29/2010); IR Miscellaneous Procedure (10/11/2010); IR Miscellaneous Procedure (1/3/2011); IR Miscellaneous Procedure (2/25/2011); IR Miscellaneous Procedure (5/26/2011); IR Miscellaneous Procedure (8/23/2011); IR Miscellaneous Procedure (11/17/2011); IR Miscellaneous Procedure (2/15/2012); IR Miscellaneous Procedure (5/18/2012); IR Miscellaneous Procedure (8/16/2012); IR Miscellaneous Procedure (10/25/2012); IR Miscellaneous Procedure (1/14/2013); IR Miscellaneous Procedure (4/19/2013); IR Miscellaneous Procedure (7/24/2013); IR Miscellaneous Procedure (10/24/2013); IR Miscellaneous Procedure (12/23/2013); IR Miscellaneous Procedure (4/3/2014); IR Miscellaneous Procedure (5/20/2014); IR Gastro Jejunostomy Tube Change (8/5/2014); IR Gastro Jejunostomy Tube Change (11/5/2014); IR Gastro Jejunostomy Tube Change (2/18/2015); IR Gastro Jejunostomy Tube Change (5/18/2015); IR Gastro Jejunostomy Tube Change (8/17/2015); IR Gastro Jejunostomy Tube Change (10/28/2015); IR Gastro Jejunostomy Tube Change (1/16/2016); IR Gastro Jejunostomy Tube Change (4/14/2016); IR Gastro Jejunostomy Tube Change (5/13/2016); IR Gastro Jejunostomy Tube Change (6/16/2016); IR Gastro Jejunostomy Tube Change (7/20/2016); IR Gastro Jejunostomy  Tube Change (9/7/2016); IR Gastro Jejunostomy Tube Change (10/24/2016); IR Gastro Jejunostomy Tube Change (1/8/2017); IR Gastro Jejunostomy Tube Change (3/19/2017); IR Gastro Jejunostomy Tube Change (4/25/2017); IR Gastro Jejunostomy Tube Change (6/10/2017); IR Gastro Jejunostomy Tube Change (9/11/2017); IR Gastro Jejunostomy Tube Change (12/11/2017); IR Gastro Jejunostomy Tube Change (2/12/2018); IR Gastro Jejunostomy Tube Change (5/16/2018); IR Gastro Jejunostomy Tube Change (8/27/2018); IR Gastro Jejunostomy Tube Change (11/13/2018); IR Gastro Jejunostomy Tube Change (1/8/2019); IR Gastro Jejunostomy Tube Change (2/6/2019); IR Gastro Jejunostomy Tube Change (4/29/2019); IR Gastro Jejunostomy Tube Change (6/25/2019); IR Gastro Jejunostomy Tube Change (9/24/2019); IR Gastro Jejunostomy Tube Change (1/29/2020); IR Gastro Jejunostomy Tube Change (5/6/2020); IR Gastro Jejunostomy Tube Change (8/3/2020); IR Gastro Jejunostomy Tube Change (11/10/2020); IR Gastro Jejunostomy Tube Change (2/11/2021); IR Gastro Jejunostomy Tube Change (5/17/2021); Spinal Fusion; Ir Gj Tube Replacement (11/13/2018); Ir Gj Tube Replacement (1/8/2019); Ir Gj Tube Replacement (2/6/2019); Ir Gj Tube Replacement (4/29/2019); Ir Gj Tube Replacement (6/25/2019); Ir Gj Tube Replacement (9/24/2019); back surgery; Amputate toe(s) (Right, 11/21/2019); Ir Gj Tube Replacement (1/29/2020); Ir Gj Tube Replacement (5/6/2020); Ir Gj Tube Replacement (8/3/2020); Ir Gj Tube Replacement (11/10/2020); Ir Gj Tube Replacement (2/11/2021); Ir Gj Tube Replacement (5/17/2021); IR Gastro Jejunostomy Tube Change (8/16/2021); IR Gastro Jejunostomy Tube Change (11/19/2021); and IR Gastro Jejunostomy Tube Change (2/22/2022).     SOCIAL HISTORY     Reviewed, and she  reports that she has never smoked. She has never used smokeless tobacco. She reports that she does not drink alcohol and does not use drugs.     FAMILY HISTORY     Reviewed, and family history includes  Seizure Disorder in her sister.     ALLERGIES     Allergies   Allergen Reactions     Ciprofloxacin Rash     Other reaction(s): *Unknown     Buffered Aspirin      Other reaction(s): Unknown     Lanolin      Other reaction(s): *Unknown     Nsaids      Other reaction(s): Unknown     Augmentin Rash     Ibuprofen Rash     Other reaction(s): *Unknown     Salicylates Rash     Other reaction(s): Unknown         REVIEW OF SYSTEMS     A 12 point review of system was performed and was negative except as outlined in the history of present illness.     HOME MEDICATIONS     Current Outpatient Rx   Medication Sig Dispense Refill     bisacodyl (DULCOLAX) 10 MG suppository Place 10 mg rectally every other day       calcium carbonate 1250 MG/5ML SUSP suspension Take 600 mg by mouth daily Per G-tube       diazepam (VALIUM) 5 MG/ML (HIGH CONC) solution Take 5 mg by mouth every 8 hours as needed for anxiety       diazePAM 5 MG/5ML SOLN 5ML (5MG) PER G-TUBE AS NEEDED FOR SEIZURE MAY REPEAT ONCE IN 15 MINUTE MAX  2 DOSES IN 24 HOURS. *SPLIT* 60 mL 1     diphenhydrAMINE (BENADRYL) 12.5 MG/5ML solution Take 25 mg by mouth every 4 hours as needed for sleep        folic acid (FOLVITE) 1 MG tablet Take 1 mg by mouth daily Per G- tube       hydrocortisone (CORTAID) 1 % external cream Apply topically 2 times daily as needed To affected area of abdomen       lamoTRIgine (LAMICTAL) 200 MG tablet TAKE 1 TABLET PER G-TUBE TWICE DAILY 62 tablet 11     lamoTRIgine (LAMICTAL) 25 MG tablet Take 3 tablets (75 mg) by mouth 2 times daily Per G-tube 180 tablet 11     levETIRAcetam (KEPPRA) 100 MG/ML solution GIVE 10ML (1000MG) PER G-TUBE TWICE DAILY 600 mL 0     levothyroxine (SYNTHROID/LEVOTHROID) 50 MCG tablet Take 50 mcg by mouth daily Per G-tube       multivitamin w/minerals (THERA-VIT-M) tablet Take 1 tablet by mouth daily Per G-tube       topiramate (TOPAMAX) 200 MG tablet 1 tablet (200 mg) by Per G Tube route 2 times daily 60 tablet 11     VITAMIN D  "(CHOLECALCIFEROL) PO Take 400 Units by mouth daily 2.5ml qd       acetaminophen (TYLENOL) 325 MG tablet Take 650 mg by mouth every 4 hours as needed for mild pain As needed for pain, discomfort, and fever.  Not to exceed 3000 mg in 24 hours.       arginine (ARGINAID) PACK Take 1 packet by mouth 2 times daily Per G-tube mixed with 4 ounces of water       carboxymethylcellulose PF (REFRESH PLUS) 0.5 % SOLN ophthalmic solution 1 drop At Bedtime       clonazePAM (KLONOPIN) 0.5 MG tablet Take 0.25 mg by mouth At Bedtime Per G-tube (Patient not taking: Reported on 4/27/2022)       desonide (DESOWEN) 0.05 % external cream Apply topically 2 times daily .  Hold if no red, itchy, scaly areas on face.       Dextromethorphan-guaiFENesin  MG/5ML syrup Take 10 mLs by mouth every 6 hours as needed for cough       nitroFURantoin (FURADANTIN) 25 MG/5ML suspension Take 100 mg by mouth 4 times daily Per G-tube       nystatin (MYCOSTATIN) 038615 UNIT/GM external cream Apply topically 3 times daily as needed for dry skin To groin, and two times daily to G-tube site       polyethylene glycol (MIRALAX/GLYCOLAX) packet Take 1 packet by mouth 2 times daily as needed for constipation Per G-tube, mix with 150 ml of water       protein modular, BENEPROTEIN, PACK 7 g by Per Feeding Tube route 2 times daily       Psyllium (GENFIBER PO) Take 2 Tablespoonful by mouth daily Per G-tube with 4 ounces of water flush       rectal diazepam KIT 1 kit once as needed for medication administration 10 mg rectally once for seizure lasting over 5 minutes.       Sodium Phosphates (FLEET ENEMA RE) Place rectally every other day If no results from suppository           PHYSICAL EXAM     Vital signs  BP 98/67 (BP Location: Left arm, Patient Position: Sitting)   Pulse 66   Ht 1.499 m (4' 11\")   Wt 48.1 kg (106 lb)   BMI 21.41 kg/m      Weight:   106 lbs 0 oz    General physical exam reveals a middle-aged female who is sitting in a chair alert and oriented " ×0 noncommunicative. She occasionally tracks. Vital signs were reviewed and are documented in electronic medical record. She withdraws all 4 extremities to painful stimuli reflexes 1+ she has increased tone. She is wheelchair-bound.     PERTINENT DIAGNOSTIC STUDIES     Following studies were reviewed:     CT HEAD 7/1/19  1. No acute intracranial abnormality. 2. Unchanged mild cerebral volume loss and severe cerebellar volume loss.     EEG 11/20/2019  This is an abnormal awake and drowsy EEG due to background   slowing, most suggestive of severe compromised of brain function, due to   encephalopathy or organic logan syndrome of non-specific etiology. No   seizure activities detected during study. Clinical correlation is   recommended.      PERTINENT LABS  Following labs were reviewed:  Lab Requisition on 03/03/2022   Component Date Value     Lamotrigine 03/03/2022 9.3    Lab on 02/18/2022   Component Date Value     SARS CoV2 PCR 02/18/2022 Negative    Lab Requisition on 02/09/2022   Component Date Value     Albumin 02/09/2022 3.7      Bilirubin Total 02/09/2022 0.2      Bilirubin Direct 02/09/2022 0.1      Protein Total 02/09/2022 7.3      Albumin 02/09/2022 3.7      Alkaline Phosphatase 02/09/2022 254 (A)     AST 02/09/2022 32      ALT 02/09/2022 18      Cholesterol 02/09/2022 177      Triglycerides 02/09/2022 104      Direct Measure HDL 02/09/2022 37 (A)     LDL Cholesterol Calculat* 02/09/2022 119      Patient Fasting > 8hrs? 02/09/2022 No      Ammonia 02/09/2022 47 (A)     Sodium 02/09/2022 139      Potassium 02/09/2022 4.1      Chloride 02/09/2022 105      Carbon Dioxide (CO2) 02/09/2022 25      Anion Gap 02/09/2022 9      Urea Nitrogen 02/09/2022 19      Creatinine 02/09/2022 0.51 (A)     Calcium 02/09/2022 9.6      Glucose 02/09/2022 108      Albumin 02/09/2022 3.6      Phosphorus 02/09/2022 3.3      GFR Estimate 02/09/2022 >90      WBC Count 02/09/2022 6.7      RBC Count 02/09/2022 4.55      Hemoglobin  02/09/2022 14.2      Hematocrit 02/09/2022 45.0      MCV 02/09/2022 99      MCH 02/09/2022 31.2      MCHC 02/09/2022 31.6      RDW 02/09/2022 13.0      Platelet Count 02/09/2022 257          Total time spent for face to face visit, reviewing labs/imaging studies, counseling and coordination of care was: 30 Minutes spent on the date of the encounter doing chart review, review of outside records, review of test results, interpretation of tests, patient visit and documentation       This note was dictated using voice recognition software.  Any grammatical or context distortions are unintentional and inherent to the software.    Orders Placed This Encounter   Procedures     Keppra (Levetiracetam) Level     Topiramate Level     Comprehensive metabolic panel      New Prescriptions    No medications on file     Modified Medications    Modified Medication Previous Medication    LAMOTRIGINE (LAMICTAL) 200 MG TABLET lamoTRIgine (LAMICTAL) 200 MG tablet       TAKE 1 TABLET PER G-TUBE TWICE DAILY    TAKE 1 TABLET PER G-TUBE TWICE DAILY *1 TOTAL FILL*    LAMOTRIGINE (LAMICTAL) 25 MG TABLET lamoTRIgine (LAMICTAL) 25 MG tablet       Take 3 tablets (75 mg) by mouth 2 times daily Per G-tube    Take 3 tablets (75 mg) by mouth 2 times daily Per G-tube    LEVETIRACETAM (KEPPRA) 100 MG/ML SOLUTION levETIRAcetam (KEPPRA) 100 MG/ML solution       GIVE 10ML (1000MG) PER G-TUBE TWICE DAILY    GIVE 10ML (1000MG) PER G-TUBE TWICE DAILY    TOPIRAMATE (TOPAMAX) 200 MG TABLET topiramate (TOPAMAX) 200 MG tablet       1 tablet (200 mg) by Per G Tube route 2 times daily    TAKE 1 TABLET PER G-TUBE TWICE DAILY

## 2022-04-29 ENCOUNTER — LAB REQUISITION (OUTPATIENT)
Dept: LAB | Facility: HOSPITAL | Age: 57
End: 2022-04-29
Payer: MEDICARE

## 2022-04-29 DIAGNOSIS — D64.9 ANEMIA, UNSPECIFIED: ICD-10-CM

## 2022-04-29 DIAGNOSIS — R56.9 UNSPECIFIED CONVULSIONS (H): ICD-10-CM

## 2022-04-29 DIAGNOSIS — G40.909 EPILEPSY, UNSPECIFIED, NOT INTRACTABLE, WITHOUT STATUS EPILEPTICUS (H): ICD-10-CM

## 2022-04-29 LAB
ALBUMIN SERPL-MCNC: 3.7 G/DL (ref 3.5–5)
ALBUMIN SERPL-MCNC: 3.7 G/DL (ref 3.5–5)
ALP SERPL-CCNC: 259 U/L (ref 45–120)
ALT SERPL W P-5'-P-CCNC: 14 U/L (ref 0–45)
AMMONIA PLAS-SCNC: 43 UMOL/L (ref 11–35)
ANION GAP SERPL CALCULATED.3IONS-SCNC: 10 MMOL/L (ref 5–18)
AST SERPL W P-5'-P-CCNC: 25 U/L (ref 0–40)
BILIRUB DIRECT SERPL-MCNC: 0.1 MG/DL
BILIRUB SERPL-MCNC: 0.3 MG/DL (ref 0–1)
BUN SERPL-MCNC: 27 MG/DL (ref 8–22)
CALCIUM SERPL-MCNC: 9.9 MG/DL (ref 8.5–10.5)
CHLORIDE BLD-SCNC: 104 MMOL/L (ref 98–107)
CHOLEST SERPL-MCNC: 196 MG/DL
CO2 SERPL-SCNC: 28 MMOL/L (ref 22–31)
CREAT SERPL-MCNC: 0.57 MG/DL (ref 0.6–1.1)
ERYTHROCYTE [DISTWIDTH] IN BLOOD BY AUTOMATED COUNT: 12.8 % (ref 10–15)
GFR SERPL CREATININE-BSD FRML MDRD: >90 ML/MIN/1.73M2
GLUCOSE BLD-MCNC: 94 MG/DL (ref 70–125)
HCT VFR BLD AUTO: 45.4 % (ref 35–47)
HDLC SERPL-MCNC: 44 MG/DL
HGB BLD-MCNC: 14.8 G/DL (ref 11.7–15.7)
LDLC SERPL CALC-MCNC: 127 MG/DL
MCH RBC QN AUTO: 31.5 PG (ref 26.5–33)
MCHC RBC AUTO-ENTMCNC: 32.6 G/DL (ref 31.5–36.5)
MCV RBC AUTO: 97 FL (ref 78–100)
PHOSPHATE SERPL-MCNC: 3.8 MG/DL (ref 2.5–4.5)
PLATELET # BLD AUTO: 253 10E3/UL (ref 150–450)
POTASSIUM BLD-SCNC: 4 MMOL/L (ref 3.5–5)
PROT SERPL-MCNC: 7.4 G/DL (ref 6–8)
RBC # BLD AUTO: 4.7 10E6/UL (ref 3.8–5.2)
SODIUM SERPL-SCNC: 142 MMOL/L (ref 136–145)
TRIGL SERPL-MCNC: 127 MG/DL
WBC # BLD AUTO: 6.5 10E3/UL (ref 4–11)

## 2022-04-29 PROCEDURE — 82248 BILIRUBIN DIRECT: CPT | Mod: ORL | Performed by: FAMILY MEDICINE

## 2022-04-29 PROCEDURE — 80177 DRUG SCRN QUAN LEVETIRACETAM: CPT | Mod: ORL | Performed by: FAMILY MEDICINE

## 2022-04-29 PROCEDURE — 80175 DRUG SCREEN QUAN LAMOTRIGINE: CPT | Mod: ORL | Performed by: FAMILY MEDICINE

## 2022-04-29 PROCEDURE — 80053 COMPREHEN METABOLIC PANEL: CPT | Mod: ORL | Performed by: FAMILY MEDICINE

## 2022-04-29 PROCEDURE — 80061 LIPID PANEL: CPT | Mod: ORL | Performed by: FAMILY MEDICINE

## 2022-04-29 PROCEDURE — 85027 COMPLETE CBC AUTOMATED: CPT | Mod: ORL | Performed by: FAMILY MEDICINE

## 2022-04-29 PROCEDURE — 82140 ASSAY OF AMMONIA: CPT | Mod: ORL | Performed by: FAMILY MEDICINE

## 2022-04-29 PROCEDURE — 36415 COLL VENOUS BLD VENIPUNCTURE: CPT | Mod: ORL | Performed by: FAMILY MEDICINE

## 2022-04-29 PROCEDURE — 80201 ASSAY OF TOPIRAMATE: CPT | Mod: ORL | Performed by: FAMILY MEDICINE

## 2022-04-30 LAB
LAMOTRIGINE SERPL-MCNC: 9.7 UG/ML
LEVETIRACETAM SERPL-MCNC: 44 UG/ML
TOPIRAMATE SERPL-MCNC: 16 UG/ML

## 2022-05-06 ENCOUNTER — TELEPHONE (OUTPATIENT)
Dept: NEUROLOGY | Facility: CLINIC | Age: 57
End: 2022-05-06
Payer: MEDICARE

## 2022-05-06 DIAGNOSIS — G40.319 IDIOPATHIC GENERALIZED EPILEPSY, INTRACTABLE (H): ICD-10-CM

## 2022-05-06 NOTE — TELEPHONE ENCOUNTER
[facility] Call Center    Phone Message    May a detailed message be left on voicemail: yes     Reason for Call: Other: Delmi from [facility] called and stated that the most recent prescription for lamoTRIgine (LAMICTAL) 200 MG tablet was sent in as regular tablets and the patient normally gets the chewables. Delmi would like to know if it is okay to continue giving the Pt the chewables as it is much easier for the Pt. Please call back with further information.      Action Taken: Message routed to:  [facility]    Travel Screening: Not Applicable

## 2022-05-09 NOTE — TELEPHONE ENCOUNTER
Spoke with pharmacist at [facility] and let her know that it is okay for them to switch Lamictal to chewable tablets.     Adela Bennett RN on 5/9/2022 at 9:16 AM

## 2022-05-13 ENCOUNTER — TELEPHONE (OUTPATIENT)
Dept: NEUROLOGY | Facility: CLINIC | Age: 57
End: 2022-05-13
Payer: MEDICARE

## 2022-05-13 NOTE — TELEPHONE ENCOUNTER
[facility] Call Center    Phone Message    May a detailed message be left on voicemail: yes     Reason for Call: Other: [facility] called back and stated that they also need a verbal okay to give the chewable tablets for the lamoTRIgine (LAMICTAL) 25 MG tablet as well. They stated that they got the okay for the 200mg but not the 25mg. Please call back with a verbal okay for this switch if allowed.     Action Taken: Message routed to:  [facility]    Travel Screening: Not Applicable

## 2022-05-13 NOTE — TELEPHONE ENCOUNTER
Spoke with pharmacist and let her know that it is okay for pt to take chewable Lamictal. She verbalized understanding.    Adela Bennett RN on 5/13/2022 at 10:21 AM

## 2022-07-22 ENCOUNTER — HOSPITAL ENCOUNTER (OUTPATIENT)
Dept: INTERVENTIONAL RADIOLOGY/VASCULAR | Facility: HOSPITAL | Age: 57
Discharge: HOME | End: 2022-07-22
Attending: RADIOLOGY | Admitting: RADIOLOGY
Payer: MEDICARE

## 2022-07-22 DIAGNOSIS — T85.528A GASTROJEJUNOSTOMY TUBE DISLODGEMENT: ICD-10-CM

## 2022-07-22 DIAGNOSIS — Z93.4 GASTROJEJUNOSTOMY TUBE STATUS (H): Primary | ICD-10-CM

## 2022-07-22 PROCEDURE — 255N000002 HC RX 255 OP 636: Performed by: RADIOLOGY

## 2022-07-22 PROCEDURE — 49452 REPLACE G-J TUBE PERC: CPT

## 2022-07-22 PROCEDURE — C1769 GUIDE WIRE: HCPCS

## 2022-07-22 PROCEDURE — 272N000116 HC CATH CR1

## 2022-07-22 RX ADMIN — IOHEXOL 10 ML: 300 INJECTION, SOLUTION INTRAVENOUS at 09:23

## 2022-07-27 ENCOUNTER — LAB REQUISITION (OUTPATIENT)
Dept: LAB | Facility: CLINIC | Age: 57
End: 2022-07-27
Payer: MEDICARE

## 2022-07-27 DIAGNOSIS — R82.90 UNSPECIFIED ABNORMAL FINDINGS IN URINE: ICD-10-CM

## 2022-07-27 PROCEDURE — 87086 URINE CULTURE/COLONY COUNT: CPT | Mod: ORL | Performed by: FAMILY MEDICINE

## 2022-07-29 LAB — BACTERIA UR CULT: NORMAL

## 2022-08-12 ENCOUNTER — HOSPITAL ENCOUNTER (OUTPATIENT)
Dept: INTERVENTIONAL RADIOLOGY/VASCULAR | Facility: HOSPITAL | Age: 57
Discharge: HOME | End: 2022-08-12
Attending: NURSE PRACTITIONER | Admitting: RADIOLOGY
Payer: MEDICARE

## 2022-08-12 DIAGNOSIS — Z93.4 GASTROJEJUNOSTOMY TUBE STATUS (H): Primary | ICD-10-CM

## 2022-08-12 DIAGNOSIS — Z93.4 GASTROJEJUNOSTOMY TUBE STATUS (H): ICD-10-CM

## 2022-08-12 PROCEDURE — 49452 REPLACE G-J TUBE PERC: CPT

## 2022-08-12 PROCEDURE — 255N000002 HC RX 255 OP 636: Performed by: NURSE PRACTITIONER

## 2022-08-12 PROCEDURE — C1769 GUIDE WIRE: HCPCS

## 2022-08-12 RX ADMIN — IOHEXOL 20 ML: 350 INJECTION, SOLUTION INTRAVENOUS at 15:34

## 2022-08-12 NOTE — PROGRESS NOTES
Staff reports leaking from the J- port started 2 days ago. No other issues noted. Had been working fine. Site looks good

## 2022-08-15 DIAGNOSIS — G40.319 IDIOPATHIC GENERALIZED EPILEPSY, INTRACTABLE (H): ICD-10-CM

## 2022-08-15 RX ORDER — DIAZEPAM 5 MG/5ML
SOLUTION ORAL
Qty: 60 ML | Refills: 0 | Status: SHIPPED | OUTPATIENT
Start: 2022-08-15 | End: 2023-05-24

## 2022-08-15 NOTE — LETTER
8/15/2022        RE: Elysia Arellano  [address] [facility] [address]              Dear Elysia,      We recently provided you with medication refills.  Many medications require routine follow-up with your doctor.    Your prescription(s) have been refilled for 30 days so you may have time for the above noted follow-up. Please call to schedule soon so we can assure you have an appointment before your next refills are needed. If you have already made a follow up appointment, please disregard this letter.         Sincerely,        [facility]     (Former[facility] known [facility])  Dr. Forman's Care Team

## 2022-08-15 NOTE — TELEPHONE ENCOUNTER
Refill request for diazepam solution.  Due for follow up appt. Letter mailed to pt to remind to schedule.  30 day supply of Medication T'd for review and signature    Verónica Blank LPN on 8/15/2022 at 2:26 PM

## 2022-09-02 DIAGNOSIS — G40.319 IDIOPATHIC GENERALIZED EPILEPSY, INTRACTABLE (H): ICD-10-CM

## 2022-09-06 ENCOUNTER — LAB REQUISITION (OUTPATIENT)
Dept: LAB | Facility: HOSPITAL | Age: 57
End: 2022-09-06
Payer: MEDICARE

## 2022-09-06 DIAGNOSIS — G80.8 OTHER CEREBRAL PALSY (H): ICD-10-CM

## 2022-09-06 DIAGNOSIS — K21.00 GASTRO-ESOPHAGEAL REFLUX DISEASE WITH ESOPHAGITIS, WITHOUT BLEEDING: ICD-10-CM

## 2022-09-06 DIAGNOSIS — G40.901 EPILEPSY, UNSPECIFIED, NOT INTRACTABLE, WITH STATUS EPILEPTICUS (H): ICD-10-CM

## 2022-09-06 PROCEDURE — 36415 COLL VENOUS BLD VENIPUNCTURE: CPT | Mod: ORL | Performed by: PSYCHIATRY & NEUROLOGY

## 2022-09-06 PROCEDURE — 80175 DRUG SCREEN QUAN LAMOTRIGINE: CPT | Mod: ORL | Performed by: PSYCHIATRY & NEUROLOGY

## 2022-09-06 RX ORDER — LEVETIRACETAM 100 MG/ML
SOLUTION ORAL
Qty: 600 ML | Refills: 11 | Status: SHIPPED | OUTPATIENT
Start: 2022-09-06 | End: 2023-05-24

## 2022-09-07 LAB — LAMOTRIGINE SERPL-MCNC: 10 UG/ML

## 2022-11-17 ENCOUNTER — HOSPITAL ENCOUNTER (OUTPATIENT)
Dept: INTERVENTIONAL RADIOLOGY/VASCULAR | Facility: CLINIC | Age: 57
Discharge: HOME | End: 2022-11-17
Attending: NURSE PRACTITIONER | Admitting: RADIOLOGY
Payer: MEDICARE

## 2022-11-17 DIAGNOSIS — Z93.4 GASTROJEJUNOSTOMY TUBE STATUS (H): ICD-10-CM

## 2022-11-17 PROCEDURE — C1769 GUIDE WIRE: HCPCS

## 2022-11-17 PROCEDURE — 49452 REPLACE G-J TUBE PERC: CPT

## 2022-11-17 NOTE — DISCHARGE INSTRUCTIONS
Gastrojejunostomy (GJ) Tube Discharge Instructions:   You had a gastrojejunostomy (stomach-intestinal) also known as GJ tube exchanged on 11/17/2022. This tube is often used for nutritional support, medication administration and/or stomach venting.     Care instructions:  - If you received sedation for your procedure, do not drive or operate heavy machinery for the rest of the day.  - Avoid soaking in stagnant water (tub baths, Jacuzzis, pools, lake, or ocean).   - You may shower beginning the day after the tube was placed.   - Clean under the disc daily with soap and water. Pat dry under disc and apply new split gauze dressing under disc. Cleaning tube site daily will help prevent infection and skin irritation.  - A small amount of clear tan drainage from the tube exit site can be normal.  - Make sure the disc on the tube fits slightly snug against the skin so that the tube does not move in or out of the body easily.  - Flush your tube with 60cc of water twice a day (using a cath tip syringe) to prevent tube from clogging (or follow recommendations from your dietician if given).    Giving Feedings and Medications:  - Follow-up with your dietician, primary care provider, or oncologist for instructions on tube feedings and medication administration.    - ONLY use specific enteric feedings with your GJ tube (unless otherwise discussed with your dietitian).    - Flush tube at least twice daily with 60ml of water using cath tip syringe or follow dietician's instructions if given.  - Flush the tube before and after administrating medications and bolus feedings with 60cc of water.  - Note: Most jejunal (J port) feedings are given by a continuous method. A continuous feeding is given with a pump over a period of time, usually 12 to 24 hours based upon your specific situation.    Follow Up:  - Routine 3 month exchanges of feeding tube is recommended. Please contact [facility] at [phone]*** to arrange a GJ  exchange appointment.  - GJ tubes CANNOT be removed until 6 weeks after date of tube placement (Tube placed ***).  - T-fasteners/Buttons (suture) should be removed 7-10 days after tube placement. This may have been done while you were still and inpatient, or can be completed in your outpatient clinic or care facility. Please contact [facility] for T fastener questions or concerns at [phone]***.    Please seek medical evaluation for:  - Fever (greater than 101 F (38.3C)).  - Purulent (yellow/green/foul smelling) drainage from tube exit site.   - Significant or worsening abdominal pain.   - Skin that is hot to the touch or significantly reddened at the tube exit site.   - Bleeding at tube exit site.    Call [facility] at [phone]*** with questions or if you have any of the following symptoms:  - Tube falls out or felt to be out of position.  - Unable to flush tube.  - Significant leakage around tube site (tube feeding, medications or drainage).  - Significant bleeding at the tube exit site.  - Severe pain at tube exit site.

## 2022-11-17 NOTE — PROGRESS NOTES
Patient Name: Elysia Arellano  Medical Record Number: [id number]  Today's Date: 11/17/2022    Procedure: gastrojejunostomy tube exchange  Proceduralist: Dr. Palma      Other Notes Pt accompanied by staff from [facility].  Pt arrived to IR room 1 from Pre/post bay 1.  Pt positioned supine and monitored per protocol. Pt tolerated procedure without any noted complications. VSS on RA. Pt transferred back to Pre/post bay 1.    Discharge instructions reviewed with caregiver; AVS provided and all questions answered.  Patient discharged back to [facility] accompanied by group home staff member via van transport.

## 2022-11-17 NOTE — IP AVS SNAPSHOT
[facility]  [address] [address]  Phone: [phone]  Fax: [phone]                                    After Visit Summary   11/17/2022    Elysia Arellano   MRN: [id number]           After Visit Summary Signature Page    I have received my discharge instructions, and my questions have been answered. I have discussed any challenges I see with this plan with the nurse or doctor.    ..........................................................................................................................................  Patient/Patient Representative Signature      ..........................................................................................................................................  Patient Representative Print Name and Relationship to Patient    ..................................................               ................................................  Date                                   Time    ..........................................................................................................................................  Reviewed by Signature/Title    ...................................................              ..............................................  Date                                               Time          22EPIC Rev 08/18

## 2023-02-10 ENCOUNTER — LAB REQUISITION (OUTPATIENT)
Dept: LAB | Facility: HOSPITAL | Age: 58
End: 2023-02-10
Payer: MEDICARE

## 2023-02-10 ENCOUNTER — TELEPHONE (OUTPATIENT)
Dept: INTERVENTIONAL RADIOLOGY/VASCULAR | Facility: CLINIC | Age: 58
End: 2023-02-10
Payer: MEDICARE

## 2023-02-10 DIAGNOSIS — R56.9 UNSPECIFIED CONVULSIONS (H): ICD-10-CM

## 2023-02-10 DIAGNOSIS — D64.9 ANEMIA, UNSPECIFIED: ICD-10-CM

## 2023-02-10 LAB
ALBUMIN SERPL BCG-MCNC: 3.8 G/DL (ref 3.5–5.2)
ALBUMIN SERPL BCG-MCNC: 3.8 G/DL (ref 3.5–5.2)
ALP SERPL-CCNC: 230 U/L (ref 35–104)
ALT SERPL W P-5'-P-CCNC: 7 U/L (ref 10–35)
AMMONIA PLAS-SCNC: 50 UMOL/L (ref 11–51)
ANION GAP SERPL CALCULATED.3IONS-SCNC: 12 MMOL/L (ref 7–15)
AST SERPL W P-5'-P-CCNC: 20 U/L (ref 10–35)
BILIRUB DIRECT SERPL-MCNC: <0.2 MG/DL (ref 0–0.3)
BILIRUB SERPL-MCNC: 0.2 MG/DL
BUN SERPL-MCNC: 18.9 MG/DL (ref 6–20)
CALCIUM SERPL-MCNC: 9.8 MG/DL (ref 8.6–10)
CHLORIDE SERPL-SCNC: 101 MMOL/L (ref 98–107)
CHOLEST SERPL-MCNC: 174 MG/DL
CREAT SERPL-MCNC: 0.33 MG/DL (ref 0.51–0.95)
DEPRECATED HCO3 PLAS-SCNC: 26 MMOL/L (ref 22–29)
ERYTHROCYTE [DISTWIDTH] IN BLOOD BY AUTOMATED COUNT: 13 % (ref 10–15)
GFR SERPL CREATININE-BSD FRML MDRD: >90 ML/MIN/1.73M2
GLUCOSE SERPL-MCNC: 90 MG/DL (ref 70–99)
HCT VFR BLD AUTO: 42.3 % (ref 35–47)
HDLC SERPL-MCNC: 42 MG/DL
HGB BLD-MCNC: 13.8 G/DL (ref 11.7–15.7)
LDLC SERPL CALC-MCNC: 114 MG/DL
MCH RBC QN AUTO: 31.3 PG (ref 26.5–33)
MCHC RBC AUTO-ENTMCNC: 32.6 G/DL (ref 31.5–36.5)
MCV RBC AUTO: 96 FL (ref 78–100)
NONHDLC SERPL-MCNC: 132 MG/DL
PHOSPHATE SERPL-MCNC: 3.7 MG/DL (ref 2.5–4.5)
PLATELET # BLD AUTO: 256 10E3/UL (ref 150–450)
POTASSIUM SERPL-SCNC: 3.6 MMOL/L (ref 3.4–5.3)
PROT SERPL-MCNC: 7.2 G/DL (ref 6.4–8.3)
RBC # BLD AUTO: 4.41 10E6/UL (ref 3.8–5.2)
SODIUM SERPL-SCNC: 139 MMOL/L (ref 136–145)
TRIGL SERPL-MCNC: 88 MG/DL
WBC # BLD AUTO: 7 10E3/UL (ref 4–11)

## 2023-02-10 PROCEDURE — 82248 BILIRUBIN DIRECT: CPT | Mod: ORL | Performed by: FAMILY MEDICINE

## 2023-02-10 PROCEDURE — 80061 LIPID PANEL: CPT | Mod: ORL | Performed by: FAMILY MEDICINE

## 2023-02-10 PROCEDURE — 36415 COLL VENOUS BLD VENIPUNCTURE: CPT | Mod: ORL | Performed by: FAMILY MEDICINE

## 2023-02-10 PROCEDURE — 85027 COMPLETE CBC AUTOMATED: CPT | Mod: ORL | Performed by: FAMILY MEDICINE

## 2023-02-10 PROCEDURE — 84100 ASSAY OF PHOSPHORUS: CPT | Mod: ORL | Performed by: FAMILY MEDICINE

## 2023-02-10 PROCEDURE — 82140 ASSAY OF AMMONIA: CPT | Mod: ORL | Performed by: FAMILY MEDICINE

## 2023-02-14 ENCOUNTER — HOSPITAL ENCOUNTER (OUTPATIENT)
Dept: INTERVENTIONAL RADIOLOGY/VASCULAR | Facility: HOSPITAL | Age: 58
Discharge: HOME | End: 2023-02-14
Attending: NURSE PRACTITIONER | Admitting: RADIOLOGY
Payer: MEDICARE

## 2023-02-14 DIAGNOSIS — Z93.4 GASTROJEJUNOSTOMY TUBE STATUS (H): ICD-10-CM

## 2023-02-14 PROCEDURE — C1769 GUIDE WIRE: HCPCS

## 2023-02-14 PROCEDURE — 49452 REPLACE G-J TUBE PERC: CPT

## 2023-02-14 PROCEDURE — 255N000002 HC RX 255 OP 636: Performed by: NURSE PRACTITIONER

## 2023-02-14 RX ADMIN — IOHEXOL 20 ML: 350 INJECTION, SOLUTION INTRAVENOUS at 10:50

## 2023-02-14 NOTE — PROVIDER NOTIFICATION
02/14/2023 Pt arrives via her personal wheelchair. Routine GJ tube exchange. No issues with the tube at this time. Site is clean and dry.

## 2023-03-10 ENCOUNTER — LAB REQUISITION (OUTPATIENT)
Dept: LAB | Facility: HOSPITAL | Age: 58
End: 2023-03-10
Payer: MEDICARE

## 2023-03-10 DIAGNOSIS — K21.00 GASTRO-ESOPHAGEAL REFLUX DISEASE WITH ESOPHAGITIS, WITHOUT BLEEDING: ICD-10-CM

## 2023-03-10 DIAGNOSIS — G80.8 OTHER CEREBRAL PALSY (H): ICD-10-CM

## 2023-03-10 DIAGNOSIS — G40.901 EPILEPSY, UNSPECIFIED, NOT INTRACTABLE, WITH STATUS EPILEPTICUS (H): ICD-10-CM

## 2023-03-10 PROCEDURE — 80175 DRUG SCREEN QUAN LAMOTRIGINE: CPT | Mod: ORL | Performed by: FAMILY MEDICINE

## 2023-03-11 LAB — LAMOTRIGINE SERPL-MCNC: 8.9 UG/ML

## 2023-04-19 ENCOUNTER — HOSPITAL ENCOUNTER (OUTPATIENT)
Dept: INTERVENTIONAL RADIOLOGY/VASCULAR | Facility: HOSPITAL | Age: 58
Discharge: HOME | End: 2023-04-19
Attending: PHYSICIAN ASSISTANT | Admitting: RADIOLOGY
Payer: MEDICARE

## 2023-04-19 VITALS
DIASTOLIC BLOOD PRESSURE: 75 MMHG | OXYGEN SATURATION: 98 % | SYSTOLIC BLOOD PRESSURE: 120 MMHG | HEART RATE: 76 BPM | TEMPERATURE: 98.9 F

## 2023-04-19 DIAGNOSIS — R63.30 FEEDING DIFFICULTIES: Primary | ICD-10-CM

## 2023-04-19 DIAGNOSIS — R63.30 FEEDING DIFFICULTIES: ICD-10-CM

## 2023-04-19 PROCEDURE — 49452 REPLACE G-J TUBE PERC: CPT

## 2023-04-19 PROCEDURE — C1769 GUIDE WIRE: HCPCS

## 2023-04-19 PROCEDURE — 255N000002 HC RX 255 OP 636: Performed by: PHYSICIAN ASSISTANT

## 2023-04-19 RX ADMIN — IOHEXOL 10 ML: 350 INJECTION, SOLUTION INTRAVENOUS at 13:55

## 2023-04-19 NOTE — DISCHARGE INSTRUCTIONS
Gastrostomy (G) or Gastrojejunostomy (G/J) Tube Exchange Discharge Instructions:   You had a gastrostomy (stomach) tube or a Gastrojejunostomy Tube (stomach and jejunum) exchanged on ***.   This tube is often used for nutritional support and medication administration or it can be used for stomach venting.     Care instructions:  - If you received sedation for your procedure, do not drive or operate heavy machinery for the rest of the day.  - Avoid soaking in stagnant water (tub baths, Jacuzzis, pools, lake, or ocean).   - You may shower beginning the day after the tube was exchanged.   - Clean under the disc daily with soap and water. Pat dry under disc and apply new split gauze dressing under disc. Cleaning tube site daily will help prevent infection and skin irritation.  - A small amount of clear tan drainage from the tube exit site can be normal.  - Make sure the disc on the tube fits slightly snug against the skin so that the tube does not move in or out of the body easily.  - Flush your tube with 60cc of water twice a day (using a cath tip syringe) to prevent tube from clogging (or follow recommendations from your doctor or dietician if given).    Giving Feedings and Medications:  - Follow-up with your dietician, primary care provider, or oncologist for instructions on tube feedings and medication administration.    - ONLY use specific enteric feedings with your tube (unless otherwise discussed with your dietitian).    - Flush tube at least twice daily with 60ml of water using cath tip syringe unless otherwise instructed by your doctor or dietician.  - Flush the tube before and after administrating medications and bolus feedings with 60cc of water.    Follow Up:  -Recommend routine 3 month exchanges of feeding tube. Please contact your primary care provider or speak with your dietitian to obtain an order to have your G tube exchanged. Then contact [facility] scheduling department at  [phone] to schedule your G tube exchange appointment.  - G tubes CANNOT be removed until 6 weeks after date of tube placement.    Please seek medical evaluation for:  - Fever (greater than 101 F (38.3C).  - Purulent (yellow/green/foul smelling) drainage from tube exit site.   - Significant or worsening abdominal pain.   - Skin that is hot to the touch or significantly reddened at the tube exit site.   - Bleeding at tube exit site.    Call [facility] at [phone] with questions or if you have any of the following symptoms:  - Tube falls out or felt to be out of position.  - Unable to flush tube.  - Significant leakage around tube site (tube feeding, medications or drainage).  - Significant bleeding at the tube exit site.  - Severe pain at tube exit site.

## 2023-04-19 NOTE — PROGRESS NOTES
Pt discharged to home accompanied by staff/care giver.  Post change instructions reviewed.  Facility paperwork completed and returned to staff.  Staff acknowledged understanding of care and contact information for any issues.  Transported via personal wheelchair and vehicle.

## 2023-04-19 NOTE — IP AVS SNAPSHOT
[facility]  [address] [place] [id number]  Phone: [phone]  Fax: [phone]                              After Visit Summary   4/19/2023    Elysia Arellano   MRN: [id number]           After Visit Summary Signature Page    I have received my discharge instructions, and my questions have been answered. I have discussed any challenges I see with this plan with the nurse or doctor.    ..........................................................................................................................................  Patient/Patient Representative Signature      ..........................................................................................................................................  Patient Representative Print Name and Relationship to Patient    ..................................................               ................................................  Date                                   Time    ..........................................................................................................................................  Reviewed by Signature/Title    ...................................................              ..............................................  Date                                               Time    22EPIC Rev 08/18

## 2023-04-27 ENCOUNTER — LAB REQUISITION (OUTPATIENT)
Dept: LAB | Facility: HOSPITAL | Age: 58
End: 2023-04-27
Payer: MEDICARE

## 2023-04-27 DIAGNOSIS — G40.911 EPILEPSY, UNSPECIFIED, INTRACTABLE, WITH STATUS EPILEPTICUS (H): ICD-10-CM

## 2023-04-27 LAB — LEVETIRACETAM SERPL-MCNC: 44.1 ΜG/ML (ref 10–40)

## 2023-04-27 PROCEDURE — 80201 ASSAY OF TOPIRAMATE: CPT | Mod: ORL | Performed by: FAMILY MEDICINE

## 2023-04-27 PROCEDURE — 36415 COLL VENOUS BLD VENIPUNCTURE: CPT | Mod: ORL | Performed by: FAMILY MEDICINE

## 2023-04-27 PROCEDURE — 80177 DRUG SCRN QUAN LEVETIRACETAM: CPT | Mod: ORL | Performed by: FAMILY MEDICINE

## 2023-04-28 LAB — TOPIRAMATE SERPL-MCNC: 14.8 UG/ML

## 2023-05-09 DIAGNOSIS — G40.319 IDIOPATHIC GENERALIZED EPILEPSY, INTRACTABLE (H): Primary | ICD-10-CM

## 2023-05-09 DIAGNOSIS — G40.319 IDIOPATHIC GENERALIZED EPILEPSY, INTRACTABLE (H): ICD-10-CM

## 2023-05-09 RX ORDER — LAMOTRIGINE 25 MG/1
75 TABLET ORAL 2 TIMES DAILY
Qty: 186 TABLET | Refills: 1 | Status: SHIPPED | OUTPATIENT
Start: 2023-05-09 | End: 2023-05-09 | Stop reason: ALTCHOICE

## 2023-05-09 RX ORDER — LAMOTRIGINE 200 MG/1
TABLET, ORALLY DISINTEGRATING ORAL
Qty: 62 TABLET | Refills: 1 | Status: SHIPPED | OUTPATIENT
Start: 2023-05-09 | End: 2023-05-24

## 2023-05-09 RX ORDER — TOPIRAMATE 200 MG/1
TABLET, FILM COATED ORAL
Qty: 62 TABLET | Refills: 1 | Status: SHIPPED | OUTPATIENT
Start: 2023-05-09 | End: 2023-05-24

## 2023-05-09 NOTE — TELEPHONE ENCOUNTER
Health Call Center    Phone Message    May a detailed message be left on voicemail: yes     Reason for Call: Medication Question or concern regarding medication   Prescription Clarification  Name of Medication: lamoTRIgine (LAMICTAL) 25 MG tablet  Prescribing Provider: Dr. Forman   Pharmacy: [facility]. [facility] [id number] [facility]   What on the order needs clarification?  Pharmacist is wondering if he can change the lamoTRIgine (LAMICTAL) 25 MG tablet to the chewable form? Pt has had the chewable in the past and may be easier to crush for the G tube.    Please follow up with pharmacy [phone]      Action Taken: Message routed to: [facility]    Travel Screening: Not Applicable    Zhang Benavides on 5/9/2023 at 4:14 PM   - Neurology

## 2023-05-09 NOTE — TELEPHONE ENCOUNTER
Refill request for:   lamoTRIgine (LAMICTAL) 200 MG tablet- TAKE 1 TABLET PER G-TUBE TWICE DAILY  lamoTRIgine (LAMICTAL) 25 MG tablet-Take 3 tablets (75 mg) by mouth 2 times daily Per G-tube  topiramate (TOPAMAX) 200 MG tablet- 1 tablet (200 mg) by Per G Tube route 2 times daily    LOV: 4/27/22  NOV: 5/24/23    30 day supply with 1 refills Medication T'd for review and signature  Agatha Lawler CMA on 5/9/2023 at 2:34 PM

## 2023-05-09 NOTE — TELEPHONE ENCOUNTER
Request to change lamotrigine 25mg tablet to 25mg chewable tablet  Medication T'd for review and signature  Agatha Lawler CMA on 5/9/2023 at 4:20 PM

## 2023-05-10 RX ORDER — LAMOTRIGINE 25 MG/1
75 TABLET, CHEWABLE ORAL 2 TIMES DAILY
Qty: 186 TABLET | Refills: 1 | Status: SHIPPED | OUTPATIENT
Start: 2023-05-10 | End: 2023-05-24

## 2023-05-24 ENCOUNTER — OFFICE VISIT (OUTPATIENT)
Dept: NEUROLOGY | Facility: CLINIC | Age: 58
End: 2023-05-24
Payer: MEDICARE

## 2023-05-24 VITALS — WEIGHT: 97 LBS | BODY MASS INDEX: 19.56 KG/M2 | HEIGHT: 59 IN

## 2023-05-24 DIAGNOSIS — G40.319 IDIOPATHIC GENERALIZED EPILEPSY, INTRACTABLE (H): ICD-10-CM

## 2023-05-24 PROCEDURE — 99214 OFFICE O/P EST MOD 30 MIN: CPT

## 2023-05-24 RX ORDER — TOPIRAMATE 200 MG/1
TABLET, FILM COATED ORAL
Qty: 186 TABLET | Refills: 3 | Status: SHIPPED | OUTPATIENT
Start: 2023-05-24 | End: 2024-05-20

## 2023-05-24 RX ORDER — LEVETIRACETAM 100 MG/ML
SOLUTION ORAL
Qty: 600 ML | Refills: 11 | Status: SHIPPED | OUTPATIENT
Start: 2023-05-24 | End: 2024-05-20

## 2023-05-24 RX ORDER — LAMOTRIGINE 200 MG/1
TABLET, ORALLY DISINTEGRATING ORAL
Qty: 186 TABLET | Refills: 3 | Status: SHIPPED | OUTPATIENT
Start: 2023-05-24

## 2023-05-24 RX ORDER — LAMOTRIGINE 25 MG/1
75 TABLET, CHEWABLE ORAL 2 TIMES DAILY
Qty: 540 TABLET | Refills: 3 | Status: SHIPPED | OUTPATIENT
Start: 2023-05-24 | End: 2024-05-20

## 2023-05-24 NOTE — PATIENT INSTRUCTIONS
Plan:  --- Continue Lamictal-275 mg twice daily  --- Continue Keppra-1000 mg twice daily  --- Continue Topamax 200 mg twice daily    --- Labs: Drug levels and CMP  --- Take your medications as prescribed, and do not stop taking abruptly.  --- Try to take your anti-seizure medications at the same time every day  --- Avoid common triggers: sleep deprivation, excessive alcohol, stress, flashing lights or patterns, dehydration, recreational drug use, illness and missed medications.  --- Plan on follow up in the Neurology Clinic in 12 months.  --- Please feel free to reach out if you have any further questions or concerns.  --- Seek immediate medical attention if an emergency arises or if your health becomes progressively worse.     It was a pleasure to see you today!

## 2023-05-24 NOTE — LETTER
5/24/2023         RE: Elysia Arellano  [address] [facility] [address]        Dear Colleague,    Thank you for referring your patient, Elysia Arellano, to the [facility] [place]. Please see a copy of my visit note below.      __________________________________  ESTABLISHED PATIENT NEUROLOGY NOTE    DATE OF VISIT: 4/24/2023  MRN: [id number]  PATIENT NAME: Elysia Arellano  YOB: 1965    Chief Complaint   Patient presents with     Epilepsy     No concerns     SUBJECTIVE:                                                      HISTORY OF PRESENT ILLNESS:  Elysia is here for follow up regarding seizures    Elysia Arellano is a 57-year-old female with mental retardation, cerebral palsy, intractable epilepsy, resident of a group home who returns for yearly follow-up for her seizure disorder.  She follows with Dr. Forman in the clinic last seen 4/27/2022.  Per chart review, she had remained seizure-free.  She was continued on Keppra, lamotrigine and Topamax and I had recommended checking blood levels that were not done.  Recently lamotrigine level was done that was within normal limits but Topamax and Keppra level were done but a year ago and was therapeutic.  According to caregiver there has been no significant change.  She has not experienced any seizures.  She is wheelchair-bound and minimally communicative.    05/24/23: Elysia presents to the clinic for annual epilepsy follow-up.  She is accompanied by, Tamie, her group home nurse. Elysia is nonverbal and Tamie speaks on her behalf.  He states that she has remained seizure-free.  He denies any loss of consciousness or seizure-like activity.  He reports that she is taking her medications as prescribed and tolerating well.  She is also taking vitamin D and calcium.  No other concerns at this time       Current Anti-Seizure Medications:    Lamictal-275 mg twice daily  Keppra-1000 mg twice daily  Topamax 200 mg twice  daily    Perceived AED Side Effects: No    Medication Notes:   AED Medication Compliance:  compliant administered by nurse at custodial through PEG tube     Psycho-Social History: Elysia Arellano currently lives [facility]. 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      Current Medications:   acetaminophen (TYLENOL) 325 MG tablet, Take 650 mg by mouth every 4 hours as needed for mild pain As needed for pain, discomfort, and fever.  Not to exceed 3000 mg in 24 hours.  arginine (ARGINAID) PACK, Take 1 packet by mouth 2 times daily Per G-tube mixed with 4 ounces of water  bisacodyl (DULCOLAX) 10 MG suppository, Place 10 mg rectally every other day  calcium carbonate 1250 MG/5ML SUSP suspension, Take 600 mg by mouth daily Per G-tube  carboxymethylcellulose PF (REFRESH PLUS) 0.5 % SOLN ophthalmic solution, 1 drop At Bedtime  desonide (DESOWEN) 0.05 % external cream, Apply topically 2 times daily .  Hold if no red, itchy, scaly areas on face.  Dextromethorphan-guaiFENesin  MG/5ML syrup, Take 10 mLs by mouth every 6 hours as needed for cough  diazepam (VALIUM) 5 MG/ML (HIGH CONC) solution, Take 5 mg by mouth every 8 hours as needed for anxiety  diphenhydrAMINE (BENADRYL) 12.5 MG/5ML solution, Take 25 mg by mouth every 4 hours as needed for sleep   folic acid (FOLVITE) 1 MG tablet, Take 1 mg by mouth daily Per G- tube  hydrocortisone (CORTAID) 1 % external cream, Apply topically 2 times daily as needed To affected area of abdomen  levothyroxine (SYNTHROID/LEVOTHROID) 50 MCG tablet, Take 50 mcg by mouth daily Per G-tube  multivitamin w/minerals (THERA-VIT-M) tablet, Take 1 tablet by mouth daily Per G-tube  nitroFURantoin (FURADANTIN) 25 MG/5ML suspension, Take 100 mg by mouth 4 times daily Per G-tube  nystatin (MYCOSTATIN) 988114 UNIT/GM external cream, Apply topically 3 times daily as needed for dry skin To groin, and two  times daily to G-tube site  polyethylene glycol (MIRALAX/GLYCOLAX) packet, Take 1 packet by mouth 2 times daily as needed for constipation Per G-tube, mix with 150 ml of water  protein modular, BENEPROTEIN, PACK, 7 g by Per Feeding Tube route 2 times daily  Psyllium (GENFIBER PO), Take 2 Tablespoonful by mouth daily Per G-tube with 4 ounces of water flush  rectal diazepam KIT, 1 kit once as needed for medication administration 10 mg rectally once for seizure lasting over 5 minutes.  Sodium Phosphates (FLEET ENEMA RE), Place rectally every other day If no results from suppository  VITAMIN D (CHOLECALCIFEROL) PO, Take 400 Units by mouth daily 2.5ml qd  clonazePAM (KLONOPIN) 0.5 MG tablet, Take 0.25 mg by mouth At Bedtime Per G-tube (Patient not taking: Reported on 4/27/2022)    No current facility-administered medications on file prior to visit.    Past Medical History:   Patient  has a past medical history of Cerebral palsy (H), Depression, GERD (gastroesophageal reflux disease), Hyperopia, Mental retardation, Osteoporosis, Pneumonia due to 2019 novel coronavirus (8/7/2020), Pyelonephritis, Scoliosis, Seizure disorder (H), and UTI (urinary tract infection).  Surgical History:  She  has a past surgical history that includes IR Gastro Jejunostomy Tube Placement; Scoliosis S/P Lewis Valentín Placement; IR Miscellaneous Procedure (6/19/2000); IR Abscess Tube Change (6/27/2000); IR Abscess Tube Change (9/5/2000); IR Abscess Tube Change (3/15/2001); IR Abscess Tube Change (7/6/2001); IR Abscess Tube Change (9/4/2001); IR Miscellaneous Procedure (12/22/2001); IR Miscellaneous Procedure (1/7/2002); IR Miscellaneous Procedure (1/19/2002); IR Miscellaneous Procedure (1/19/2002); IR Miscellaneous Procedure (2/20/2002); IR Miscellaneous Procedure (2/20/2002); IR Miscellaneous Procedure (3/19/2002); IR Miscellaneous Procedure (4/16/2002); IR Miscellaneous Procedure (7/11/2002); IR Miscellaneous Procedure (7/11/2002); IR  Gastrostomy Tube Change (7/18/2002); IR Miscellaneous Procedure (7/30/2002); IR Miscellaneous Procedure (8/16/2002); IR Miscellaneous Procedure (8/31/2002); IR Miscellaneous Procedure (9/18/2002); IR Miscellaneous Procedure (3/1/2003); IR Miscellaneous Procedure (8/5/2003); IR Miscellaneous Procedure (1/29/2004); IR Miscellaneous Procedure (3/18/2004); IR Miscellaneous Procedure (7/21/2004); IR Miscellaneous Procedure (11/15/2004); IR Miscellaneous Procedure (2/18/2005); IR Miscellaneous Procedure (4/8/2005); IR Miscellaneous Procedure (6/30/2005); IR Miscellaneous Procedure (9/30/2005); IR Miscellaneous Procedure (12/20/2005); IR Miscellaneous Procedure (3/28/2006); IR Miscellaneous Procedure (6/30/2006); IR Miscellaneous Procedure (10/30/2006); IR Miscellaneous Procedure (2/28/2007); IR Miscellaneous Procedure (6/19/2007); IR Miscellaneous Procedure (9/25/2007); IR Miscellaneous Procedure (12/10/2007); IR Miscellaneous Procedure (1/5/2008); IR Miscellaneous Procedure (6/18/2008); IR Miscellaneous Procedure (8/25/2008); IR Miscellaneous Procedure (12/18/2008); IR Miscellaneous Procedure (2/18/2009); IR Miscellaneous Procedure (8/4/2009); IR Gastro Jejunostomy Tube Change (8/30/2009); IR Miscellaneous Procedure (11/6/2009); IR Miscellaneous Procedure (12/28/2009); IR Miscellaneous Procedure (4/13/2010); IR Miscellaneous Procedure (6/29/2010); IR Miscellaneous Procedure (10/11/2010); IR Miscellaneous Procedure (1/3/2011); IR Miscellaneous Procedure (2/25/2011); IR Miscellaneous Procedure (5/26/2011); IR Miscellaneous Procedure (8/23/2011); IR Miscellaneous Procedure (11/17/2011); IR Miscellaneous Procedure (2/15/2012); IR Miscellaneous Procedure (5/18/2012); IR Miscellaneous Procedure (8/16/2012); IR Miscellaneous Procedure (10/25/2012); IR Miscellaneous Procedure (1/14/2013); IR Miscellaneous Procedure (4/19/2013); IR Miscellaneous Procedure (7/24/2013); IR Miscellaneous Procedure (10/24/2013); IR Miscellaneous  Procedure (12/23/2013); IR Miscellaneous Procedure (4/3/2014); IR Miscellaneous Procedure (5/20/2014); IR Gastro Jejunostomy Tube Change (8/5/2014); IR Gastro Jejunostomy Tube Change (11/5/2014); IR Gastro Jejunostomy Tube Change (2/18/2015); IR Gastro Jejunostomy Tube Change (5/18/2015); IR Gastro Jejunostomy Tube Change (8/17/2015); IR Gastro Jejunostomy Tube Change (10/28/2015); IR Gastro Jejunostomy Tube Change (1/16/2016); IR Gastro Jejunostomy Tube Change (4/14/2016); IR Gastro Jejunostomy Tube Change (5/13/2016); IR Gastro Jejunostomy Tube Change (6/16/2016); IR Gastro Jejunostomy Tube Change (7/20/2016); IR Gastro Jejunostomy Tube Change (9/7/2016); IR Gastro Jejunostomy Tube Change (10/24/2016); IR Gastro Jejunostomy Tube Change (1/8/2017); IR Gastro Jejunostomy Tube Change (3/19/2017); IR Gastro Jejunostomy Tube Change (4/25/2017); IR Gastro Jejunostomy Tube Change (6/10/2017); IR Gastro Jejunostomy Tube Change (9/11/2017); IR Gastro Jejunostomy Tube Change (12/11/2017); IR Gastro Jejunostomy Tube Change (2/12/2018); IR Gastro Jejunostomy Tube Change (5/16/2018); IR Gastro Jejunostomy Tube Change (8/27/2018); IR Gastro Jejunostomy Tube Change (11/13/2018); IR Gastro Jejunostomy Tube Change (1/8/2019); IR Gastro Jejunostomy Tube Change (2/6/2019); IR Gastro Jejunostomy Tube Change (4/29/2019); IR Gastro Jejunostomy Tube Change (6/25/2019); IR Gastro Jejunostomy Tube Change (9/24/2019); IR Gastro Jejunostomy Tube Change (1/29/2020); IR Gastro Jejunostomy Tube Change (5/6/2020); IR Gastro Jejunostomy Tube Change (8/3/2020); IR Gastro Jejunostomy Tube Change (11/10/2020); IR Gastro Jejunostomy Tube Change (2/11/2021); IR Gastro Jejunostomy Tube Change (5/17/2021); Spinal Fusion; Ir Gj Tube Replacement (11/13/2018); Ir Gj Tube Replacement (1/8/2019); Ir Gj Tube Replacement (2/6/2019); Ir Gj Tube Replacement (4/29/2019); Ir Gj Tube Replacement (6/25/2019); Ir Gj Tube Replacement (9/24/2019); back surgery;  Amputate toe(s) (Right, 11/21/2019); Ir Gj Tube Replacement (1/29/2020); Ir Gj Tube Replacement (5/6/2020); Ir Gj Tube Replacement (8/3/2020); Ir Gj Tube Replacement (11/10/2020); Ir Gj Tube Replacement (2/11/2021); Ir Gj Tube Replacement (5/17/2021); IR Gastro Jejunostomy Tube Change (8/16/2021); IR Gastro Jejunostomy Tube Change (11/19/2021); IR Gastro Jejunostomy Tube Change (2/22/2022); IR Gastro Jejunostomy Tube Change (7/22/2022); IR Gastro Jejunostomy Tube Change (8/12/2022); IR Gastro Jejunostomy Tube Change (11/17/2022); IR Gastro Jejunostomy Tube Change (2/14/2023); and IR Gastro Jejunostomy Tube Change (4/19/2023).  Family and Social History:  Reviewed, and she  reports that she has never smoked. She has never used smokeless tobacco. She reports that she does not drink alcohol and does not use drugs.  Reviewed, and family history includes Seizure Disorder in her sister.    RECENT DIAGNOSTIC STUDIES:   Labs:  03/10/23  Lamotrigine 3.0 - 15.0 ug/mL 8.9     09/06/22  Lamotrigine 3.0 - 15.0 ug/mL 10.0   04/29/22  Keppra (Levetiracetam) Level 10 - 40 ug/mL 44 High       Topiramate 5.0 - 20.0 ug/mL 16.0     Imaging:   EXAM: CT HEAD WO CONTRAST  DATE/TIME: 11/18/2019 5:37 PM  INDICATION: Seizures  IMPRESSION:  1.  No CT finding of a mass, hemorrhage or focal area suggestive of acute infarct.  2.  Stable age related changes along with severe cerebellar atrophy.    EEG 11/18/19  Impression: This is an abnormal awake and drowsy EEG due to background slowing, most suggestive of severe compromised of brain function, due to encephalopathy or organic logan syndrome of non-specific etiology. No seizure activities detected during   study. Clinical correlation is recommended.      REVIEW OF SYSTEMS:                                                      10-point review of systems is negative except as mentioned above in HPI.    EXAM:                                                      Physical Exam:   Vitals: Ht 1.499 m  "(4' 11\")   Wt 44 kg (97 lb)   BMI 19.59 kg/m    BMI= Body mass index is 19.59 kg/m .  GENERAL: NAD.  HEENT: NC/AT.  PULM: Non-labored breathing.   Neurologic:  MENTAL STATUS: Alert.  Nonverbal.  Unable to follow commands.   MOTOR: Able to move all extremities  STATION AND GAIT: Wheelchair-bound     ASSESSMENT and PLAN:                                                      Assessment:    ICD-10-CM    1. Idiopathic generalized epilepsy, intractable (H)  G40.319 Comprehensive metabolic panel     Lamotrigine Level     Keppra (Levetiracetam) Level     Topiramate Level     lamoTRIgine (LAMICTAL) 25 MG chewable tablet     lamoTRIgine (LAMICTAL) 200 MG TBDP ODT tab     topiramate (TOPAMAX) 200 MG tablet     levETIRAcetam (KEPPRA) 100 MG/ML oral solution     diazePAM 1 MG/ML solution          Elysia Arellano is a 57-year-old female with mental retardation, cerebral palsy, intractable epilepsy, resident of a group home who returns for yearly follow-up for her seizure disorder.  She follows with Dr. Forman in the clinic last seen 4/27/2022.  Group home staff reports no seizure activity since her last visit.  She has remained seizure-free on her current AED regimen of Lamictal, Keppra and Topamax.  I will reorder labs to monitor drug parameters.  Medications refilled including rescue medication.  We discussed interventions outlined below and will plan to see the patient back in 12 months.  Group home staff, Tamie, understand and agree with this plan     Plan:  --- Continue Lamictal-275 mg twice daily  --- Continue Keppra-1000 mg twice daily  --- Continue Topamax 200 mg twice daily  --- Labs: Drug levels and CMP  --- Take your medications as prescribed, and do not stop taking abruptly.  --- Try to take your anti-seizure medications at the same time every day  --- Avoid common triggers: sleep deprivation, excessive alcohol, stress, flashing lights or patterns, dehydration, recreational drug use, illness and missed " medications.  --- Plan on follow up in the Neurology Clinic in 12 months.  --- Please feel free to reach out if you have any further questions or concerns.  --- Seek immediate medical attention if an emergency arises or if your health becomes progressively worse.     It was a pleasure to see you today!     Total Time: Total time spent for face to face visit, reviewing labs/imaging studies, counseling and coordination of care was: 30 Minutes spent on the date of the encounter doing chart review, review of test results, patient visit, documentation and discussion with family       This note was dictated using voice recognition software.  Any grammatical or context distortions are unintentional and inherent to the software.    Onelia Petersen, MANJINDER, APRN, CNP  [facility]                           Again, thank you for allowing me to participate in the care of your patient.        Sincerely,        TESFAYE Sharpe CNP

## 2023-05-24 NOTE — NURSING NOTE
Chief Complaint   Patient presents with     Epilepsy     No concerns     Yudith Mena on 5/24/2023 at 1:52 PM

## 2023-05-24 NOTE — PROGRESS NOTES
__________________________________  ESTABLISHED PATIENT NEUROLOGY NOTE    DATE OF VISIT: 4/24/2023  MRN: [id number]  PATIENT NAME: Elysia Arellano  YOB: 1965    Chief Complaint   Patient presents with     Epilepsy     No concerns     SUBJECTIVE:                                                      HISTORY OF PRESENT ILLNESS:  Elysia is here for follow up regarding seizures    Elysia Arellano is a 57-year-old female with mental retardation, cerebral palsy, intractable epilepsy, resident of a group home who returns for yearly follow-up for her seizure disorder.  She follows with Dr. Forman in the clinic last seen 4/27/2022.  Per chart review, she had remained seizure-free.  She was continued on Keppra, lamotrigine and Topamax and I had recommended checking blood levels that were not done.  Recently lamotrigine level was done that was within normal limits but Topamax and Keppra level were done but a year ago and was therapeutic.  According to caregiver there has been no significant change.  She has not experienced any seizures.  She is wheelchair-bound and minimally communicative.    05/24/23: Elysia presents to the clinic for annual epilepsy follow-up.  She is accompanied by, Tamie, her group [place] nurse. Elysia is nonverbal and Tamie speaks on her behalf.  He states that she has remained seizure-free.  He denies any loss of consciousness or seizure-like activity.  He reports that she is taking her medications as prescribed and tolerating well.  She is also taking vitamin D and calcium.  No other concerns at this time       Current Anti-Seizure Medications:    Lamictal-275 mg twice daily  Keppra-1000 mg twice daily  Topamax 200 mg twice daily    Perceived AED Side Effects: No    Medication Notes:   AED Medication Compliance:  compliant administered by nurse at [facility] through PEG tube     Psycho-Social History: Elysia Arellano currently lives [facility]. 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      Current Medications:   acetaminophen (TYLENOL) 325 MG tablet, Take 650 mg by mouth every 4 hours as needed for mild pain As needed for pain, discomfort, and fever.  Not to exceed 3000 mg in 24 hours.  arginine (ARGINAID) PACK, Take 1 packet by mouth 2 times daily Per G-tube mixed with 4 ounces of water  bisacodyl (DULCOLAX) 10 MG suppository, Place 10 mg rectally every other day  calcium carbonate 1250 MG/5ML SUSP suspension, Take 600 mg by mouth daily Per G-tube  carboxymethylcellulose PF (REFRESH PLUS) 0.5 % SOLN ophthalmic solution, 1 drop At Bedtime  desonide (DESOWEN) 0.05 % external cream, Apply topically 2 times daily .  Hold if no red, itchy, scaly areas on face.  Dextromethorphan-guaiFENesin  MG/5ML syrup, Take 10 mLs by mouth every 6 hours as needed for cough  diazepam (VALIUM) 5 MG/ML (HIGH CONC) solution, Take 5 mg by mouth every 8 hours as needed for anxiety  diphenhydrAMINE (BENADRYL) 12.5 MG/5ML solution, Take 25 mg by mouth every 4 hours as needed for sleep   folic acid (FOLVITE) 1 MG tablet, Take 1 mg by mouth daily Per G- tube  hydrocortisone (CORTAID) 1 % external cream, Apply topically 2 times daily as needed To affected area of abdomen  levothyroxine (SYNTHROID/LEVOTHROID) 50 MCG tablet, Take 50 mcg by mouth daily Per G-tube  multivitamin w/minerals (THERA-VIT-M) tablet, Take 1 tablet by mouth daily Per G-tube  nitroFURantoin (FURADANTIN) 25 MG/5ML suspension, Take 100 mg by mouth 4 times daily Per G-tube  nystatin (MYCOSTATIN) 429071 UNIT/GM external cream, Apply topically 3 times daily as needed for dry skin To groin, and two times daily to G-tube site  polyethylene glycol (MIRALAX/GLYCOLAX) packet, Take 1 packet by mouth 2 times daily as needed for constipation Per G-tube, mix with 150 ml of water  protein modular, BENEPROTEIN, PACK, 7 g by Per Feeding Tube route 2 times daily  Psyllium  (GENFIBER PO), Take 2 Tablespoonful by mouth daily Per G-tube with 4 ounces of water flush  rectal diazepam KIT, 1 kit once as needed for medication administration 10 mg rectally once for seizure lasting over 5 minutes.  Sodium Phosphates (FLEET ENEMA RE), Place rectally every other day If no results from suppository  VITAMIN D (CHOLECALCIFEROL) PO, Take 400 Units by mouth daily 2.5ml qd  clonazePAM (KLONOPIN) 0.5 MG tablet, Take 0.25 mg by mouth At Bedtime Per G-tube (Patient not taking: Reported on 4/27/2022)    No current facility-administered medications on file prior to visit.    Past Medical History:   Patient  has a past medical history of Cerebral palsy (H), Depression, GERD (gastroesophageal reflux disease), Hyperopia, Mental retardation, Osteoporosis, Pneumonia due to 2019 novel coronavirus (8/7/2020), Pyelonephritis, Scoliosis, Seizure disorder (H), and UTI (urinary tract infection).  Surgical History:  She  has a past surgical history that includes IR Gastro Jejunostomy Tube Placement; Scoliosis S/P Lewis Valentín Placement; IR Miscellaneous Procedure (6/19/2000); IR Abscess Tube Change (6/27/2000); IR Abscess Tube Change (9/5/2000); IR Abscess Tube Change (3/15/2001); IR Abscess Tube Change (7/6/2001); IR Abscess Tube Change (9/4/2001); IR Miscellaneous Procedure (12/22/2001); IR Miscellaneous Procedure (1/7/2002); IR Miscellaneous Procedure (1/19/2002); IR Miscellaneous Procedure (1/19/2002); IR Miscellaneous Procedure (2/20/2002); IR Miscellaneous Procedure (2/20/2002); IR Miscellaneous Procedure (3/19/2002); IR Miscellaneous Procedure (4/16/2002); IR Miscellaneous Procedure (7/11/2002); IR Miscellaneous Procedure (7/11/2002); IR Gastrostomy Tube Change (7/18/2002); IR Miscellaneous Procedure (7/30/2002); IR Miscellaneous Procedure (8/16/2002); IR Miscellaneous Procedure (8/31/2002); IR Miscellaneous Procedure (9/18/2002); IR Miscellaneous Procedure (3/1/2003); IR Miscellaneous Procedure (8/5/2003);  IR Miscellaneous Procedure (1/29/2004); IR Miscellaneous Procedure (3/18/2004); IR Miscellaneous Procedure (7/21/2004); IR Miscellaneous Procedure (11/15/2004); IR Miscellaneous Procedure (2/18/2005); IR Miscellaneous Procedure (4/8/2005); IR Miscellaneous Procedure (6/30/2005); IR Miscellaneous Procedure (9/30/2005); IR Miscellaneous Procedure (12/20/2005); IR Miscellaneous Procedure (3/28/2006); IR Miscellaneous Procedure (6/30/2006); IR Miscellaneous Procedure (10/30/2006); IR Miscellaneous Procedure (2/28/2007); IR Miscellaneous Procedure (6/19/2007); IR Miscellaneous Procedure (9/25/2007); IR Miscellaneous Procedure (12/10/2007); IR Miscellaneous Procedure (1/5/2008); IR Miscellaneous Procedure (6/18/2008); IR Miscellaneous Procedure (8/25/2008); IR Miscellaneous Procedure (12/18/2008); IR Miscellaneous Procedure (2/18/2009); IR Miscellaneous Procedure (8/4/2009); IR Gastro Jejunostomy Tube Change (8/30/2009); IR Miscellaneous Procedure (11/6/2009); IR Miscellaneous Procedure (12/28/2009); IR Miscellaneous Procedure (4/13/2010); IR Miscellaneous Procedure (6/29/2010); IR Miscellaneous Procedure (10/11/2010); IR Miscellaneous Procedure (1/3/2011); IR Miscellaneous Procedure (2/25/2011); IR Miscellaneous Procedure (5/26/2011); IR Miscellaneous Procedure (8/23/2011); IR Miscellaneous Procedure (11/17/2011); IR Miscellaneous Procedure (2/15/2012); IR Miscellaneous Procedure (5/18/2012); IR Miscellaneous Procedure (8/16/2012); IR Miscellaneous Procedure (10/25/2012); IR Miscellaneous Procedure (1/14/2013); IR Miscellaneous Procedure (4/19/2013); IR Miscellaneous Procedure (7/24/2013); IR Miscellaneous Procedure (10/24/2013); IR Miscellaneous Procedure (12/23/2013); IR Miscellaneous Procedure (4/3/2014); IR Miscellaneous Procedure (5/20/2014); IR Gastro Jejunostomy Tube Change (8/5/2014); IR Gastro Jejunostomy Tube Change (11/5/2014); IR Gastro Jejunostomy Tube Change (2/18/2015); IR Gastro Jejunostomy Tube Change  (5/18/2015); IR Gastro Jejunostomy Tube Change (8/17/2015); IR Gastro Jejunostomy Tube Change (10/28/2015); IR Gastro Jejunostomy Tube Change (1/16/2016); IR Gastro Jejunostomy Tube Change (4/14/2016); IR Gastro Jejunostomy Tube Change (5/13/2016); IR Gastro Jejunostomy Tube Change (6/16/2016); IR Gastro Jejunostomy Tube Change (7/20/2016); IR Gastro Jejunostomy Tube Change (9/7/2016); IR Gastro Jejunostomy Tube Change (10/24/2016); IR Gastro Jejunostomy Tube Change (1/8/2017); IR Gastro Jejunostomy Tube Change (3/19/2017); IR Gastro Jejunostomy Tube Change (4/25/2017); IR Gastro Jejunostomy Tube Change (6/10/2017); IR Gastro Jejunostomy Tube Change (9/11/2017); IR Gastro Jejunostomy Tube Change (12/11/2017); IR Gastro Jejunostomy Tube Change (2/12/2018); IR Gastro Jejunostomy Tube Change (5/16/2018); IR Gastro Jejunostomy Tube Change (8/27/2018); IR Gastro Jejunostomy Tube Change (11/13/2018); IR Gastro Jejunostomy Tube Change (1/8/2019); IR Gastro Jejunostomy Tube Change (2/6/2019); IR Gastro Jejunostomy Tube Change (4/29/2019); IR Gastro Jejunostomy Tube Change (6/25/2019); IR Gastro Jejunostomy Tube Change (9/24/2019); IR Gastro Jejunostomy Tube Change (1/29/2020); IR Gastro Jejunostomy Tube Change (5/6/2020); IR Gastro Jejunostomy Tube Change (8/3/2020); IR Gastro Jejunostomy Tube Change (11/10/2020); IR Gastro Jejunostomy Tube Change (2/11/2021); IR Gastro Jejunostomy Tube Change (5/17/2021); Spinal Fusion; Ir Gj Tube Replacement (11/13/2018); Ir Gj Tube Replacement (1/8/2019); Ir Gj Tube Replacement (2/6/2019); Ir Gj Tube Replacement (4/29/2019); Ir Gj Tube Replacement (6/25/2019); Ir Gj Tube Replacement (9/24/2019); back surgery; Amputate toe(s) (Right, 11/21/2019); Ir Gj Tube Replacement (1/29/2020); Ir Gj Tube Replacement (5/6/2020); Ir Gj Tube Replacement (8/3/2020); Ir Gj Tube Replacement (11/10/2020); Ir Gj Tube Replacement (2/11/2021); Ir Gj Tube Replacement (5/17/2021); IR Gastro Jejunostomy Tube  "Change (8/16/2021); IR Gastro Jejunostomy Tube Change (11/19/2021); IR Gastro Jejunostomy Tube Change (2/22/2022); IR Gastro Jejunostomy Tube Change (7/22/2022); IR Gastro Jejunostomy Tube Change (8/12/2022); IR Gastro Jejunostomy Tube Change (11/17/2022); IR Gastro Jejunostomy Tube Change (2/14/2023); and IR Gastro Jejunostomy Tube Change (4/19/2023).  Family and Social History:  Reviewed, and she  reports that she has never smoked. She has never used smokeless tobacco. She reports that she does not drink alcohol and does not use drugs.  Reviewed, and family history includes Seizure Disorder in her sister.    RECENT DIAGNOSTIC STUDIES:   Labs:  03/10/23  Lamotrigine 3.0 - 15.0 ug/mL 8.9     09/06/22  Lamotrigine 3.0 - 15.0 ug/mL 10.0   04/29/22  Keppra (Levetiracetam) Level 10 - 40 ug/mL 44 High       Topiramate 5.0 - 20.0 ug/mL 16.0     Imaging:   EXAM: CT HEAD WO CONTRAST  DATE/TIME: 11/18/2019 5:37 PM  INDICATION: Seizures  IMPRESSION:  1.  No CT finding of a mass, hemorrhage or focal area suggestive of acute infarct.  2.  Stable age related changes along with severe cerebellar atrophy.    EEG 11/18/19  Impression: This is an abnormal awake and drowsy EEG due to background slowing, most suggestive of severe compromised of brain function, due to encephalopathy or organic logan syndrome of non-specific etiology. No seizure activities detected during   study. Clinical correlation is recommended.      REVIEW OF SYSTEMS:                                                      10-point review of systems is negative except as mentioned above in HPI.    EXAM:                                                      Physical Exam:   Vitals: Ht 1.499 m (4' 11\")   Wt 44 kg (97 lb)   BMI 19.59 kg/m    BMI= Body mass index is 19.59 kg/m .  GENERAL: NAD.  HEENT: NC/AT.  PULM: Non-labored breathing.   Neurologic:  MENTAL STATUS: Alert.  Nonverbal.  Unable to follow commands.   MOTOR: Able to move all extremities  STATION AND GAIT: " Wheelchair-bound     ASSESSMENT and PLAN:                                                      Assessment:    ICD-10-CM    1. Idiopathic generalized epilepsy, intractable (H)  G40.319 Comprehensive metabolic panel     Lamotrigine Level     Keppra (Levetiracetam) Level     Topiramate Level     lamoTRIgine (LAMICTAL) 25 MG chewable tablet     lamoTRIgine (LAMICTAL) 200 MG TBDP ODT tab     topiramate (TOPAMAX) 200 MG tablet     levETIRAcetam (KEPPRA) 100 MG/ML oral solution     diazePAM 1 MG/ML solution          Elysia Arellano is a 57-year-old female with mental retardation, cerebral palsy, intractable epilepsy, resident of a group home who returns for yearly follow-up for her seizure disorder.  She follows with Dr. Forman in the clinic last seen 4/27/2022.  Group home staff reports no seizure activity since her last visit.  She has remained seizure-free on her current AED regimen of Lamictal, Keppra and Topamax.  I will reorder labs to monitor drug parameters.  Medications refilled including rescue medication.  We discussed interventions outlined below and will plan to see the patient back in 12 months.  Group home staff, Tamie, understand and agree with this plan     Plan:  --- Continue Lamictal-275 mg twice daily  --- Continue Keppra-1000 mg twice daily  --- Continue Topamax 200 mg twice daily  --- Labs: Drug levels and CMP  --- Take your medications as prescribed, and do not stop taking abruptly.  --- Try to take your anti-seizure medications at the same time every day  --- Avoid common triggers: sleep deprivation, excessive alcohol, stress, flashing lights or patterns, dehydration, recreational drug use, illness and missed medications.  --- Plan on follow up in the Neurology Clinic in 12 months.  --- Please feel free to reach out if you have any further questions or concerns.  --- Seek immediate medical attention if an emergency arises or if your health becomes progressively worse.     It was a pleasure to see  you today!     Total Time: Total time spent for face to face visit, reviewing labs/imaging studies, counseling and coordination of care was: 30 Minutes spent on the date of the encounter doing chart review, review of test results, patient visit, documentation and discussion with family       This note was dictated using voice recognition software.  Any grammatical or context distortions are unintentional and inherent to the software.    Onelia Petersen, DNP, APRN, CNP  [facility]

## 2023-05-25 ENCOUNTER — LAB (OUTPATIENT)
Dept: LAB | Facility: HOSPITAL | Age: 58
End: 2023-05-25
Payer: MEDICARE

## 2023-05-25 DIAGNOSIS — G40.319 IDIOPATHIC GENERALIZED EPILEPSY, INTRACTABLE (H): ICD-10-CM

## 2023-05-25 LAB
ALBUMIN SERPL BCG-MCNC: 3.9 G/DL (ref 3.5–5.2)
ALP SERPL-CCNC: 231 U/L (ref 35–104)
ALT SERPL W P-5'-P-CCNC: 8 U/L (ref 10–35)
ANION GAP SERPL CALCULATED.3IONS-SCNC: 11 MMOL/L (ref 7–15)
AST SERPL W P-5'-P-CCNC: 24 U/L (ref 10–35)
BILIRUB SERPL-MCNC: 0.2 MG/DL
BUN SERPL-MCNC: 24.7 MG/DL (ref 6–20)
CALCIUM SERPL-MCNC: 9.6 MG/DL (ref 8.6–10)
CHLORIDE SERPL-SCNC: 102 MMOL/L (ref 98–107)
CREAT SERPL-MCNC: 0.35 MG/DL (ref 0.51–0.95)
DEPRECATED HCO3 PLAS-SCNC: 24 MMOL/L (ref 22–29)
GFR SERPL CREATININE-BSD FRML MDRD: >90 ML/MIN/1.73M2
GLUCOSE SERPL-MCNC: 107 MG/DL (ref 70–99)
LEVETIRACETAM SERPL-MCNC: 46.9 ΜG/ML (ref 10–40)
POTASSIUM SERPL-SCNC: 3.7 MMOL/L (ref 3.4–5.3)
PROT SERPL-MCNC: 7 G/DL (ref 6.4–8.3)
SODIUM SERPL-SCNC: 137 MMOL/L (ref 136–145)

## 2023-05-25 PROCEDURE — 80053 COMPREHEN METABOLIC PANEL: CPT

## 2023-05-25 PROCEDURE — 36415 COLL VENOUS BLD VENIPUNCTURE: CPT

## 2023-05-25 PROCEDURE — 80177 DRUG SCRN QUAN LEVETIRACETAM: CPT

## 2023-05-25 PROCEDURE — 80175 DRUG SCREEN QUAN LAMOTRIGINE: CPT

## 2023-05-25 PROCEDURE — 80201 ASSAY OF TOPIRAMATE: CPT

## 2023-05-26 LAB
LAMOTRIGINE SERPL-MCNC: 9.5 UG/ML
TOPIRAMATE SERPL-MCNC: 14.5 UG/ML

## 2023-06-08 ENCOUNTER — HOSPITAL ENCOUNTER (OUTPATIENT)
Dept: INTERVENTIONAL RADIOLOGY/VASCULAR | Facility: HOSPITAL | Age: 58
Discharge: HOME | End: 2023-06-08
Attending: NURSE PRACTITIONER | Admitting: RADIOLOGY
Payer: MEDICARE

## 2023-06-08 DIAGNOSIS — R63.30 FEEDING DIFFICULTIES: ICD-10-CM

## 2023-06-08 PROCEDURE — C1769 GUIDE WIRE: HCPCS

## 2023-06-08 PROCEDURE — 49465 FLUORO EXAM OF G/COLON TUBE: CPT

## 2023-06-09 ENCOUNTER — HOSPITAL ENCOUNTER (OUTPATIENT)
Dept: INTERVENTIONAL RADIOLOGY/VASCULAR | Facility: HOSPITAL | Age: 58
Discharge: HOME | End: 2023-06-09
Attending: RADIOLOGY | Admitting: RADIOLOGY
Payer: MEDICARE

## 2023-06-09 DIAGNOSIS — R63.30 FEEDING DIFFICULTIES: Primary | ICD-10-CM

## 2023-06-09 DIAGNOSIS — E46 MALNUTRITION (H): Primary | ICD-10-CM

## 2023-06-09 DIAGNOSIS — E46 MALNUTRITION (H): ICD-10-CM

## 2023-06-09 PROCEDURE — C1769 GUIDE WIRE: HCPCS

## 2023-06-09 PROCEDURE — 49465 FLUORO EXAM OF G/COLON TUBE: CPT

## 2023-06-09 NOTE — PROVIDER NOTIFICATION
Caregiver verbalized discharge education of calling [facility] if there is an issue with the tube. Pt wheeled out in her personal wheel chair.

## 2023-06-09 NOTE — PROCEDURES
INTERVENTIONAL RADIOLOGY    Procedure:  GJ tube check    MD:  Monserrat    Complications:  None    Findings:  No problem with GJ tube. Bumper secured.    Plan:  OK to use GJ tube

## 2023-06-09 NOTE — PROVIDER NOTIFICATION
Pt arrive  due to tube being pulled out, site is red from where she is itching. Caregiver is present at her side.

## 2023-08-25 ENCOUNTER — HOSPITAL ENCOUNTER (OUTPATIENT)
Dept: INTERVENTIONAL RADIOLOGY/VASCULAR | Facility: CLINIC | Age: 58
Discharge: HOME | End: 2023-08-25
Attending: RADIOLOGY | Admitting: RADIOLOGY
Payer: MEDICARE

## 2023-08-25 VITALS
RESPIRATION RATE: 16 BRPM | HEART RATE: 82 BPM | OXYGEN SATURATION: 97 % | DIASTOLIC BLOOD PRESSURE: 74 MMHG | SYSTOLIC BLOOD PRESSURE: 132 MMHG

## 2023-08-25 DIAGNOSIS — E46 MALNUTRITION (H): Primary | ICD-10-CM

## 2023-08-25 DIAGNOSIS — E46 MALNUTRITION (H): ICD-10-CM

## 2023-08-25 DIAGNOSIS — R63.30 FEEDING DIFFICULTIES: Primary | ICD-10-CM

## 2023-08-25 PROCEDURE — C1769 GUIDE WIRE: HCPCS

## 2023-08-25 PROCEDURE — 49452 REPLACE G-J TUBE PERC: CPT

## 2023-08-25 NOTE — DISCHARGE INSTRUCTIONS
Gastrostomy (G) or Gastrojejunostomy (G/J) Tube Exchange Discharge Instructions:   You had a gastrostomy (stomach) tube or a Gastrojejunostomy Tube (stomach and jejunum) exchanged on 8/25/2023.  This tube is often used for nutritional support and medication administration or it can be used for stomach venting.     Care instructions:  - If you received sedation for your procedure, do not drive or operate heavy machinery for the rest of the day.  - Avoid soaking in stagnant water (tub baths, Jacuzzis, pools, lake, or ocean).   - You may shower beginning the day after the tube was exchanged.   - Clean under the disc daily with soap and water. Pat dry under disc and apply new split gauze dressing under disc. Cleaning tube site daily will help prevent infection and skin irritation.  - A small amount of clear tan drainage from the tube exit site can be normal.  - Make sure the disc on the tube fits slightly snug against the skin so that the tube does not move in or out of the body easily.  - Flush your tube with 60cc of water twice a day (using a cath tip syringe) to prevent tube from clogging (or follow recommendations from your doctor or dietician if given).    Giving Feedings and Medications:  - Follow-up with your dietician, primary care provider, or oncologist for instructions on tube feedings and medication administration.    - ONLY use specific enteric feedings with your tube (unless otherwise discussed with your dietitian).    - Flush tube at least twice daily with 60ml of water using cath tip syringe unless otherwise instructed by your doctor or dietician.  - Flush the tube before and after administrating medications and bolus feedings with 60cc of water.    Follow Up:  -Recommend routine 3 month exchanges of feeding tube. Please contact your primary care provider or speak with your dietitian to obtain an order to have your G tube exchanged. Then contact [facility] scheduling department  at [phone] to schedule your G tube exchange appointment.  - G tubes CANNOT be removed until 6 weeks after date of tube placement.    Please seek medical evaluation for:  - Fever (greater than 101 F (38.3C).  - Purulent (yellow/green/foul smelling) drainage from tube exit site.   - Significant or worsening abdominal pain.   - Skin that is hot to the touch or significantly reddened at the tube exit site.   - Bleeding at tube exit site.    Call [facility] at [phone] with questions or if you have any of the following symptoms:  - Tube falls out or felt to be out of position.  - Unable to flush tube.  - Significant leakage around tube site (tube feeding, medications or drainage).  - Significant bleeding at the tube exit site.  - Severe pain at tube exit site.

## 2023-08-30 ENCOUNTER — LAB REQUISITION (OUTPATIENT)
Dept: LAB | Facility: HOSPITAL | Age: 58
End: 2023-08-30
Payer: MEDICARE

## 2023-08-30 DIAGNOSIS — G40.901 EPILEPSY, UNSPECIFIED, NOT INTRACTABLE, WITH STATUS EPILEPTICUS (H): ICD-10-CM

## 2023-08-30 DIAGNOSIS — D64.9 ANEMIA, UNSPECIFIED: ICD-10-CM

## 2023-08-30 DIAGNOSIS — R56.9 UNSPECIFIED CONVULSIONS (H): ICD-10-CM

## 2023-08-30 LAB — TSH SERPL DL<=0.005 MIU/L-ACNC: 2.5 UIU/ML (ref 0.3–4.2)

## 2023-08-30 PROCEDURE — 36415 COLL VENOUS BLD VENIPUNCTURE: CPT | Mod: ORL | Performed by: FAMILY MEDICINE

## 2023-08-30 PROCEDURE — 84443 ASSAY THYROID STIM HORMONE: CPT | Mod: ORL | Performed by: FAMILY MEDICINE

## 2023-10-26 ENCOUNTER — HOSPITAL ENCOUNTER (EMERGENCY)
Facility: HOSPITAL | Age: 58
Discharge: HOME | End: 2023-10-26
Attending: EMERGENCY MEDICINE | Admitting: EMERGENCY MEDICINE
Payer: MEDICARE

## 2023-10-26 ENCOUNTER — APPOINTMENT (OUTPATIENT)
Dept: INTERVENTIONAL RADIOLOGY/VASCULAR | Facility: HOSPITAL | Age: 58
End: 2023-10-26
Payer: MEDICARE

## 2023-10-26 VITALS
RESPIRATION RATE: 20 BRPM | BODY MASS INDEX: 18.75 KG/M2 | HEART RATE: 107 BPM | WEIGHT: 93 LBS | SYSTOLIC BLOOD PRESSURE: 164 MMHG | TEMPERATURE: 97.6 F | HEIGHT: 59 IN | DIASTOLIC BLOOD PRESSURE: 70 MMHG | OXYGEN SATURATION: 97 %

## 2023-10-26 DIAGNOSIS — N30.00 ACUTE CYSTITIS WITHOUT HEMATURIA: ICD-10-CM

## 2023-10-26 DIAGNOSIS — B37.89 CANDIDA RASH OF GROIN: ICD-10-CM

## 2023-10-26 DIAGNOSIS — Z78.9 ENCOUNTER FOR GASTROJEJUNAL (GJ) TUBE PLACEMENT: Primary | ICD-10-CM

## 2023-10-26 LAB
ALBUMIN UR-MCNC: 100 MG/DL
ANION GAP SERPL CALCULATED.3IONS-SCNC: 7 MMOL/L (ref 7–15)
APPEARANCE UR: ABNORMAL
BACTERIA #/AREA URNS HPF: ABNORMAL /HPF
BASOPHILS # BLD AUTO: 0.1 10E3/UL (ref 0–0.2)
BASOPHILS NFR BLD AUTO: 1 %
BILIRUB UR QL STRIP: NEGATIVE
BUN SERPL-MCNC: 27.4 MG/DL (ref 6–20)
CALCIUM SERPL-MCNC: 9.5 MG/DL (ref 8.6–10)
CHLORIDE SERPL-SCNC: 106 MMOL/L (ref 98–107)
COLOR UR AUTO: YELLOW
CREAT SERPL-MCNC: 0.35 MG/DL (ref 0.51–0.95)
DEPRECATED HCO3 PLAS-SCNC: 28 MMOL/L (ref 22–29)
EGFRCR SERPLBLD CKD-EPI 2021: >90 ML/MIN/1.73M2
EOSINOPHIL # BLD AUTO: 0.1 10E3/UL (ref 0–0.7)
EOSINOPHIL NFR BLD AUTO: 1 %
ERYTHROCYTE [DISTWIDTH] IN BLOOD BY AUTOMATED COUNT: 13.1 % (ref 10–15)
GLUCOSE SERPL-MCNC: 90 MG/DL (ref 70–99)
GLUCOSE UR STRIP-MCNC: NEGATIVE MG/DL
HCT VFR BLD AUTO: 42 % (ref 35–47)
HGB BLD-MCNC: 13.6 G/DL (ref 11.7–15.7)
HGB UR QL STRIP: ABNORMAL
HOLD SPECIMEN: NORMAL
IMM GRANULOCYTES # BLD: 0.1 10E3/UL
IMM GRANULOCYTES NFR BLD: 1 %
KETONES UR STRIP-MCNC: NEGATIVE MG/DL
LEUKOCYTE ESTERASE UR QL STRIP: ABNORMAL
LYMPHOCYTES # BLD AUTO: 1.8 10E3/UL (ref 0.8–5.3)
LYMPHOCYTES NFR BLD AUTO: 16 %
MCH RBC QN AUTO: 32.1 PG (ref 26.5–33)
MCHC RBC AUTO-ENTMCNC: 32.4 G/DL (ref 31.5–36.5)
MCV RBC AUTO: 99 FL (ref 78–100)
MONOCYTES # BLD AUTO: 1 10E3/UL (ref 0–1.3)
MONOCYTES NFR BLD AUTO: 9 %
NEUTROPHILS # BLD AUTO: 8.4 10E3/UL (ref 1.6–8.3)
NEUTROPHILS NFR BLD AUTO: 72 %
NITRATE UR QL: POSITIVE
NRBC # BLD AUTO: 0 10E3/UL
NRBC BLD AUTO-RTO: 0 /100
PH UR STRIP: 8 [PH] (ref 5–7)
PLATELET # BLD AUTO: 268 10E3/UL (ref 150–450)
POTASSIUM SERPL-SCNC: 4 MMOL/L (ref 3.4–5.3)
RBC # BLD AUTO: 4.24 10E6/UL (ref 3.8–5.2)
RBC URINE: 5 /HPF
SODIUM SERPL-SCNC: 141 MMOL/L (ref 135–145)
SP GR UR STRIP: 1.02 (ref 1–1.03)
SQUAMOUS EPITHELIAL: 104 /HPF
UROBILINOGEN UR STRIP-MCNC: <2 MG/DL
WBC # BLD AUTO: 11.4 10E3/UL (ref 4–11)
WBC URINE: 99 /HPF

## 2023-10-26 PROCEDURE — 250N000011 HC RX IP 250 OP 636: Mod: JZ | Performed by: EMERGENCY MEDICINE

## 2023-10-26 PROCEDURE — 96365 THER/PROPH/DIAG IV INF INIT: CPT | Mod: 59

## 2023-10-26 PROCEDURE — 96375 TX/PRO/DX INJ NEW DRUG ADDON: CPT | Mod: 59

## 2023-10-26 PROCEDURE — 85025 COMPLETE CBC W/AUTO DIFF WBC: CPT | Performed by: EMERGENCY MEDICINE

## 2023-10-26 PROCEDURE — C1769 GUIDE WIRE: HCPCS

## 2023-10-26 PROCEDURE — 96361 HYDRATE IV INFUSION ADD-ON: CPT | Mod: 59

## 2023-10-26 PROCEDURE — 250N000013 HC RX MED GY IP 250 OP 250 PS 637: Performed by: EMERGENCY MEDICINE

## 2023-10-26 PROCEDURE — 49452 REPLACE G-J TUBE PERC: CPT

## 2023-10-26 PROCEDURE — 36415 COLL VENOUS BLD VENIPUNCTURE: CPT | Performed by: EMERGENCY MEDICINE

## 2023-10-26 PROCEDURE — 87086 URINE CULTURE/COLONY COUNT: CPT | Performed by: EMERGENCY MEDICINE

## 2023-10-26 PROCEDURE — 258N000003 HC RX IP 258 OP 636: Performed by: EMERGENCY MEDICINE

## 2023-10-26 PROCEDURE — 99285 EMERGENCY DEPT VISIT HI MDM: CPT | Mod: 25

## 2023-10-26 PROCEDURE — 80048 BASIC METABOLIC PNL TOTAL CA: CPT | Performed by: EMERGENCY MEDICINE

## 2023-10-26 PROCEDURE — 255N000002 HC RX 255 OP 636: Performed by: EMERGENCY MEDICINE

## 2023-10-26 PROCEDURE — 81001 URINALYSIS AUTO W/SCOPE: CPT | Performed by: EMERGENCY MEDICINE

## 2023-10-26 RX ORDER — NYSTATIN 100000 U/G
CREAM TOPICAL ONCE
Status: COMPLETED | OUTPATIENT
Start: 2023-10-26 | End: 2023-10-26

## 2023-10-26 RX ORDER — CEFTRIAXONE 1 G/1
1 INJECTION, POWDER, FOR SOLUTION INTRAMUSCULAR; INTRAVENOUS ONCE
Status: COMPLETED | OUTPATIENT
Start: 2023-10-26 | End: 2023-10-26

## 2023-10-26 RX ORDER — CEFDINIR 250 MG/5ML
14 POWDER, FOR SUSPENSION ORAL DAILY
Qty: 120 ML | Refills: 0 | Status: SHIPPED | OUTPATIENT
Start: 2023-10-26 | End: 2023-11-05

## 2023-10-26 RX ORDER — NYSTATIN 100000 U/G
CREAM TOPICAL 3 TIMES DAILY PRN
Qty: 60 G | Refills: 0 | Status: SHIPPED | OUTPATIENT
Start: 2023-10-26

## 2023-10-26 RX ORDER — DIPHENHYDRAMINE HYDROCHLORIDE 50 MG/ML
12.5 INJECTION INTRAMUSCULAR; INTRAVENOUS ONCE
Status: COMPLETED | OUTPATIENT
Start: 2023-10-26 | End: 2023-10-26

## 2023-10-26 RX ADMIN — CEFTRIAXONE SODIUM 1 G: 1 INJECTION, POWDER, FOR SOLUTION INTRAMUSCULAR; INTRAVENOUS at 15:53

## 2023-10-26 RX ADMIN — NYSTATIN: 100000 CREAM TOPICAL at 13:17

## 2023-10-26 RX ADMIN — DIPHENHYDRAMINE HYDROCHLORIDE 12.5 MG: 50 INJECTION, SOLUTION INTRAMUSCULAR; INTRAVENOUS at 15:48

## 2023-10-26 RX ADMIN — SODIUM CHLORIDE 1000 ML: 9 INJECTION, SOLUTION INTRAVENOUS at 13:14

## 2023-10-26 RX ADMIN — IOHEXOL 20 ML: 350 INJECTION, SOLUTION INTRAVENOUS at 15:18

## 2023-10-26 ASSESSMENT — ACTIVITIES OF DAILY LIVING (ADL)
ADLS_ACUITY_SCORE: 35
ADLS_ACUITY_SCORE: 35

## 2023-10-26 ASSESSMENT — ENCOUNTER SYMPTOMS
FEVER: 0
WOUND: 0
ABDOMINAL PAIN: 0

## 2023-10-26 NOTE — ED NOTES
EMERGENCY DEPARTMENT SIGN OUT NOTE        ED COURSE AND MEDICAL DECISION MAKING  Patient was signed out to me by Dr Shelby Jorge at 2:30 PM  3:43 PM Rechecked and updated patient. Patient has diffuse hives after G tube placement.  Intravenous Benadryl ordered.  Urine analysis with evidence of infection.  Rocephin ordered  For 10 PM.  Rash improved significantly after Benadryl.  After completion of antibiotics we will continue outpatient management.  Cefdinir ordered in liquid form for G-tube use.  In brief, Elysia Arellano is a 58 year old female who initially presented G tube problem. Tube was broken and leaking. Also has groin erythema consistent with Candida yeast infection.    At time of sign out, disposition was pending GJ tube replacement and urinalysis results.    FINAL IMPRESSION    1. Encounter for gastrojejunal (GJ) tube placement    2. Candida rash of groin    3.  UTI    ED MEDS  Medications   diphenhydrAMINE (BENADRYL) injection 12.5 mg (has no administration in time range)   nystatin (MYCOSTATIN) cream ( Topical $Given 10/26/23 1317)   sodium chloride 0.9% BOLUS 1,000 mL (0 mLs Intravenous Stopped 10/26/23 4178)   iohexol (OMNIPAQUE) 350 MG/ML injectable solution 50 mL (20 mLs Intravenous $Given 10/26/23 3852)       LAB  Labs Ordered and Resulted from Time of ED Arrival to Time of ED Departure   ROUTINE UA WITH MICROSCOPIC REFLEX TO CULTURE - Abnormal       Result Value    Color Urine Yellow      Appearance Urine Cloudy (*)     Glucose Urine Negative      Bilirubin Urine Negative      Ketones Urine Negative      Specific Gravity Urine 1.017      Blood Urine 0.06 mg/dL (*)     pH Urine 8.0 (*)     Protein Albumin Urine 100 (*)     Urobilinogen Urine <2.0      Nitrite Urine Positive (*)     Leukocyte Esterase Urine 75 Jose D/uL (*)     Bacteria Urine Moderate (*)     RBC Urine 5 (*)     WBC Urine 99 (*)     Squamous Epithelials Urine 104 (*)    BASIC METABOLIC PANEL - Abnormal    Sodium 141       Potassium 4.0      Chloride 106      Carbon Dioxide (CO2) 28      Anion Gap 7      Urea Nitrogen 27.4 (*)     Creatinine 0.35 (*)     GFR Estimate >90      Calcium 9.5      Glucose 90     CBC WITH PLATELETS AND DIFFERENTIAL - Abnormal    WBC Count 11.4 (*)     RBC Count 4.24      Hemoglobin 13.6      Hematocrit 42.0      MCV 99      MCH 32.1      MCHC 32.4      RDW 13.1      Platelet Count 268      % Neutrophils 72      % Lymphocytes 16      % Monocytes 9      % Eosinophils 1      % Basophils 1      % Immature Granulocytes 1      NRBCs per 100 WBC 0      Absolute Neutrophils 8.4 (*)     Absolute Lymphocytes 1.8      Absolute Monocytes 1.0      Absolute Eosinophils 0.1      Absolute Basophils 0.1      Absolute Immature Granulocytes 0.1      Absolute NRBCs 0.0     URINE CULTURE           RADIOLOGY    IR Gastro Jejunostomy Tube Change    (Results Pending)       DISCHARGE MEDS  Current Discharge Medication List        START taking these medications    Details   cefdinir (OMNICEF) 250 MG/5ML suspension Take 12 mLs (600 mg) by mouth daily for 10 days  Qty: 120 mL, Refills: 0              Parag Del Angel MD  [facility] EMERGENCY DEPARTMENT  [address] [place] [id number]  [phone]       Parag Del Angel MD  10/26/23 2441

## 2023-10-26 NOTE — ED TRIAGE NOTES
Pt presents to ED with Gtube problem. Hole in external part of the line. Here with group home staff.

## 2023-10-26 NOTE — ED PROVIDER NOTES
EMERGENCY DEPARTMENT ENCOUNTER      NAME: Elysia Arellano  AGE: 58 year old female  YOB: 1965  MRN: [id number]  EVALUATION DATE & TIME: No admission date for patient encounter.    PCP: Eduardo Clement    ED PROVIDER: Shelby Jorge M.D.        Chief Complaint   Patient presents with    Gtube Problem         FINAL IMPRESSION:    1. Encounter for gastrojejunal (GJ) tube placement    2. Candida rash of groin            MEDICAL DECISION MAKING:    Elysia Arellano is a 58 year old female with history of iliopsoas, cerebral palsy, developmental delay, seizure disorder, GERD, chronic GJ tube who presents to the ER with complaints of GJ tube is cracked and leaking once last night.  They tried to call radiology to schedule as an outpatient but were unsuccessful and referred to the ER.  In the ER here the tube does appear to be broken and leaking.  IR will replace this tubing today.  Patient just awaiting the opportunity for them to be available.    While here in the ER nursing went to change her briefs and noted that she has erythema to the groin area.  On my evaluation seems consistent with Candida yeast infection.  Nothing else to suggest Luisa's gangrene, necrotizing fasciitis, etc.  Try to get a cath urine on her but minimal output.  Concerned that she may be a bit dehydrated since she was not getting tube feeds during the night.  IV fluids have been ordered.  We will send a new urinalysis when she has some urine output.    Patient will be ready for disposition pending GJ tube replacement and urinalysis results.  Signed out at change of shift to Dr. Del Angel awaiting these results.  Nothing to suggest sepsis at this time.    Chart review shows that she has been on nystatin powder in the past.  I refilled this prescription.      ED COURSE:  10:49 AM  I met with the patient to gather history and perform my exam. ED course and treatment discussed.    11:02 AM  Order placed for IR to replace the GJ  tube.    11:34 AM  MARIA T confirmed that the IR department has received my consult order and will let me know if anything is needed from me otherwise at this time.    12:31 PM  Nursing went to change her briefs because she had wet and noted that she has a lot of erythema around the groin consistent with Candida.  This is what it look like on my examination as well.  They did a cath urine which is very dark and cloudy but only minimal amount.  We will give her some IV fluids, send the urine, and check some basic laboratories.    2:00 PM  Blood pressures look good.  Nothing to suggest sepsis at this time.  Likely a bit dehydrated as she normally gets tube feeds and her G-tube has been broken since last night.  She is receiving IV fluids.  Patient awaiting her chance to go to get her GJ tube replaced.  Anticipate discharge home after GJ tube replacement and urinalysis evaluation.  She has not been having any fevers or other symptoms per group home to suggest that she has other processes going on.  At this time no indication for emergent imaging other than GJ tube placement.    I do not think that this represents ACS, PE, ruptured AAA, aortic dissection, bowel obstruction, bowel ischemia, cholecystitis, pancreatitis, appendicitis, diverticulitis, kidney stone, pyelonephritis, incarcerated or strangulated hernia, ovarian torsion, PID, ectopic pregnancy, tubo-ovarian abscess, viscus perforation, perforated GI ulcer, or other such etiologies at this time.      CONSULTANTS:  IR to GJ tube replacement        MEDICATIONS GIVEN IN THE EMERGENCY:  Medications   sodium chloride 0.9% BOLUS 1,000 mL (1,000 mLs Intravenous $New Bag 10/26/23 0753)   nystatin (MYCOSTATIN) cream ( Topical $Given 10/26/23 7277)           NEW PRESCRIPTIONS STARTED AT TODAY'S ER VISIT     Medication List        Modified      nystatin 120446 UNIT/GM external cream  Commonly known as: MYCOSTATIN  Topical, 3 TIMES DAILY PRN  What changed:   reasons to take  "this  additional instructions                  CONDITION:  stable        DISPOSITION:  pending         =================================================================  =================================================================  TRIAGE ASSESSMENT:  Pt presents to ED with Gtube problem. Hole in external part of the line. Here with group home staff.       ED Triage Vitals   Enc Vitals Group      BP 10/26/23 1028 120/70      Pulse 10/26/23 1028 90      Resp 10/26/23 1028 16      Temp 10/26/23 1033 97.6  F (36.4  C)      Temp src --       SpO2 10/26/23 1028 96 %      Weight 10/26/23 1028 42.2 kg (93 lb)      Height 10/26/23 1028 1.499 m (4' 11\")          ================================================================  ================================================================    HPI    Patient information was obtained from: care giver    Use of Intrepreter: N/A      Elysia CORTEZ Arellano is a 58 year old female with history of iliopsoas, cerebral palsy, developmental delay, seizure disorder, GERD, chronic GJ tube who presents to the ER with complaints of GJ tube is cracked and leaking.    Caregiver states that it was noted last night that the tubing was cracked and started to leak.  They have placed tape around the tubing but continues to leak.  They tried to call the IR department directly to schedule for replacement of the tube but were told there were no appointments available to go to the emergency department.    They have no other concerns or complaints at this time.  She has had this GJ tube for a long time and they state it is due to be replaced in about 1 month.    Chart review looks like it was changed August 25, 2023.      REVIEW OF SYSTEMS  Review of Systems   Constitutional:  Negative for fever.   Gastrointestinal:  Negative for abdominal pain.   Skin:  Negative for rash and wound.       PAST MEDICAL HISTORY:  Past Medical History:   Diagnosis Date    Cerebral palsy (H)     Depression     GERD " (gastroesophageal reflux disease)     Hyperopia     Mental retardation     Osteoporosis     Pneumonia due to 2019 novel coronavirus 8/7/2020    Pyelonephritis     Scoliosis     Seizure disorder (H)     UTI (urinary tract infection)          PAST SURGICAL HISTORY:  Past Surgical History:   Procedure Laterality Date    AMPUTATE TOE(S) Right 11/21/2019    Procedure: AMPUTATION, TOE fifth toe right foot;  Surgeon: Wade Garcia DPM;  [place] [facility] [place]  Service: Podiatry    BACK SURGERY      IR ABSCESS TUBE CHANGE  6/27/2000    IR ABSCESS TUBE CHANGE  9/5/2000    IR ABSCESS TUBE CHANGE  3/15/2001    IR ABSCESS TUBE CHANGE  7/6/2001    IR ABSCESS TUBE CHANGE  9/4/2001    IR GASTRO JEJUNOSTOMY TUBE CHANGE  8/30/2009    IR GASTRO JEJUNOSTOMY TUBE CHANGE  8/5/2014    IR GASTRO JEJUNOSTOMY TUBE CHANGE  11/5/2014    IR GASTRO JEJUNOSTOMY TUBE CHANGE  2/18/2015    IR GASTRO JEJUNOSTOMY TUBE CHANGE  5/18/2015    IR GASTRO JEJUNOSTOMY TUBE CHANGE  8/17/2015    IR GASTRO JEJUNOSTOMY TUBE CHANGE  10/28/2015    IR GASTRO JEJUNOSTOMY TUBE CHANGE  1/16/2016    IR GASTRO JEJUNOSTOMY TUBE CHANGE  4/14/2016    IR GASTRO JEJUNOSTOMY TUBE CHANGE  5/13/2016    IR GASTRO JEJUNOSTOMY TUBE CHANGE  6/16/2016    IR GASTRO JEJUNOSTOMY TUBE CHANGE  7/20/2016    IR GASTRO JEJUNOSTOMY TUBE CHANGE  9/7/2016    IR GASTRO JEJUNOSTOMY TUBE CHANGE  10/24/2016    IR GASTRO JEJUNOSTOMY TUBE CHANGE  1/8/2017    IR GASTRO JEJUNOSTOMY TUBE CHANGE  3/19/2017    IR GASTRO JEJUNOSTOMY TUBE CHANGE  4/25/2017    IR GASTRO JEJUNOSTOMY TUBE CHANGE  6/10/2017    IR GASTRO JEJUNOSTOMY TUBE CHANGE  9/11/2017    IR GASTRO JEJUNOSTOMY TUBE CHANGE  12/11/2017    IR GASTRO JEJUNOSTOMY TUBE CHANGE  2/12/2018    IR GASTRO JEJUNOSTOMY TUBE CHANGE  5/16/2018    IR GASTRO JEJUNOSTOMY TUBE CHANGE  8/27/2018    IR GASTRO JEJUNOSTOMY TUBE CHANGE  11/13/2018    IR GASTRO JEJUNOSTOMY TUBE CHANGE  1/8/2019    IR GASTRO JEJUNOSTOMY TUBE CHANGE  2/6/2019    IR GASTRO  JEJUNOSTOMY TUBE CHANGE  4/29/2019    IR GASTRO JEJUNOSTOMY TUBE CHANGE  6/25/2019    IR GASTRO JEJUNOSTOMY TUBE CHANGE  9/24/2019    IR GASTRO JEJUNOSTOMY TUBE CHANGE  1/29/2020    IR GASTRO JEJUNOSTOMY TUBE CHANGE  5/6/2020    IR GASTRO JEJUNOSTOMY TUBE CHANGE  8/3/2020    IR GASTRO JEJUNOSTOMY TUBE CHANGE  11/10/2020    IR GASTRO JEJUNOSTOMY TUBE CHANGE  2/11/2021    IR GASTRO JEJUNOSTOMY TUBE CHANGE  5/17/2021    IR GASTRO JEJUNOSTOMY TUBE CHANGE  8/16/2021    IR GASTRO JEJUNOSTOMY TUBE CHANGE  11/19/2021    IR GASTRO JEJUNOSTOMY TUBE CHANGE  2/22/2022    IR GASTRO JEJUNOSTOMY TUBE CHANGE  7/22/2022    IR GASTRO JEJUNOSTOMY TUBE CHANGE  8/12/2022    IR GASTRO JEJUNOSTOMY TUBE CHANGE  11/17/2022    IR GASTRO JEJUNOSTOMY TUBE CHANGE  2/14/2023    IR GASTRO JEJUNOSTOMY TUBE CHANGE  4/19/2023    IR GASTRO JEJUNOSTOMY TUBE CHANGE  6/9/2023    IR GASTRO JEJUNOSTOMY TUBE CHANGE  6/8/2023    IR GASTRO JEJUNOSTOMY TUBE CHANGE  8/25/2023    IR GASTRO JEJUNOSTOMY TUBE PLACEMENT      IR GASTROSTOMY TUBE CHANGE  7/18/2002    IR GJ TUBE REPLACEMENT  11/13/2018    IR GJ TUBE REPLACEMENT  1/8/2019    IR GJ TUBE REPLACEMENT  2/6/2019    IR GJ TUBE REPLACEMENT  4/29/2019    IR GJ TUBE REPLACEMENT  6/25/2019    IR GJ TUBE REPLACEMENT  9/24/2019    IR GJ TUBE REPLACEMENT  1/29/2020    IR GJ TUBE REPLACEMENT  5/6/2020    IR GJ TUBE REPLACEMENT  8/3/2020    IR GJ TUBE REPLACEMENT  11/10/2020    IR GJ TUBE REPLACEMENT  2/11/2021    IR GJ TUBE REPLACEMENT  5/17/2021    IR MISCELLANEOUS PROCEDURE  6/19/2000    IR MISCELLANEOUS PROCEDURE  12/22/2001    IR MISCELLANEOUS PROCEDURE  1/7/2002    IR MISCELLANEOUS PROCEDURE  1/19/2002    IR MISCELLANEOUS PROCEDURE  1/19/2002    IR MISCELLANEOUS PROCEDURE  2/20/2002    IR MISCELLANEOUS PROCEDURE  2/20/2002    IR MISCELLANEOUS PROCEDURE  3/19/2002    IR MISCELLANEOUS PROCEDURE  4/16/2002    IR MISCELLANEOUS PROCEDURE  7/11/2002    IR MISCELLANEOUS PROCEDURE  7/11/2002    IR MISCELLANEOUS  PROCEDURE  7/30/2002    IR MISCELLANEOUS PROCEDURE  8/16/2002    IR MISCELLANEOUS PROCEDURE  8/31/2002    IR MISCELLANEOUS PROCEDURE  9/18/2002    IR MISCELLANEOUS PROCEDURE  3/1/2003    IR MISCELLANEOUS PROCEDURE  8/5/2003    IR MISCELLANEOUS PROCEDURE  1/29/2004    IR MISCELLANEOUS PROCEDURE  3/18/2004    IR MISCELLANEOUS PROCEDURE  7/21/2004    IR MISCELLANEOUS PROCEDURE  11/15/2004    IR MISCELLANEOUS PROCEDURE  2/18/2005    IR MISCELLANEOUS PROCEDURE  4/8/2005    IR MISCELLANEOUS PROCEDURE  6/30/2005    IR MISCELLANEOUS PROCEDURE  9/30/2005    IR MISCELLANEOUS PROCEDURE  12/20/2005    IR MISCELLANEOUS PROCEDURE  3/28/2006    IR MISCELLANEOUS PROCEDURE  6/30/2006    IR MISCELLANEOUS PROCEDURE  10/30/2006    IR MISCELLANEOUS PROCEDURE  2/28/2007    IR MISCELLANEOUS PROCEDURE  6/19/2007    IR MISCELLANEOUS PROCEDURE  9/25/2007    IR MISCELLANEOUS PROCEDURE  12/10/2007    IR MISCELLANEOUS PROCEDURE  1/5/2008    IR MISCELLANEOUS PROCEDURE  6/18/2008    IR MISCELLANEOUS PROCEDURE  8/25/2008    IR MISCELLANEOUS PROCEDURE  12/18/2008    IR MISCELLANEOUS PROCEDURE  2/18/2009    IR MISCELLANEOUS PROCEDURE  8/4/2009    IR MISCELLANEOUS PROCEDURE  11/6/2009    IR MISCELLANEOUS PROCEDURE  12/28/2009    IR MISCELLANEOUS PROCEDURE  4/13/2010    IR MISCELLANEOUS PROCEDURE  6/29/2010    IR MISCELLANEOUS PROCEDURE  10/11/2010    IR MISCELLANEOUS PROCEDURE  1/3/2011    IR MISCELLANEOUS PROCEDURE  2/25/2011    IR MISCELLANEOUS PROCEDURE  5/26/2011    IR MISCELLANEOUS PROCEDURE  8/23/2011    IR MISCELLANEOUS PROCEDURE  11/17/2011    IR MISCELLANEOUS PROCEDURE  2/15/2012    IR MISCELLANEOUS PROCEDURE  5/18/2012    IR MISCELLANEOUS PROCEDURE  8/16/2012    IR MISCELLANEOUS PROCEDURE  10/25/2012    IR MISCELLANEOUS PROCEDURE  1/14/2013    IR MISCELLANEOUS PROCEDURE  4/19/2013    IR MISCELLANEOUS PROCEDURE  7/24/2013    IR MISCELLANEOUS PROCEDURE  10/24/2013    IR MISCELLANEOUS PROCEDURE  12/23/2013    IR MISCELLANEOUS PROCEDURE   4/3/2014    IR MISCELLANEOUS PROCEDURE  5/20/2014    Scoliosis S/P Lewis Valentín Placement      SPINAL FUSION           CURRENT MEDICATIONS:    Prior to Admission medications    Medication Sig Start Date End Date Taking? Authorizing Provider   acetaminophen (TYLENOL) 325 MG tablet Take 650 mg by mouth every 4 hours as needed for mild pain As needed for pain, discomfort, and fever.  Not to exceed 3000 mg in 24 hours.    Reported, Patient   arginine (ARGINAID) PACK Take 1 packet by mouth 2 times daily Per G-tube mixed with 4 ounces of water    Reported, Patient   bisacodyl (DULCOLAX) 10 MG suppository Place 10 mg rectally every other day    Reported, Patient   calcium carbonate 1250 MG/5ML SUSP suspension Take 600 mg by mouth daily Per G-tube    Reported, Patient   carboxymethylcellulose PF (REFRESH PLUS) 0.5 % SOLN ophthalmic solution 1 drop At Bedtime    Reported, Patient   clonazePAM (KLONOPIN) 0.5 MG tablet Take 0.25 mg by mouth At Bedtime Per G-tube    Reported, Patient   desonide (DESOWEN) 0.05 % external cream Apply topically 2 times daily .  Hold if no red, itchy, scaly areas on face.    Reported, Patient   Dextromethorphan-guaiFENesin  MG/5ML syrup Take 10 mLs by mouth every 6 hours as needed for cough    Reported, Patient   diazepam (VALIUM) 5 MG/ML (HIGH CONC) solution Take 5 mg by mouth every 8 hours as needed for anxiety    Reported, Patient   diazePAM 1 MG/ML solution GIVE 5ML (5MG) PER G-TUBE AS NEEDED FOR SEIZURE MAY REPEAT ONCE IN 15 MINUTES. MAX 2 DOSES IN 24 HOURS *SPLIT* *2 TOTAL FILLS* 5/24/23   Onelia Petersen, TESFAYE CNP   diphenhydrAMINE (BENADRYL) 12.5 MG/5ML solution Take 25 mg by mouth every 4 hours as needed for sleep     Reported, Patient   folic acid (FOLVITE) 1 MG tablet Take 1 mg by mouth daily Per G- tube    Reported, Patient   hydrocortisone (CORTAID) 1 % external cream Apply topically 2 times daily as needed To affected area of abdomen    Reported, Patient   lamoTRIgine (LAMICTAL)  200 MG TBDP ODT tab DISSOLVE 1 TABLET IN 5-10ML OF WATER AND TAKE PER G-TUBE TWICE DAILY ALONG WITH 75MG FOR TOTAL DOSE 275MG 5/24/23   Onelia Petersen APRN CNP   lamoTRIgine (LAMICTAL) 25 MG chewable tablet 3 tablets (75 mg) by Per G Tube route 2 times daily 5/24/23   Onelia Petersen APRN CNP   levETIRAcetam (KEPPRA) 100 MG/ML oral solution GIVE 10ML (1000MG) PER G-TUBE TWICE DAILY 5/24/23   Onelia Petersen APRN CNP   levothyroxine (SYNTHROID/LEVOTHROID) 50 MCG tablet Take 50 mcg by mouth daily Per G-tube    Reported, Patient   multivitamin w/minerals (THERA-VIT-M) tablet Take 1 tablet by mouth daily Per G-tube    Reported, Patient   nitroFURantoin (FURADANTIN) 25 MG/5ML suspension Take 100 mg by mouth 4 times daily Per G-tube    Reported, Patient   nystatin (MYCOSTATIN) 416421 UNIT/GM external cream Apply topically 3 times daily as needed for dry skin To groin, and two times daily to G-tube site    Reported, Patient   polyethylene glycol (MIRALAX/GLYCOLAX) packet Take 1 packet by mouth 2 times daily as needed for constipation Per G-tube, mix with 150 ml of water    Reported, Patient   protein modular, BENEPROTEIN, PACK 7 g by Per Feeding Tube route 2 times daily    Reported, Patient   Psyllium (GENFIBER PO) Take 2 Tablespoonful by mouth daily Per G-tube with 4 ounces of water flush    Reported, Patient   rectal diazepam KIT 1 kit once as needed for medication administration 10 mg rectally once for seizure lasting over 5 minutes.    Reported, Patient   Sodium Phosphates (FLEET ENEMA RE) Place rectally every other day If no results from suppository    Reported, Patient   topiramate (TOPAMAX) 200 MG tablet TAKE 1 TABLET PER G-TUBE TWICE DAILY 5/24/23   Onelia Petersen APRN CNP   VITAMIN D (CHOLECALCIFEROL) PO Take 400 Units by mouth daily 2.5ml qd    Reported, Patient         ALLERGIES:  Allergies   Allergen Reactions    Ciprofloxacin Rash     Other reaction(s): *Unknown    Aspirin Buf(Cacarb-Mgcarb-Mgo)      Other  "reaction(s): Unknown    Lanolin      Other reaction(s): *Unknown    Nsaids      Other reaction(s): Unknown    Amoxicillin-Pot Clavulanate Rash    Ibuprofen Rash     Other reaction(s): *Unknown    Salicylates Rash     Other reaction(s): Unknown         FAMILY HISTORY:  Family History   Problem Relation Age of Onset    Seizure Disorder Sister          SOCIAL HISTORY:  Social History     Socioeconomic History    Marital status: Single   Tobacco Use    Smoking status: Never    Smokeless tobacco: Never   Substance and Sexual Activity    Alcohol use: No    Drug use: No    Sexual activity: Not Currently     Birth control/protection: Post-menopausal         VITALS:  Patient Vitals for the past 24 hrs:   BP Temp Pulse Resp SpO2 Height Weight   10/26/23 1357 123/86 -- 84 20 94 % -- --   10/26/23 1338 114/78 -- 93 18 94 % -- --   10/26/23 1322 115/85 -- 94 -- 95 % -- --   10/26/23 1033 -- 97.6  F (36.4  C) -- -- -- -- --   10/26/23 1028 120/70 -- 90 16 96 % 1.499 m (4' 11\") 42.2 kg (93 lb)       Wt Readings from Last 3 Encounters:   10/26/23 42.2 kg (93 lb)   05/24/23 44 kg (97 lb)   04/27/22 48.1 kg (106 lb)       Estimated Creatinine Clearance: 119.6 mL/min (A) (based on SCr of 0.35 mg/dL (L)).    PHYSICAL EXAM    Constitutional:  Well developed, Well nourished, NAD, GCS 15  HENT:  Normocephalic, Atraumatic, Bilateral external ears normal, Nose normal. Neck- Supple, No stridor.   Eyes:  PERRL, EOMI, Conjunctiva normal, No discharge.  Respiratory:  Normal breath sounds, No respiratory distress, No wheezing, Speaks full sentences easily. No cough.   Cardiovascular:  Normal heart rate, Regular rhythm, No rubs, No gallops. Chest wall nontender.  GI:  No excessive obesity.  Bowel sounds normal, Soft, No tenderness, No masses, No flank tenderness. No rebound or guarding. GJ tube noted in abd with tape around outermost area of tubing near ports. This is reported to be where it is leaking. See photo below.  : " "deferred  Musculoskeletal:  No cyanosis, No clubbing. Good range of motion in all major joints. No major deformities noted.   Integument:  Warm, Dry, No erythema, No rash.  No petechiae.   Neurologic:  Alert  Psychiatric:  Affect normal, Cooperative       GJ tube with leaking area at spot of tape.      LAB:  All pertinent labs reviewed and interpreted.  Recent Results (from the past 24 hour(s))   Basic metabolic panel    Collection Time: 10/26/23 12:47 PM   Result Value Ref Range    Sodium 141 135 - 145 mmol/L    Potassium 4.0 3.4 - 5.3 mmol/L    Chloride 106 98 - 107 mmol/L    Carbon Dioxide (CO2) 28 22 - 29 mmol/L    Anion Gap 7 7 - 15 mmol/L    Urea Nitrogen 27.4 (H) 6.0 - 20.0 mg/dL    Creatinine 0.35 (L) 0.51 - 0.95 mg/dL    GFR Estimate >90 >60 mL/min/1.73m2    Calcium 9.5 8.6 - 10.0 mg/dL    Glucose 90 70 - 99 mg/dL   CBC with platelets and differential    Collection Time: 10/26/23 12:47 PM   Result Value Ref Range    WBC Count 11.4 (H) 4.0 - 11.0 10e3/uL    RBC Count 4.24 3.80 - 5.20 10e6/uL    Hemoglobin 13.6 11.7 - 15.7 g/dL    Hematocrit 42.0 35.0 - 47.0 %    MCV 99 78 - 100 fL    MCH 32.1 26.5 - 33.0 pg    MCHC 32.4 31.5 - 36.5 g/dL    RDW 13.1 10.0 - 15.0 %    Platelet Count 268 150 - 450 10e3/uL    % Neutrophils 72 %    % Lymphocytes 16 %    % Monocytes 9 %    % Eosinophils 1 %    % Basophils 1 %    % Immature Granulocytes 1 %    NRBCs per 100 WBC 0 <1 /100    Absolute Neutrophils 8.4 (H) 1.6 - 8.3 10e3/uL    Absolute Lymphocytes 1.8 0.8 - 5.3 10e3/uL    Absolute Monocytes 1.0 0.0 - 1.3 10e3/uL    Absolute Eosinophils 0.1 0.0 - 0.7 10e3/uL    Absolute Basophils 0.1 0.0 - 0.2 10e3/uL    Absolute Immature Granulocytes 0.1 <=0.4 10e3/uL    Absolute NRBCs 0.0 10e3/uL       No results found for: \"ABORH\"        RADIOLOGY:  Reviewed all pertinent imaging. Please see official radiology report.    IR Gastro Jejunostomy Tube Change    (Results Pending)         EKG:    none        PROCEDURES:  IR GJ tube " replacement        Medical Decision Making    History:  Supplemental history from: Caregiver  External Record(s) reviewed: Documented in chart, if applicable.    Work Up:  Chart documentation includes differential considered and any EKGs or imaging independently interpreted by provider, where specified.  In additional to work up documented, I considered the following work up: Documented in chart, if applicable.    External consultation:  Discussion of management with another provider: Documented in chart, if applicable    Complicating factors:  Care impacted by chronic illness: Other: chronic GJ tube  Care affected by social determinants of health: Access to Medical Care    Disposition considerations:  Pt signed out at change of shift awaiting GJ tube replacement and UA results.        Shelby Jorge M.D. [facility]  Emergency Medicine and Medical Toxicology  Formerly [facility] EMERGENCY DEPARTMENT  [facility] [facility] [place] [id number] [phone]  Dept: [phone]           Shelby Jorge MD  10/26/23 3645

## 2023-10-26 NOTE — DISCHARGE INSTRUCTIONS
GJ tube was exchanged today.  You can use this as you normally would.    Your groin area does look like is experiencing a yeast infection.  Please use the nystatin powder as directed. If there is not improvement by Monday then please follow-up with primary care doctor on Monday or other medical provider.    Please return the emergency department if you develop a fever, abdominal pain, vomiting, diarrhea, troubles with your GJ tube again, or any other concerns.    Thank you for choosing [facility] Emergency Department.  It has been my pleasure caring for you today.     ~Dr. Ania MD

## 2023-10-26 NOTE — ED NOTES
Pt noted to be tachycardic and has faint red rash diffusely spread over face, chest, and abdomen. Provider at bedside. Respirations even and nonlabored. Meds to be ordered.

## 2023-10-28 LAB
BACTERIA UR CULT: ABNORMAL

## 2023-12-04 ENCOUNTER — HOSPITAL ENCOUNTER (OUTPATIENT)
Dept: INTERVENTIONAL RADIOLOGY/VASCULAR | Facility: HOSPITAL | Age: 58
Discharge: HOME | End: 2023-12-04
Admitting: RADIOLOGY
Payer: MEDICARE

## 2023-12-04 VITALS — TEMPERATURE: 99.3 F | RESPIRATION RATE: 20 BRPM

## 2023-12-04 DIAGNOSIS — R63.30 FEEDING DIFFICULTIES: Primary | ICD-10-CM

## 2023-12-04 DIAGNOSIS — R63.30 FEEDING DIFFICULTIES: ICD-10-CM

## 2023-12-04 PROCEDURE — C1769 GUIDE WIRE: HCPCS

## 2023-12-04 PROCEDURE — 49452 REPLACE G-J TUBE PERC: CPT

## 2024-01-30 ENCOUNTER — TELEPHONE (OUTPATIENT)
Dept: INTERVENTIONAL RADIOLOGY/VASCULAR | Facility: CLINIC | Age: 59
End: 2024-01-30
Payer: MEDICARE

## 2024-01-31 ENCOUNTER — HOSPITAL ENCOUNTER (OUTPATIENT)
Dept: INTERVENTIONAL RADIOLOGY/VASCULAR | Facility: HOSPITAL | Age: 59
Discharge: HOME | End: 2024-01-31
Admitting: RADIOLOGY
Payer: MEDICARE

## 2024-01-31 VITALS
TEMPERATURE: 98.4 F | SYSTOLIC BLOOD PRESSURE: 119 MMHG | DIASTOLIC BLOOD PRESSURE: 73 MMHG | OXYGEN SATURATION: 98 % | RESPIRATION RATE: 16 BRPM | HEART RATE: 88 BPM

## 2024-01-31 DIAGNOSIS — R63.30 FEEDING DIFFICULTIES: Primary | ICD-10-CM

## 2024-01-31 DIAGNOSIS — R63.30 FEEDING DIFFICULTIES: ICD-10-CM

## 2024-01-31 PROCEDURE — C1769 GUIDE WIRE: HCPCS

## 2024-01-31 PROCEDURE — 49452 REPLACE G-J TUBE PERC: CPT

## 2024-01-31 PROCEDURE — 255N000002 HC RX 255 OP 636: Performed by: RADIOLOGY

## 2024-01-31 RX ADMIN — IOHEXOL 20 ML: 350 INJECTION, SOLUTION INTRAVENOUS at 10:07

## 2024-01-31 NOTE — PROVIDER NOTIFICATION
Discharge instructions reviewed with patients caregiver. Pt. Tolerated the procedure well. Discharged via wheelchair.

## 2024-01-31 NOTE — DISCHARGE INSTRUCTIONS
Gastrostomy (G) or Gastrojejunostomy (G/J) Tube Exchange Discharge Instructions:   You had a gastrostomy (stomach) tube or a Gastrojejunostomy Tube (stomach and jejunum) exchanged on 1/31/24.   This tube is often used for nutritional support and medication administration or it can be used for stomach venting.     Care instructions:  - If you received sedation for your procedure, do not drive or operate heavy machinery for the rest of the day.  - Avoid soaking in stagnant water (tub baths, Jacuzzis, pools, lake, or ocean).   - You may shower beginning the day after the tube was exchanged.   - Clean under the disc daily with soap and water. Pat dry under disc and apply new split gauze dressing under disc. Cleaning tube site daily will help prevent infection and skin irritation.  - A small amount of clear tan drainage from the tube exit site can be normal.  - Make sure the disc on the tube fits slightly snug against the skin so that the tube does not move in or out of the body easily.  - Flush your tube with 60cc of water twice a day (using a cath tip syringe) to prevent tube from clogging (or follow recommendations from your doctor or dietician if given).    Giving Feedings and Medications:  - Follow-up with your dietician, primary care provider, or oncologist for instructions on tube feedings and medication administration.    - ONLY use specific enteric feedings with your tube (unless otherwise discussed with your dietitian).    - Flush tube at least twice daily with 60ml of water using cath tip syringe unless otherwise instructed by your doctor or dietician.  - Flush the tube before and after administrating medications and bolus feedings with 60cc of water.    Follow Up:  -Recommend routine 3 month exchanges of feeding tube. Please contact your primary care provider or speak with your dietitian to obtain an order to have your G/GJ tube exchanged. Then contact [facility]'s Medical Imaging scheduling department  at [phone] to schedule your G/GJ tube exchange appointment.    Please seek medical evaluation for:  - Fever (greater than 101 F (38.3C).  - Purulent (yellow/green/foul smelling) drainage from tube exit site.   - Significant or worsening abdominal pain.   - Skin that is hot to the touch or significantly reddened at the tube exit site.   - Bleeding at tube exit site.    Call [facility] RN Line at [phone] with questions or if you have any of the following symptoms:  - Tube falls out or felt to be out of position.  - Unable to flush tube.  - Significant leakage around tube site (tube feeding, medications or drainage).  - Significant bleeding at the tube exit site.  - Severe pain at tube exit site.

## 2024-01-31 NOTE — SEDATION DOCUMENTATION
Patient Name: Elysia Arellano  Medical Record Number: [id number]  Today's Date: 1/31/2024    Procedure: GJ tube exchange  Proceduralist: Dr. Bowling    Procedure Start: 10:00  Procedure end: 10:04      Other Notes: Pt arrived to IR room 1 from IR holding. Consent reviewed. Pt denies any questions or concerns regarding procedure. Pt positioned supine and monitored per protocol. Pt tolerated procedure without any noted complications. Pt transferred back to IR holding.

## 2024-02-12 ENCOUNTER — LAB REQUISITION (OUTPATIENT)
Dept: LAB | Facility: CLINIC | Age: 59
End: 2024-02-12
Payer: MEDICARE

## 2024-02-12 ENCOUNTER — LAB REQUISITION (OUTPATIENT)
Dept: LAB | Facility: HOSPITAL | Age: 59
End: 2024-02-12
Payer: MEDICARE

## 2024-02-12 DIAGNOSIS — D64.9 ANEMIA, UNSPECIFIED: ICD-10-CM

## 2024-02-12 DIAGNOSIS — G80.9 CEREBRAL PALSY, UNSPECIFIED (H): ICD-10-CM

## 2024-02-12 DIAGNOSIS — E03.9 HYPOTHYROIDISM, UNSPECIFIED: ICD-10-CM

## 2024-02-12 DIAGNOSIS — F73 PROFOUND INTELLECTUAL DISABILITIES: ICD-10-CM

## 2024-02-12 DIAGNOSIS — G40.909 EPILEPSY, UNSPECIFIED, NOT INTRACTABLE, WITHOUT STATUS EPILEPTICUS (H): ICD-10-CM

## 2024-02-12 LAB
ALBUMIN SERPL BCG-MCNC: 3.7 G/DL (ref 3.5–5.2)
ALP SERPL-CCNC: 283 U/L (ref 40–150)
ALT SERPL W P-5'-P-CCNC: 8 U/L (ref 0–50)
ANION GAP SERPL CALCULATED.3IONS-SCNC: 8 MMOL/L (ref 7–15)
AST SERPL W P-5'-P-CCNC: 20 U/L (ref 0–45)
BILIRUB SERPL-MCNC: <0.2 MG/DL
BUN SERPL-MCNC: 22.3 MG/DL (ref 6–20)
CALCIUM SERPL-MCNC: 9.5 MG/DL (ref 8.6–10)
CHLORIDE SERPL-SCNC: 102 MMOL/L (ref 98–107)
CREAT SERPL-MCNC: 0.33 MG/DL (ref 0.51–0.95)
DEPRECATED HCO3 PLAS-SCNC: 28 MMOL/L (ref 22–29)
EGFRCR SERPLBLD CKD-EPI 2021: >90 ML/MIN/1.73M2
ERYTHROCYTE [DISTWIDTH] IN BLOOD BY AUTOMATED COUNT: 14.5 % (ref 10–15)
GLUCOSE SERPL-MCNC: 97 MG/DL (ref 70–99)
HCT VFR BLD AUTO: 40.1 % (ref 35–47)
HGB BLD-MCNC: 12.9 G/DL (ref 11.7–15.7)
MCH RBC QN AUTO: 29.9 PG (ref 26.5–33)
MCHC RBC AUTO-ENTMCNC: 32.2 G/DL (ref 31.5–36.5)
MCV RBC AUTO: 93 FL (ref 78–100)
PLATELET # BLD AUTO: 334 10E3/UL (ref 150–450)
POTASSIUM SERPL-SCNC: 3.7 MMOL/L (ref 3.4–5.3)
PROT SERPL-MCNC: 7.3 G/DL (ref 6.4–8.3)
RBC # BLD AUTO: 4.31 10E6/UL (ref 3.8–5.2)
SODIUM SERPL-SCNC: 138 MMOL/L (ref 135–145)
TSH SERPL DL<=0.005 MIU/L-ACNC: 3.43 UIU/ML (ref 0.3–4.2)
WBC # BLD AUTO: 8.7 10E3/UL (ref 4–11)

## 2024-02-12 PROCEDURE — 36415 COLL VENOUS BLD VENIPUNCTURE: CPT | Mod: ORL | Performed by: FAMILY MEDICINE

## 2024-02-12 PROCEDURE — 84443 ASSAY THYROID STIM HORMONE: CPT | Mod: ORL | Performed by: FAMILY MEDICINE

## 2024-02-12 PROCEDURE — 80053 COMPREHEN METABOLIC PANEL: CPT | Mod: ORL | Performed by: FAMILY MEDICINE

## 2024-02-12 PROCEDURE — 85025 COMPLETE CBC W/AUTO DIFF WBC: CPT | Mod: ORL | Performed by: FAMILY MEDICINE

## 2024-02-12 PROCEDURE — 85048 AUTOMATED LEUKOCYTE COUNT: CPT | Mod: ORL,91 | Performed by: FAMILY MEDICINE

## 2024-02-13 LAB
BASOPHILS # BLD AUTO: 0.1 10E3/UL (ref 0–0.2)
BASOPHILS NFR BLD AUTO: 1 %
EOSINOPHIL # BLD AUTO: 0.2 10E3/UL (ref 0–0.7)
EOSINOPHIL NFR BLD AUTO: 3 %
IMM GRANULOCYTES # BLD: 0.1 10E3/UL
IMM GRANULOCYTES NFR BLD: 2 %
LYMPHOCYTES # BLD AUTO: 2 10E3/UL (ref 0.8–5.3)
LYMPHOCYTES NFR BLD AUTO: 23 %
MONOCYTES # BLD AUTO: 0.7 10E3/UL (ref 0–1.3)
MONOCYTES NFR BLD AUTO: 8 %
NEUTROPHILS # BLD AUTO: 5.6 10E3/UL (ref 1.6–8.3)
NEUTROPHILS NFR BLD AUTO: 63 %
NRBC # BLD AUTO: 0 10E3/UL
NRBC BLD AUTO-RTO: 0 /100
WBC # BLD AUTO: 8.7 10E3/UL (ref 4–11)

## 2024-02-20 ENCOUNTER — ANCILLARY PROCEDURE (OUTPATIENT)
Dept: BONE DENSITY | Facility: CLINIC | Age: 59
End: 2024-02-20
Attending: FAMILY MEDICINE
Payer: MEDICARE

## 2024-02-20 DIAGNOSIS — M81.0 OSTEOPOROSIS: ICD-10-CM

## 2024-02-20 DIAGNOSIS — Z78.0 POSTMENOPAUSAL STATUS: ICD-10-CM

## 2024-02-20 PROCEDURE — 77080 DXA BONE DENSITY AXIAL: CPT | Mod: TC

## 2024-02-20 PROCEDURE — 77081 DXA BONE DENSITY APPENDICULR: CPT | Mod: TC

## 2024-03-05 ENCOUNTER — HOSPITAL ENCOUNTER (INPATIENT)
Facility: HOSPITAL | Age: 59
LOS: 6 days | Discharge: NURSING, REHAB OR LONG-TERM CARE | DRG: 871 | End: 2024-03-11
Attending: EMERGENCY MEDICINE | Admitting: INTERNAL MEDICINE
Payer: MEDICARE

## 2024-03-05 ENCOUNTER — APPOINTMENT (OUTPATIENT)
Dept: CT IMAGING | Facility: HOSPITAL | Age: 59
DRG: 871 | End: 2024-03-05
Attending: EMERGENCY MEDICINE
Payer: MEDICARE

## 2024-03-05 DIAGNOSIS — A41.9 SEPSIS, DUE TO UNSPECIFIED ORGANISM, UNSPECIFIED WHETHER ACUTE ORGAN DYSFUNCTION PRESENT (H): ICD-10-CM

## 2024-03-05 DIAGNOSIS — K52.89 STERCORAL COLITIS: ICD-10-CM

## 2024-03-05 DIAGNOSIS — J18.9 PNEUMONIA DUE TO INFECTIOUS ORGANISM, UNSPECIFIED LATERALITY, UNSPECIFIED PART OF LUNG: ICD-10-CM

## 2024-03-05 DIAGNOSIS — Z86.69 HISTORY OF CEREBRAL PALSY: ICD-10-CM

## 2024-03-05 PROBLEM — N31.9 NEUROGENIC BLADDER: Status: ACTIVE | Noted: 2024-03-05

## 2024-03-05 PROBLEM — R33.9 RETENTION OF URINE: Status: ACTIVE | Noted: 2024-03-05

## 2024-03-05 PROBLEM — K59.00 CONSTIPATION: Status: ACTIVE | Noted: 2024-03-05

## 2024-03-05 PROBLEM — G80.0 CP (CEREBRAL PALSY), SPASTIC, QUADRIPLEGIC (H): Status: ACTIVE | Noted: 2024-03-05

## 2024-03-05 PROBLEM — R32 URINARY INCONTINENCE: Status: ACTIVE | Noted: 2024-03-05

## 2024-03-05 PROBLEM — Z91.81 AT HIGH RISK FOR FALLS: Status: ACTIVE | Noted: 2024-03-05

## 2024-03-05 PROBLEM — G40.319: Status: ACTIVE | Noted: 2021-03-17

## 2024-03-05 PROBLEM — Z93.1 GASTROSTOMY PRESENT (H): Status: ACTIVE | Noted: 2022-02-16

## 2024-03-05 LAB
ALBUMIN SERPL BCG-MCNC: 4 G/DL (ref 3.5–5.2)
ALBUMIN UR-MCNC: 30 MG/DL
ALP SERPL-CCNC: 250 U/L (ref 40–150)
ALT SERPL W P-5'-P-CCNC: 9 U/L (ref 0–50)
ANION GAP SERPL CALCULATED.3IONS-SCNC: 10 MMOL/L (ref 7–15)
APPEARANCE UR: ABNORMAL
AST SERPL W P-5'-P-CCNC: 22 U/L (ref 0–45)
BACTERIA #/AREA URNS HPF: ABNORMAL /HPF
BASE EXCESS BLDV CALC-SCNC: 1.2 MMOL/L (ref -3–3)
BASOPHILS # BLD AUTO: 0.1 10E3/UL (ref 0–0.2)
BASOPHILS NFR BLD AUTO: 1 %
BILIRUB DIRECT SERPL-MCNC: <0.2 MG/DL (ref 0–0.3)
BILIRUB SERPL-MCNC: 0.3 MG/DL
BILIRUB UR QL STRIP: NEGATIVE
BUN SERPL-MCNC: 43.6 MG/DL (ref 6–20)
CALCIUM SERPL-MCNC: 10.3 MG/DL (ref 8.6–10)
CHLORIDE SERPL-SCNC: 109 MMOL/L (ref 98–107)
COLOR UR AUTO: YELLOW
CREAT SERPL-MCNC: 0.36 MG/DL (ref 0.51–0.95)
CRP SERPL-MCNC: 4.2 MG/L
DEPRECATED HCO3 PLAS-SCNC: 27 MMOL/L (ref 22–29)
EGFRCR SERPLBLD CKD-EPI 2021: >90 ML/MIN/1.73M2
EOSINOPHIL # BLD AUTO: 0 10E3/UL (ref 0–0.7)
EOSINOPHIL NFR BLD AUTO: 0 %
ERYTHROCYTE [DISTWIDTH] IN BLOOD BY AUTOMATED COUNT: 14.6 % (ref 10–15)
FLUAV RNA SPEC QL NAA+PROBE: NEGATIVE
FLUBV RNA RESP QL NAA+PROBE: NEGATIVE
GLUCOSE SERPL-MCNC: 138 MG/DL (ref 70–99)
GLUCOSE UR STRIP-MCNC: NEGATIVE MG/DL
HCO3 BLDV-SCNC: 26 MMOL/L (ref 21–28)
HCT VFR BLD AUTO: 47.7 % (ref 35–47)
HGB BLD-MCNC: 14.7 G/DL (ref 11.7–15.7)
HGB UR QL STRIP: ABNORMAL
IMM GRANULOCYTES # BLD: 0.1 10E3/UL
IMM GRANULOCYTES NFR BLD: 1 %
KETONES UR STRIP-MCNC: NEGATIVE MG/DL
LACTATE SERPL-SCNC: 1.7 MMOL/L (ref 0.7–2)
LEUKOCYTE ESTERASE UR QL STRIP: ABNORMAL
LIPASE SERPL-CCNC: 30 U/L (ref 13–60)
LYMPHOCYTES # BLD AUTO: 2.9 10E3/UL (ref 0.8–5.3)
LYMPHOCYTES NFR BLD AUTO: 14 %
MAGNESIUM SERPL-MCNC: 2.4 MG/DL (ref 1.7–2.3)
MCH RBC QN AUTO: 29.8 PG (ref 26.5–33)
MCHC RBC AUTO-ENTMCNC: 30.8 G/DL (ref 31.5–36.5)
MCV RBC AUTO: 97 FL (ref 78–100)
MONOCYTES # BLD AUTO: 1.8 10E3/UL (ref 0–1.3)
MONOCYTES NFR BLD AUTO: 9 %
NEUTROPHILS # BLD AUTO: 16.3 10E3/UL (ref 1.6–8.3)
NEUTROPHILS NFR BLD AUTO: 75 %
NITRATE UR QL: NEGATIVE
NRBC # BLD AUTO: 0 10E3/UL
NRBC BLD AUTO-RTO: 0 /100
O2/TOTAL GAS SETTING VFR VENT: 21 %
OXYHGB MFR BLDV: 93 % (ref 70–75)
PCO2 BLDV: 40 MM HG (ref 40–50)
PH BLDV: 7.42 [PH] (ref 7.32–7.43)
PH UR STRIP: 7 [PH] (ref 5–7)
PLATELET # BLD AUTO: 296 10E3/UL (ref 150–450)
PO2 BLDV: 65 MM HG (ref 25–47)
POTASSIUM SERPL-SCNC: 3.2 MMOL/L (ref 3.4–5.3)
PROCALCITONIN SERPL IA-MCNC: 0.11 NG/ML
PROT SERPL-MCNC: 7.9 G/DL (ref 6.4–8.3)
RBC # BLD AUTO: 4.94 10E6/UL (ref 3.8–5.2)
RBC URINE: 19 /HPF
RSV RNA SPEC NAA+PROBE: NEGATIVE
SAO2 % BLDV: 93.6 % (ref 70–75)
SARS-COV-2 RNA RESP QL NAA+PROBE: NEGATIVE
SODIUM SERPL-SCNC: 146 MMOL/L (ref 135–145)
SP GR UR STRIP: 1.01 (ref 1–1.03)
SQUAMOUS EPITHELIAL: 5 /HPF
TROPONIN T SERPL HS-MCNC: 29 NG/L
TROPONIN T SERPL HS-MCNC: 30 NG/L
TSH SERPL DL<=0.005 MIU/L-ACNC: 1.82 UIU/ML (ref 0.3–4.2)
UROBILINOGEN UR STRIP-MCNC: <2 MG/DL
WBC # BLD AUTO: 21.3 10E3/UL (ref 4–11)
WBC URINE: 54 /HPF

## 2024-03-05 PROCEDURE — 96365 THER/PROPH/DIAG IV INF INIT: CPT | Mod: 59

## 2024-03-05 PROCEDURE — 83690 ASSAY OF LIPASE: CPT | Performed by: EMERGENCY MEDICINE

## 2024-03-05 PROCEDURE — 87086 URINE CULTURE/COLONY COUNT: CPT | Performed by: EMERGENCY MEDICINE

## 2024-03-05 PROCEDURE — 93005 ELECTROCARDIOGRAM TRACING: CPT | Performed by: EMERGENCY MEDICINE

## 2024-03-05 PROCEDURE — 250N000013 HC RX MED GY IP 250 OP 250 PS 637: Performed by: EMERGENCY MEDICINE

## 2024-03-05 PROCEDURE — 86140 C-REACTIVE PROTEIN: CPT | Performed by: EMERGENCY MEDICINE

## 2024-03-05 PROCEDURE — 87040 BLOOD CULTURE FOR BACTERIA: CPT | Performed by: EMERGENCY MEDICINE

## 2024-03-05 PROCEDURE — 84145 PROCALCITONIN (PCT): CPT | Performed by: EMERGENCY MEDICINE

## 2024-03-05 PROCEDURE — 83735 ASSAY OF MAGNESIUM: CPT | Performed by: EMERGENCY MEDICINE

## 2024-03-05 PROCEDURE — 36415 COLL VENOUS BLD VENIPUNCTURE: CPT | Performed by: EMERGENCY MEDICINE

## 2024-03-05 PROCEDURE — 85025 COMPLETE CBC W/AUTO DIFF WBC: CPT | Performed by: EMERGENCY MEDICINE

## 2024-03-05 PROCEDURE — 84443 ASSAY THYROID STIM HORMONE: CPT | Performed by: EMERGENCY MEDICINE

## 2024-03-05 PROCEDURE — 84484 ASSAY OF TROPONIN QUANT: CPT | Performed by: EMERGENCY MEDICINE

## 2024-03-05 PROCEDURE — 87186 SC STD MICRODIL/AGAR DIL: CPT | Performed by: EMERGENCY MEDICINE

## 2024-03-05 PROCEDURE — 81003 URINALYSIS AUTO W/O SCOPE: CPT | Performed by: EMERGENCY MEDICINE

## 2024-03-05 PROCEDURE — 87637 SARSCOV2&INF A&B&RSV AMP PRB: CPT | Performed by: INTERNAL MEDICINE

## 2024-03-05 PROCEDURE — 258N000003 HC RX IP 258 OP 636: Performed by: EMERGENCY MEDICINE

## 2024-03-05 PROCEDURE — 258N000003 HC RX IP 258 OP 636: Performed by: INTERNAL MEDICINE

## 2024-03-05 PROCEDURE — 80076 HEPATIC FUNCTION PANEL: CPT | Performed by: EMERGENCY MEDICINE

## 2024-03-05 PROCEDURE — 71260 CT THORAX DX C+: CPT | Mod: MG

## 2024-03-05 PROCEDURE — 250N000011 HC RX IP 250 OP 636: Performed by: EMERGENCY MEDICINE

## 2024-03-05 PROCEDURE — 96366 THER/PROPH/DIAG IV INF ADDON: CPT

## 2024-03-05 PROCEDURE — 99223 1ST HOSP IP/OBS HIGH 75: CPT | Performed by: INTERNAL MEDICINE

## 2024-03-05 PROCEDURE — 250N000013 HC RX MED GY IP 250 OP 250 PS 637: Performed by: INTERNAL MEDICINE

## 2024-03-05 PROCEDURE — 82805 BLOOD GASES W/O2 SATURATION: CPT | Performed by: EMERGENCY MEDICINE

## 2024-03-05 PROCEDURE — 82374 ASSAY BLOOD CARBON DIOXIDE: CPT | Performed by: EMERGENCY MEDICINE

## 2024-03-05 PROCEDURE — 83605 ASSAY OF LACTIC ACID: CPT | Performed by: EMERGENCY MEDICINE

## 2024-03-05 PROCEDURE — 250N000011 HC RX IP 250 OP 636: Performed by: INTERNAL MEDICINE

## 2024-03-05 PROCEDURE — 99285 EMERGENCY DEPT VISIT HI MDM: CPT | Mod: 25

## 2024-03-05 PROCEDURE — 120N000001 HC R&B MED SURG/OB

## 2024-03-05 PROCEDURE — 96368 THER/DIAG CONCURRENT INF: CPT

## 2024-03-05 RX ORDER — DIPHENHYDRAMINE HYDROCHLORIDE 50 MG/ML
50 INJECTION INTRAMUSCULAR; INTRAVENOUS ONCE
Status: COMPLETED | OUTPATIENT
Start: 2024-03-05 | End: 2024-03-05

## 2024-03-05 RX ORDER — TOPIRAMATE 100 MG/1
200 TABLET, FILM COATED ORAL 2 TIMES DAILY
Status: DISCONTINUED | OUTPATIENT
Start: 2024-03-05 | End: 2024-03-11 | Stop reason: HOSPADM

## 2024-03-05 RX ORDER — ACETAMINOPHEN 325 MG/1
650 TABLET ORAL EVERY 4 HOURS PRN
Status: DISCONTINUED | OUTPATIENT
Start: 2024-03-05 | End: 2024-03-05

## 2024-03-05 RX ORDER — AMOXICILLIN 250 MG
2 CAPSULE ORAL 2 TIMES DAILY PRN
Status: DISCONTINUED | OUTPATIENT
Start: 2024-03-05 | End: 2024-03-05

## 2024-03-05 RX ORDER — DIPHENHYDRAMINE HYDROCHLORIDE 50 MG/ML
12.5 INJECTION INTRAMUSCULAR; INTRAVENOUS EVERY 6 HOURS PRN
Status: DISCONTINUED | OUTPATIENT
Start: 2024-03-05 | End: 2024-03-11 | Stop reason: HOSPADM

## 2024-03-05 RX ORDER — WATER 10 ML/10ML
300 INJECTION INTRAMUSCULAR; INTRAVENOUS; SUBCUTANEOUS 4 TIMES DAILY
Status: DISCONTINUED | OUTPATIENT
Start: 2024-03-05 | End: 2024-03-05

## 2024-03-05 RX ORDER — NYSTATIN 100000 U/G
CREAM TOPICAL 3 TIMES DAILY PRN
Status: DISCONTINUED | OUTPATIENT
Start: 2024-03-05 | End: 2024-03-11 | Stop reason: HOSPADM

## 2024-03-05 RX ORDER — CALCIUM CARBONATE 500(1250)
1 TABLET ORAL 2 TIMES DAILY
Status: DISCONTINUED | OUTPATIENT
Start: 2024-03-05 | End: 2024-03-11 | Stop reason: HOSPADM

## 2024-03-05 RX ORDER — METRONIDAZOLE 500 MG/100ML
500 INJECTION, SOLUTION INTRAVENOUS EVERY 8 HOURS
Status: DISCONTINUED | OUTPATIENT
Start: 2024-03-05 | End: 2024-03-07

## 2024-03-05 RX ORDER — AMOXICILLIN 250 MG
1 CAPSULE ORAL 2 TIMES DAILY PRN
Status: DISCONTINUED | OUTPATIENT
Start: 2024-03-05 | End: 2024-03-11 | Stop reason: HOSPADM

## 2024-03-05 RX ORDER — DIAZEPAM ORAL SOLUTION (CONCENTRATE) 5 MG/ML
5 SOLUTION ORAL EVERY 8 HOURS PRN
Status: DISCONTINUED | OUTPATIENT
Start: 2024-03-05 | End: 2024-03-11 | Stop reason: HOSPADM

## 2024-03-05 RX ORDER — POLYETHYLENE GLYCOL 3350 17 G/17G
17 POWDER, FOR SOLUTION ORAL ONCE
Status: COMPLETED | OUTPATIENT
Start: 2024-03-05 | End: 2024-03-05

## 2024-03-05 RX ORDER — CEFEPIME HYDROCHLORIDE 2 G/1
2 INJECTION, POWDER, FOR SOLUTION INTRAVENOUS EVERY 8 HOURS
Status: DISCONTINUED | OUTPATIENT
Start: 2024-03-05 | End: 2024-03-05 | Stop reason: ALTCHOICE

## 2024-03-05 RX ORDER — CEFEPIME HYDROCHLORIDE 2 G/1
2 INJECTION, POWDER, FOR SOLUTION INTRAVENOUS ONCE
Status: COMPLETED | OUTPATIENT
Start: 2024-03-05 | End: 2024-03-05

## 2024-03-05 RX ORDER — ACETAMINOPHEN 325 MG/10.15ML
15 LIQUID ORAL ONCE
Status: COMPLETED | OUTPATIENT
Start: 2024-03-05 | End: 2024-03-05

## 2024-03-05 RX ORDER — GUAIFENESIN/DEXTROMETHORPHAN 100-10MG/5
10 SYRUP ORAL EVERY 6 HOURS PRN
Status: DISCONTINUED | OUTPATIENT
Start: 2024-03-05 | End: 2024-03-11 | Stop reason: HOSPADM

## 2024-03-05 RX ORDER — METHYLCELLULOSE 2 G/19G
POWDER, FOR SOLUTION ORAL
COMMUNITY
Start: 2024-01-29

## 2024-03-05 RX ORDER — LAMOTRIGINE 25 MG/1
75 TABLET ORAL 2 TIMES DAILY
Status: DISCONTINUED | OUTPATIENT
Start: 2024-03-05 | End: 2024-03-11 | Stop reason: HOSPADM

## 2024-03-05 RX ORDER — POLYETHYLENE GLYCOL 3350 17 G/17G
17 POWDER, FOR SOLUTION ORAL 2 TIMES DAILY
Status: DISCONTINUED | OUTPATIENT
Start: 2024-03-05 | End: 2024-03-11 | Stop reason: HOSPADM

## 2024-03-05 RX ORDER — POLYETHYLENE GLYCOL 3350 17 G/17G
17 POWDER, FOR SOLUTION ORAL 2 TIMES DAILY
Status: DISCONTINUED | OUTPATIENT
Start: 2024-03-05 | End: 2024-03-05

## 2024-03-05 RX ORDER — ACETAMINOPHEN 650 MG/1
650 SUPPOSITORY RECTAL EVERY 4 HOURS PRN
Status: DISCONTINUED | OUTPATIENT
Start: 2024-03-05 | End: 2024-03-11 | Stop reason: HOSPADM

## 2024-03-05 RX ORDER — AMOXICILLIN 250 MG
1 CAPSULE ORAL 2 TIMES DAILY PRN
Status: DISCONTINUED | OUTPATIENT
Start: 2024-03-05 | End: 2024-03-05

## 2024-03-05 RX ORDER — CALCIUM CARBONATE 500(1250)
1 TABLET ORAL 2 TIMES DAILY
COMMUNITY

## 2024-03-05 RX ORDER — ONDANSETRON 2 MG/ML
4 INJECTION INTRAMUSCULAR; INTRAVENOUS EVERY 6 HOURS PRN
Status: DISCONTINUED | OUTPATIENT
Start: 2024-03-05 | End: 2024-03-11 | Stop reason: HOSPADM

## 2024-03-05 RX ORDER — NYSTATIN 100000 U/G
CREAM TOPICAL 2 TIMES DAILY
Status: DISCONTINUED | OUTPATIENT
Start: 2024-03-05 | End: 2024-03-11 | Stop reason: HOSPADM

## 2024-03-05 RX ORDER — ONDANSETRON 4 MG/1
4 TABLET, ORALLY DISINTEGRATING ORAL EVERY 6 HOURS PRN
Status: DISCONTINUED | OUTPATIENT
Start: 2024-03-05 | End: 2024-03-05

## 2024-03-05 RX ORDER — CARBOXYMETHYLCELLULOSE SODIUM 5 MG/ML
1 SOLUTION/ DROPS OPHTHALMIC AT BEDTIME
Status: DISCONTINUED | OUTPATIENT
Start: 2024-03-05 | End: 2024-03-11 | Stop reason: HOSPADM

## 2024-03-05 RX ORDER — POLYETHYLENE GLYCOL 3350 17 G/17G
17 POWDER, FOR SOLUTION ORAL 2 TIMES DAILY PRN
Status: DISCONTINUED | OUTPATIENT
Start: 2024-03-05 | End: 2024-03-05

## 2024-03-05 RX ORDER — CEFTRIAXONE 1 G/1
1 INJECTION, POWDER, FOR SOLUTION INTRAMUSCULAR; INTRAVENOUS EVERY 24 HOURS
Status: DISCONTINUED | OUTPATIENT
Start: 2024-03-05 | End: 2024-03-10

## 2024-03-05 RX ORDER — IOPAMIDOL 755 MG/ML
50 INJECTION, SOLUTION INTRAVASCULAR ONCE
Status: COMPLETED | OUTPATIENT
Start: 2024-03-05 | End: 2024-03-05

## 2024-03-05 RX ORDER — LEVOTHYROXINE SODIUM 25 UG/1
50 TABLET ORAL EVERY EVENING
Status: DISCONTINUED | OUTPATIENT
Start: 2024-03-05 | End: 2024-03-11 | Stop reason: HOSPADM

## 2024-03-05 RX ORDER — GUAIFENESIN AND DEXTROMETHORPHAN HYDROBROMIDE 100; 10 MG/5ML; MG/5ML
10 SOLUTION ORAL EVERY 6 HOURS PRN
Status: DISCONTINUED | OUTPATIENT
Start: 2024-03-05 | End: 2024-03-05

## 2024-03-05 RX ORDER — OMEPRAZOLE 40 MG/1
20 CAPSULE, DELAYED RELEASE ORAL DAILY
COMMUNITY
Start: 2024-01-22

## 2024-03-05 RX ORDER — LEVETIRACETAM 100 MG/ML
1000 SOLUTION ORAL 2 TIMES DAILY
Status: DISCONTINUED | OUTPATIENT
Start: 2024-03-05 | End: 2024-03-11 | Stop reason: HOSPADM

## 2024-03-05 RX ORDER — LAMOTRIGINE 100 MG/1
200 TABLET ORAL 2 TIMES DAILY
Status: DISCONTINUED | OUTPATIENT
Start: 2024-03-05 | End: 2024-03-11 | Stop reason: HOSPADM

## 2024-03-05 RX ORDER — DEXTROSE MONOHYDRATE, SODIUM CHLORIDE, AND POTASSIUM CHLORIDE 50; 1.49; 4.5 G/1000ML; G/1000ML; G/1000ML
INJECTION, SOLUTION INTRAVENOUS CONTINUOUS
Status: DISPENSED | OUTPATIENT
Start: 2024-03-05 | End: 2024-03-06

## 2024-03-05 RX ORDER — AMOXICILLIN 250 MG
2 CAPSULE ORAL 2 TIMES DAILY PRN
Status: DISCONTINUED | OUTPATIENT
Start: 2024-03-05 | End: 2024-03-11 | Stop reason: HOSPADM

## 2024-03-05 RX ORDER — ACETAMINOPHEN 325 MG/1
650 TABLET ORAL EVERY 4 HOURS PRN
Status: DISCONTINUED | OUTPATIENT
Start: 2024-03-05 | End: 2024-03-11 | Stop reason: HOSPADM

## 2024-03-05 RX ORDER — FOLIC ACID 1 MG/1
1 TABLET ORAL DAILY
Status: DISCONTINUED | OUTPATIENT
Start: 2024-03-06 | End: 2024-03-11 | Stop reason: HOSPADM

## 2024-03-05 RX ORDER — CEFAZOLIN SODIUM 1 G/50ML
750 SOLUTION INTRAVENOUS ONCE
Status: COMPLETED | OUTPATIENT
Start: 2024-03-05 | End: 2024-03-05

## 2024-03-05 RX ORDER — DESONIDE 0.5 MG/G
CREAM TOPICAL 2 TIMES DAILY
Status: DISCONTINUED | OUTPATIENT
Start: 2024-03-05 | End: 2024-03-11 | Stop reason: HOSPADM

## 2024-03-05 RX ORDER — ENOXAPARIN SODIUM 100 MG/ML
30 INJECTION SUBCUTANEOUS EVERY 24 HOURS
Status: DISCONTINUED | OUTPATIENT
Start: 2024-03-05 | End: 2024-03-11 | Stop reason: HOSPADM

## 2024-03-05 RX ORDER — CALCIUM CARBONATE 500 MG/1
1000 TABLET, CHEWABLE ORAL 4 TIMES DAILY PRN
Status: DISCONTINUED | OUTPATIENT
Start: 2024-03-05 | End: 2024-03-05

## 2024-03-05 RX ORDER — BISACODYL 10 MG
10 SUPPOSITORY, RECTAL RECTAL
Status: DISCONTINUED | OUTPATIENT
Start: 2024-03-06 | End: 2024-03-11 | Stop reason: HOSPADM

## 2024-03-05 RX ORDER — ENOXAPARIN SODIUM 100 MG/ML
40 INJECTION SUBCUTANEOUS EVERY 24 HOURS
Status: DISCONTINUED | OUTPATIENT
Start: 2024-03-05 | End: 2024-03-05 | Stop reason: DRUGHIGH

## 2024-03-05 RX ORDER — WATER 10 ML/10ML
INJECTION INTRAMUSCULAR; INTRAVENOUS; SUBCUTANEOUS 4 TIMES DAILY
COMMUNITY
Start: 2024-02-26

## 2024-03-05 RX ORDER — ACETAMINOPHEN 650 MG/1
650 SUPPOSITORY RECTAL EVERY 4 HOURS PRN
Status: DISCONTINUED | OUTPATIENT
Start: 2024-03-05 | End: 2024-03-05

## 2024-03-05 RX ADMIN — TOPIRAMATE 200 MG: 100 TABLET, FILM COATED ORAL at 21:48

## 2024-03-05 RX ADMIN — SODIUM CHLORIDE 633 ML: 9 INJECTION, SOLUTION INTRAVENOUS at 14:37

## 2024-03-05 RX ADMIN — CEFTRIAXONE SODIUM 1 G: 1 INJECTION, POWDER, FOR SOLUTION INTRAMUSCULAR; INTRAVENOUS at 23:04

## 2024-03-05 RX ADMIN — NYSTATIN: 100000 CREAM TOPICAL at 21:49

## 2024-03-05 RX ADMIN — Medication 1 DROP: at 23:06

## 2024-03-05 RX ADMIN — LAMOTRIGINE 75 MG: 25 TABLET ORAL at 21:47

## 2024-03-05 RX ADMIN — DIPHENHYDRAMINE HYDROCHLORIDE 50 MG: 50 INJECTION, SOLUTION INTRAMUSCULAR; INTRAVENOUS at 18:34

## 2024-03-05 RX ADMIN — METRONIDAZOLE 500 MG: 500 INJECTION, SOLUTION INTRAVENOUS at 23:43

## 2024-03-05 RX ADMIN — ENOXAPARIN SODIUM 30 MG: 30 INJECTION SUBCUTANEOUS at 18:35

## 2024-03-05 RX ADMIN — DESONIDE: 0.5 CREAM TOPICAL at 23:06

## 2024-03-05 RX ADMIN — POTASSIUM CHLORIDE, DEXTROSE MONOHYDRATE AND SODIUM CHLORIDE: 150; 5; 450 INJECTION, SOLUTION INTRAVENOUS at 19:00

## 2024-03-05 RX ADMIN — VANCOMYCIN HYDROCHLORIDE 750 MG: 5 INJECTION, POWDER, LYOPHILIZED, FOR SOLUTION INTRAVENOUS at 16:11

## 2024-03-05 RX ADMIN — LAMOTRIGINE 200 MG: 100 TABLET ORAL at 21:47

## 2024-03-05 RX ADMIN — IOPAMIDOL 50 ML: 755 INJECTION, SOLUTION INTRAVENOUS at 15:22

## 2024-03-05 RX ADMIN — LEVETIRACETAM 1000 MG: 100 SOLUTION ORAL at 21:47

## 2024-03-05 RX ADMIN — CEFEPIME HYDROCHLORIDE 2 G: 2 INJECTION, POWDER, FOR SOLUTION INTRAVENOUS at 14:37

## 2024-03-05 RX ADMIN — POLYETHYLENE GLYCOL 3350 17 G: 17 POWDER, FOR SOLUTION ORAL at 21:49

## 2024-03-05 RX ADMIN — LEVOTHYROXINE SODIUM 50 MCG: 0.03 TABLET ORAL at 21:48

## 2024-03-05 RX ADMIN — POLYETHYLENE GLYCOL 3350 17 G: 17 POWDER, FOR SOLUTION ORAL at 18:35

## 2024-03-05 RX ADMIN — ACETAMINOPHEN 650 MG: 325 SOLUTION ORAL at 14:44

## 2024-03-05 ASSESSMENT — ACTIVITIES OF DAILY LIVING (ADL)
ADLS_ACUITY_SCORE: 35
ADLS_ACUITY_SCORE: 39
ADLS_ACUITY_SCORE: 35
ADLS_ACUITY_SCORE: 51
ADLS_ACUITY_SCORE: 35

## 2024-03-05 NOTE — MEDICATION SCRIBE - ADMISSION MEDICATION HISTORY
Medication Scribe Admission Medication History    Admission medication history is complete. The information provided in this note is only as accurate as the sources available at the time of the update.    Information Source(s): Caregiver, Facility (TCU/NH/) medication list/MAR, and CareEverywhere/SureScripts via in-person    Pertinent Information: care giver provided mar from [facility] on [facility] [phone]    Changes made to PTA medication list:  Added: None  Deleted: None  Changed: None    Allergies reviewed with patient and updates made in EHR: yes    Medication History Completed By: EVERT SHEA 3/5/2024 3:45 PM    PTA Med List   Medication Sig Last Dose    acetaminophen (TYLENOL) 325 MG tablet Take 650 mg by mouth every 4 hours as needed for mild pain As needed for pain, discomfort, and fever.  Not to exceed 3000 mg in 24 hours. Unknown at prn    arginine (ARGINAID) PACK Take 1 packet by mouth 2 times daily Per G-tube mixed with 4 ounces of water 3/5/2024 at am    bisacodyl (DULCOLAX) 10 MG suppository Place 10 mg rectally every other day 3/4/2024 at pm    calcium carbonate (OS-NATHALIA) 500 MG TABS 1 tablet by Per Feeding Tube route 2 times daily crushed 3/5/2024 at am    carboxymethylcellulose PF (REFRESH PLUS) 0.5 % SOLN ophthalmic solution 1 drop At Bedtime 3/4/2024 at pm    desonide (DESOWEN) 0.05 % external cream Apply topically 2 times daily .  Hold if no red, itchy, scaly areas on face. 3/5/2024 at am    Dextromethorphan-guaiFENesin  MG/5ML syrup Take 10 mLs by mouth every 6 hours as needed for cough Unknown at prn    diazepam (VALIUM) 5 MG/ML (HIGH CONC) solution Take 5 mg by mouth every 8 hours as needed for anxiety Past Week    diphenhydrAMINE (BENADRYL) 12.5 MG/5ML solution Take 25 mg by mouth every 4 hours as needed for sleep  Unknown at prn    folic acid (FOLVITE) 1 MG tablet Take 1 mg by mouth daily Per G- tube 3/5/2024 at am    lamoTRIgine (LAMICTAL) 200 MG TBDP ODT tab DISSOLVE 1 TABLET IN  5-10ML OF WATER AND TAKE PER G-TUBE TWICE DAILY ALONG WITH 75MG FOR TOTAL DOSE 275MG 3/5/2024 at am    lamoTRIgine (LAMICTAL) 25 MG chewable tablet 3 tablets (75 mg) by Per G Tube route 2 times daily 3/5/2024 at am    levETIRAcetam (KEPPRA) 100 MG/ML oral solution GIVE 10ML (1000MG) PER G-TUBE TWICE DAILY 3/5/2024 at am    levothyroxine (SYNTHROID/LEVOTHROID) 50 MCG tablet Take 50 mcg by mouth daily Per G-tube 3/4/2024 at pm    multivitamin w/minerals (THERA-VIT-M) tablet Take 1 tablet by mouth daily Per G-tube 3/5/2024 at am    nystatin (MYCOSTATIN) 818088 UNIT/GM external cream Apply topically 3 times daily as needed for other (TID To groin, and two times daily to G-tube site) 3/5/2024 at am    omeprazole (PRILOSEC) 40 MG DR capsule 20 mg daily Per g tube 3/5/2024 at am    protein modular, BENEPROTEIN, PACK 7 g by Per Feeding Tube route 2 times daily 3/5/2024 at am    Psyllium (GENFIBER PO) Take 2 Tablespoonful by mouth daily Per G-tube with 4 ounces of water flush 3/5/2024 at am    Sodium Phosphates (FLEET ENEMA RE) Place rectally every other day If no results from suppository 3/4/2024 at pm    SOLUBLE FIBER THERAPY powder Mix one tablespoonful in liquid and give per G-tube once daily 3/4/2024 at pm    topiramate (TOPAMAX) 200 MG tablet TAKE 1 TABLET PER G-TUBE TWICE DAILY 3/5/2024 at am    VITAMIN D (CHOLECALCIFEROL) PO Take 400 Units by mouth daily 2.5ml qd 3/5/2024 at am    Water For Injection Sterile (STERILE WATER, PRESERVATIVE FREE,) injection 4 times daily 300cc per j- tube 3/5/2024 at am

## 2024-03-05 NOTE — ED NOTES
Expected Patient Referral to ED  11:53 AM    Referring Clinic/Provider:  [facility] Dempsey    Reason for referral/Clinical facts:  Hx of cerebral palsy from group home.  Found to be a little tachy and hypoxic - 120 HR, RA O2 sats of 91%.  Pt has a G tube for all oral intake. Pt afebrile, but sounds wheezy. No workup done/completed. Sending to us for further evaluation and cares.      Recommendations provided:  Send to ED for further evaluation    Caller was informed that this institution does possess the capabilities and/or resources to provide for patient and should be transferred to our facility.    Discussed that if direct admit is sought and any hurdles are encountered, this ED would be happy to see the patient and evaluate.    Informed caller that recommendations provided are recommendations based only on the facts provided and that they responsible to accept or reject the advice, or to seek a formal in person consultation as needed and that this ED will see/treat patient should they arrive.      Domenico Fletcher, [facility] EMERGENCY DEPARTMENT  [address] [place] [id number] [phone]       Domenico Fletcher MD  03/05/24 [id number]

## 2024-03-05 NOTE — H&P
[facility]    History and Physical - Hospitalist Service       Date of Admission:  3/5/2024    Assessment & Plan   Principal Problem:    Pneumonia due to infectious organism, unspecified laterality, unspecified part of lung  Active Problems:    Intellectual disability    Idiopathic generalized epilepsy, intractable (H)    Gastroesophageal reflux disease without esophagitis    Gastrostomy present (H)    Neurogenic bladder    History of cerebral palsy    Sepsis, due to unspecified organism, unspecified whether acute organ dysfunction present (H)    Stercoral colitis       Elysia Arellano is a 58 year old female with history of cerebral palsy, developmental delay, nonverbal, neurogenic bowel and bladder, seizure disorder, hypothyroidism and chronic dysphagia admitted on 3/5/2024 with acute hypoxic respiratory failure and sepsis likely secondary to aspiration pneumonia.      Sepsis  Acute hypoxemic respiratory failure  Suspect secondary to aspiration pneumonia  CT on admission shows extensive endobronchial debris left main bronchus and left lung concerning for mucous plugging and/or aspiration with associated consolidation   -- received IV cefepime given PCN allergy however there is risk of neurotoxicity and in setting of seizure disorder, will change to IV ceftriaxone and flagyl to cover aspiration PNA  -- IV hydration  -- Follow-up blood culture results  -- Check UA/UC for completeness  -- continue oxygen as needed       Vancomycin allergy: initially started on IV vanco as part of sepsis protocol however developed rash  -- stop vanco  -- allergy added  -- PRN benadryl given  -- monitor  -- MRSA swab already pending      Imaging evidence of possible stercoral colitis  Known history of neurogenic bowel related to cerebral palsy  -- MiraLAX twice daily, senna as needed, escalate bowel regimen as needed      Known history of oropharyngeal dysphagia  Has G-tube in place and is on chronic tube  "feeds  -- Strict n.p.o. Patient has been strict NPO for many years  -- Nutrition consult to resume tube feeds      Mild hypernatremia:  -- Continue water flushes per home routine  -- Placed on hypotonic IV fluids overnight      Hypokalemia:  -- Replacement protocol  -- Placed on potassium containing IV fluids      Mild hypercalcemia: likely related to dehydration and home scheduled Oscal  -- hold home Oscal  -- trend for improvement with IVF      Mild troponin elevation consistent with demand ischemia  -- Recheck troponin only 30 compared to 29 on admission, EKG with sinus tachycardia  -- Stop trending troponin  -- Treat sepsis as above      Seizure disorder: Continue home Lamictal and Keppra      Hypothyroidism: continue home replacement, TSH acceptable      GERD: Continue home PPI      Malnutrition:   -- nutrition consulted        Diet: NPO for Medical/Clinical Reasons Except for: NPO but receiving Tube Feeding    DVT Prophylaxis: Enoxaparin (Lovenox) SQ  [place]jo Catheter: Not present  Lines: None     Cardiac Monitoring: None  Code Status: No CPR- Do NOT Intubate      Clinically Significant Risk Factors Present on Admission        # Hypokalemia: Lowest K = 3.2 mmol/L in last 2 days, will replace as needed  # Hypernatremia: Highest Na = 146 mmol/L in last 2 days, will monitor as appropriate   # Hypercalcemia: Highest Ca = 10.3 mg/dL in last 2 days, will monitor as appropriate             # Cachexia: Estimated body mass index is 15.96 kg/m  as calculated from the following:    Height as of this encounter: 1.626 m (5' 4\").    Weight as of this encounter: 42.2 kg (93 lb).              Disposition Plan      Expected Discharge Date: 03/07/2024                  Rubén Carranza DO  Hospitalist Service  [facility]  Securely message with Sang (more info)  Text page via [facility] Paging/Directory     ______________________________________________________________________    Chief Complaint "   Hypoxia    Unable to obtain a history from the patient due to baseline mental status and nonverbal at baseline    History of Present Illness   Elysia Arellano is a 58 year old female who presents from her group home with hypoxia and tachycardia with cough.       Past Medical History    Past Medical History:   Diagnosis Date    Cerebral palsy (H)     Depression     GERD (gastroesophageal reflux disease)     Hyperopia     Mental retardation     Osteoporosis     Pneumonia due to 2019 novel coronavirus 8/7/2020    Pyelonephritis     Scoliosis     Seizure disorder (H)     UTI (urinary tract infection)        Past Surgical History   Past Surgical History:   Procedure Laterality Date    AMPUTATE TOE(S) Right 11/21/2019    Procedure: AMPUTATION, TOE fifth toe right foot;  Surgeon: Wade Garcia DPM;  [place] [facility] [place]  Service: [place]iatry    BACK SURGERY      IR ABSCESS TUBE CHANGE  6/27/2000    IR ABSCESS TUBE CHANGE  9/5/2000    IR ABSCESS TUBE CHANGE  3/15/2001    IR ABSCESS TUBE CHANGE  7/6/2001    IR ABSCESS TUBE CHANGE  9/4/2001    IR GASTRO JEJUNOSTOMY TUBE CHANGE  8/30/2009    IR GASTRO JEJUNOSTOMY TUBE CHANGE  8/5/2014    IR GASTRO JEJUNOSTOMY TUBE CHANGE  11/5/2014    IR GASTRO JEJUNOSTOMY TUBE CHANGE  2/18/2015    IR GASTRO JEJUNOSTOMY TUBE CHANGE  5/18/2015    IR GASTRO JEJUNOSTOMY TUBE CHANGE  8/17/2015    IR GASTRO JEJUNOSTOMY TUBE CHANGE  10/28/2015    IR GASTRO JEJUNOSTOMY TUBE CHANGE  1/16/2016    IR GASTRO JEJUNOSTOMY TUBE CHANGE  4/14/2016    IR GASTRO JEJUNOSTOMY TUBE CHANGE  5/13/2016    IR GASTRO JEJUNOSTOMY TUBE CHANGE  6/16/2016    IR GASTRO JEJUNOSTOMY TUBE CHANGE  7/20/2016    IR GASTRO JEJUNOSTOMY TUBE CHANGE  9/7/2016    IR GASTRO JEJUNOSTOMY TUBE CHANGE  10/24/2016    IR GASTRO JEJUNOSTOMY TUBE CHANGE  1/8/2017    IR GASTRO JEJUNOSTOMY TUBE CHANGE  3/19/2017    IR GASTRO JEJUNOSTOMY TUBE CHANGE  4/25/2017    IR GASTRO JEJUNOSTOMY TUBE CHANGE  6/10/2017    IR GASTRO JEJUNOSTOMY  TUBE CHANGE  9/11/2017    IR GASTRO JEJUNOSTOMY TUBE CHANGE  12/11/2017    IR GASTRO JEJUNOSTOMY TUBE CHANGE  2/12/2018    IR GASTRO JEJUNOSTOMY TUBE CHANGE  5/16/2018    IR GASTRO JEJUNOSTOMY TUBE CHANGE  8/27/2018    IR GASTRO JEJUNOSTOMY TUBE CHANGE  11/13/2018    IR GASTRO JEJUNOSTOMY TUBE CHANGE  1/8/2019    IR GASTRO JEJUNOSTOMY TUBE CHANGE  2/6/2019    IR GASTRO JEJUNOSTOMY TUBE CHANGE  4/29/2019    IR GASTRO JEJUNOSTOMY TUBE CHANGE  6/25/2019    IR GASTRO JEJUNOSTOMY TUBE CHANGE  9/24/2019    IR GASTRO JEJUNOSTOMY TUBE CHANGE  1/29/2020    IR GASTRO JEJUNOSTOMY TUBE CHANGE  5/6/2020    IR GASTRO JEJUNOSTOMY TUBE CHANGE  8/3/2020    IR GASTRO JEJUNOSTOMY TUBE CHANGE  11/10/2020    IR GASTRO JEJUNOSTOMY TUBE CHANGE  2/11/2021    IR GASTRO JEJUNOSTOMY TUBE CHANGE  5/17/2021    IR GASTRO JEJUNOSTOMY TUBE CHANGE  8/16/2021    IR GASTRO JEJUNOSTOMY TUBE CHANGE  11/19/2021    IR GASTRO JEJUNOSTOMY TUBE CHANGE  2/22/2022    IR GASTRO JEJUNOSTOMY TUBE CHANGE  7/22/2022    IR GASTRO JEJUNOSTOMY TUBE CHANGE  8/12/2022    IR GASTRO JEJUNOSTOMY TUBE CHANGE  11/17/2022    IR GASTRO JEJUNOSTOMY TUBE CHANGE  2/14/2023    IR GASTRO JEJUNOSTOMY TUBE CHANGE  4/19/2023    IR GASTRO JEJUNOSTOMY TUBE CHANGE  6/9/2023    IR GASTRO JEJUNOSTOMY TUBE CHANGE  6/8/2023    IR GASTRO JEJUNOSTOMY TUBE CHANGE  8/25/2023    IR GASTRO JEJUNOSTOMY TUBE CHANGE  10/26/2023    IR GASTRO JEJUNOSTOMY TUBE CHANGE  12/4/2023    IR GASTRO JEJUNOSTOMY TUBE CHANGE  1/31/2024    IR GASTRO JEJUNOSTOMY TUBE PLACEMENT      IR GASTROSTOMY TUBE CHANGE  7/18/2002    IR GJ TUBE REPLACEMENT  11/13/2018    IR GJ TUBE REPLACEMENT  1/8/2019    IR GJ TUBE REPLACEMENT  2/6/2019    IR GJ TUBE REPLACEMENT  4/29/2019    IR GJ TUBE REPLACEMENT  6/25/2019    IR GJ TUBE REPLACEMENT  9/24/2019    IR GJ TUBE REPLACEMENT  1/29/2020    IR GJ TUBE REPLACEMENT  5/6/2020    IR GJ TUBE REPLACEMENT  8/3/2020    IR GJ TUBE REPLACEMENT  11/10/2020    IR GJ TUBE REPLACEMENT   2/11/2021    IR GJ TUBE REPLACEMENT  5/17/2021    IR MISCELLANEOUS PROCEDURE  6/19/2000    IR MISCELLANEOUS PROCEDURE  12/22/2001    IR MISCELLANEOUS PROCEDURE  1/7/2002    IR MISCELLANEOUS PROCEDURE  1/19/2002    IR MISCELLANEOUS PROCEDURE  1/19/2002    IR MISCELLANEOUS PROCEDURE  2/20/2002    IR MISCELLANEOUS PROCEDURE  2/20/2002    IR MISCELLANEOUS PROCEDURE  3/19/2002    IR MISCELLANEOUS PROCEDURE  4/16/2002    IR MISCELLANEOUS PROCEDURE  7/11/2002    IR MISCELLANEOUS PROCEDURE  7/11/2002    IR MISCELLANEOUS PROCEDURE  7/30/2002    IR MISCELLANEOUS PROCEDURE  8/16/2002    IR MISCELLANEOUS PROCEDURE  8/31/2002    IR MISCELLANEOUS PROCEDURE  9/18/2002    IR MISCELLANEOUS PROCEDURE  3/1/2003    IR MISCELLANEOUS PROCEDURE  8/5/2003    IR MISCELLANEOUS PROCEDURE  1/29/2004    IR MISCELLANEOUS PROCEDURE  3/18/2004    IR MISCELLANEOUS PROCEDURE  7/21/2004    IR MISCELLANEOUS PROCEDURE  11/15/2004    IR MISCELLANEOUS PROCEDURE  2/18/2005    IR MISCELLANEOUS PROCEDURE  4/8/2005    IR MISCELLANEOUS PROCEDURE  6/30/2005    IR MISCELLANEOUS PROCEDURE  9/30/2005    IR MISCELLANEOUS PROCEDURE  12/20/2005    IR MISCELLANEOUS PROCEDURE  3/28/2006    IR MISCELLANEOUS PROCEDURE  6/30/2006    IR MISCELLANEOUS PROCEDURE  10/30/2006    IR MISCELLANEOUS PROCEDURE  2/28/2007    IR MISCELLANEOUS PROCEDURE  6/19/2007    IR MISCELLANEOUS PROCEDURE  9/25/2007    IR MISCELLANEOUS PROCEDURE  12/10/2007    IR MISCELLANEOUS PROCEDURE  1/5/2008    IR MISCELLANEOUS PROCEDURE  6/18/2008    IR MISCELLANEOUS PROCEDURE  8/25/2008    IR MISCELLANEOUS PROCEDURE  12/18/2008    IR MISCELLANEOUS PROCEDURE  2/18/2009    IR MISCELLANEOUS PROCEDURE  8/4/2009    IR MISCELLANEOUS PROCEDURE  11/6/2009    IR MISCELLANEOUS PROCEDURE  12/28/2009    IR MISCELLANEOUS PROCEDURE  4/13/2010    IR MISCELLANEOUS PROCEDURE  6/29/2010    IR MISCELLANEOUS PROCEDURE  10/11/2010    IR MISCELLANEOUS PROCEDURE  1/3/2011    IR MISCELLANEOUS PROCEDURE  2/25/2011    IR  MISCELLANEOUS PROCEDURE  2011    IR MISCELLANEOUS PROCEDURE  2011    IR MISCELLANEOUS PROCEDURE  2011    IR MISCELLANEOUS PROCEDURE  2/15/2012    IR MISCELLANEOUS PROCEDURE  2012    IR MISCELLANEOUS PROCEDURE  2012    IR MISCELLANEOUS PROCEDURE  10/25/2012    IR MISCELLANEOUS PROCEDURE  2013    IR MISCELLANEOUS PROCEDURE  2013    IR MISCELLANEOUS PROCEDURE  2013    IR MISCELLANEOUS PROCEDURE  10/24/2013    IR MISCELLANEOUS PROCEDURE  2013    IR MISCELLANEOUS PROCEDURE  4/3/2014    IR MISCELLANEOUS PROCEDURE  2014    Scoliosis S/P Lewis Valentín Placement      SPINAL FUSION         Prior to Admission Medications   Prior to Admission Medications   Prescriptions Last Dose Informant Patient Reported? Taking?   Dextromethorphan-guaiFENesin  MG/5ML syrup Unknown at prn  Yes Yes   Sig: Take 10 mLs by mouth every 6 hours as needed for cough   Psyllium (GENFIBER PO) 3/5/2024 at am  Yes Yes   Sig: Take 2 Tablespoonful by mouth daily Per G-tube with 4 ounces of water flush   SOLUBLE FIBER THERAPY powder 3/4/2024 at pm  Yes Yes   Sig: Mix one tablespoonful in liquid and give per G-tube once daily   Sodium Phosphates (FLEET ENEMA RE) 3/4/2024 at pm  Yes Yes   Sig: Place rectally every other day If no results from suppository   VITAMIN D (CHOLECALCIFEROL) PO 3/5/2024 at am  Yes Yes   Sig: Take 400 Units by mouth daily 2.5ml qd   Water For Injection Sterile (STERILE WATER, PRESERVATIVE FREE,) injection 3/5/2024 at am  Yes Yes   Si times daily 300cc per j- tube   acetaminophen (TYLENOL) 325 MG tablet Unknown at prn  Yes Yes   Sig: Take 650 mg by mouth every 4 hours as needed for mild pain As needed for pain, discomfort, and fever.  Not to exceed 3000 mg in 24 hours.   arginine (ARGINAID) PACK 3/5/2024 at am  Yes Yes   Sig: Take 1 packet by mouth 2 times daily Per G-tube mixed with 4 ounces of water   bisacodyl (DULCOLAX) 10 MG suppository 3/4/2024 at pm  Yes Yes   Sig:  Place 10 mg rectally every other day   calcium carbonate (OS-NATHALIA) 500 MG TABS 3/5/2024 at am  Yes Yes   Si tablet by Per Feeding Tube route 2 times daily crushed   carboxymethylcellulose PF (REFRESH PLUS) 0.5 % SOLN ophthalmic solution 3/4/2024 at pm  Yes Yes   Si drop At Bedtime   desonide (DESOWEN) 0.05 % external cream 3/5/2024 at am  Yes Yes   Sig: Apply topically 2 times daily .  Hold if no red, itchy, scaly areas on face.   diazepam (VALIUM) 5 MG/ML (HIGH CONC) solution Past Week  Yes Yes   Sig: Take 5 mg by mouth every 8 hours as needed for anxiety   diphenhydrAMINE (BENADRYL) 12.5 MG/5ML solution Unknown at prn  Yes Yes   Sig: Take 25 mg by mouth every 4 hours as needed for sleep    folic acid (FOLVITE) 1 MG tablet 3/5/2024 at am  Yes Yes   Sig: Take 1 mg by mouth daily Per G- tube   lamoTRIgine (LAMICTAL) 200 MG TBDP ODT tab 3/5/2024 at am  No Yes   Sig: DISSOLVE 1 TABLET IN 5-10ML OF WATER AND TAKE PER G-TUBE TWICE DAILY ALONG WITH 75MG FOR TOTAL DOSE 275MG   lamoTRIgine (LAMICTAL) 25 MG chewable tablet 3/5/2024 at am  No Yes   Sig: 3 tablets (75 mg) by Per G Tube route 2 times daily   levETIRAcetam (KEPPRA) 100 MG/ML oral solution 3/5/2024 at am  No Yes   Sig: GIVE 10ML (1000MG) PER G-TUBE TWICE DAILY   levothyroxine (SYNTHROID/LEVOTHROID) 50 MCG tablet 3/4/2024 at pm  Yes Yes   Sig: Take 50 mcg by mouth daily Per G-tube   multivitamin w/minerals (THERA-VIT-M) tablet 3/5/2024 at am  Yes Yes   Sig: Take 1 tablet by mouth daily Per G-tube   nystatin (MYCOSTATIN) 743903 UNIT/GM external cream 3/5/2024 at am  No Yes   Sig: Apply topically 3 times daily as needed for other (TID To groin, and two times daily to G-tube site)   omeprazole (PRILOSEC) 40 MG DR capsule 3/5/2024 at am  Yes Yes   Si mg daily Per g tube   protein modular, BENEPROTEIN, PACK 3/5/2024 at am  Yes Yes   Si g by Per Feeding Tube route 2 times daily   topiramate (TOPAMAX) 200 MG tablet 3/5/2024 at am  No Yes   Sig: TAKE 1  TABLET PER G-TUBE TWICE DAILY      Facility-Administered Medications: None           Physical Exam   Vital Signs: Temp: (!) 101.1  F (38.4  C) Temp src: Rectal BP: (!) 152/85 Pulse: 107   Resp: 29 SpO2: 92 % O2 Device: None (Room air)    Weight: 93 lbs 0 oz    General Appearance: In no acute distress  RESPIRATORY: coarse breath sounds  CARDIOVASCULAR: No le edema bilat.  ABDOMEN: soft and non-tender  SKIN/HAIR/NAILS: Diffuse rash noted  NEUROLOGIC: Eyes open, non verbal, not following commands         Medical Decision Making       >75 MINUTES SPENT BY ME on the date of service doing chart review, history, exam, documentation & further activities per the note.      Data

## 2024-03-05 NOTE — ED NOTES
Patient with dry brief on- unknown last change.  Attempt x 3 for straight cath urine.  Unsuccessful.  Bladder scan done and showed 214 mLs.  Dr. Middleton updated.  Per provider, patient is to receive fluid bolus, ABX, and go to CT.  Can re-attempt after. Primary RN made aware.

## 2024-03-05 NOTE — ED PROVIDER NOTES
EMERGENCY DEPARTMENT ENCOUNTER      NAME: Elysia Arellano  AGE: 58 year old female  YOB: 1965  MRN: [id number]  EVALUATION DATE & TIME: 3/5/2024 12:41 PM    PCP: Fabricio Aguirre    ED PROVIDER: Ceasar Middleton M.D.      Chief Complaint   Patient presents with    Shortness of Breath         IMPRESSION  1. Sepsis, due to unspecified organism, unspecified whether acute organ dysfunction present (H)    2. Fever, unspecified fever cause    3. History of cerebral palsy        PLAN  - follow up CT scan and admit  - urine & viral swab pending at this time as well    ED COURSE & MEDICAL DECISION MAKING    ED Course as of 03/05/24 1514   Tue Mar 05, 2024   1500 58yoF with history of cerebral palsy (takes Keppra), nonverbal, nonambulatory, G/GJ tube dependent presenting from group home with staff for evaluation of increased work of breathing. Reports new mild dry cough this morning with mild wheezing (no history of lung disease); found to have high heart rate so went to [facility]----no testing done there and sent to the ED. Staff states she is acting baseline. No vomiting or diarrhea. No illnesses going around group home currently.    Rectal temp 101.1F on presentation with HR 110s, BP 150s/80s, RR 20, satting 91% on room air. No distress on exam with mild coarse lungs bilaterally, no respiratory distress, dry oral mucosa, no abdominal tenderness with unremarkable GJ tube site, chronic contractures to extremities with mild nontender pitting edema.    High concern for pneumonia per presentation to this point. Meets sepsis criteria with fever & tachycardia; lactic acid within normal limits and no MARK---not severe sepsis or septic shock. Given 15mL/kg IVF and cefepime+vanc (PCN allergy). CT, urine, viral swab pending at time of shift change signout to Dr. Tellez. Plan to admit after CT.           --------------------------------------------------------------------------------    --------------------------------------------------------------------------------           This patient involved a high degree of complexity in medical decision making, as significant risks were present and assessed. Recent encounters & results in medical record reviewed by me.    All workup (i.e. any EKG/labs/imaging as per charting below) reviewed and independently interpreted by me. See respective sections for details.        See additional MDM below if interested.    MEDICATIONS GIVEN IN THE EMERGENCY DEPARTMENT  Medications   vancomycin (VANCOCIN) 750 mg in sodium chloride 0.9 % 250 mL intermittent infusion (has no administration in time range)   ceFEPIme (MAXIPIME) 2 g vial to attach to  mL bag for ADULTS or 50 mL bag for PEDS (2 g Intravenous $New Bag 3/5/24 1437)   sodium chloride 0.9% BOLUS 633 mL (633 mLs Intravenous $New Bag 3/5/24 1437)   acetaminophen (TYLENOL) solution 650 mg (650 mg Per G Tube $Given 3/5/24 1444)   iopamidol (ISOVUE-370) solution 50 mL (50 mLs Intravenous $Given 3/5/24 1522)               =================================================================      HPI  Elysia Arellano is a 58 year old female with history of cerebral palsy (takes Keppra), nonverbal, nonambulatory, G/GJ tube dependent presenting from group home with staff for evaluation of increased work of breathing. Reports new mild dry cough this morning with mild wheezing (no history of lung disease); found to have high heart rate so went to [facility]----no testing done there and sent to the ED. Staff states she is acting baseline. No vomiting or diarrhea. No illnesses going around group home currently.          --------------- MEDICAL HISTORY ---------------  PAST MEDICAL HISTORY:  Reviewed by me.  Past Medical History:   Diagnosis Date    Cerebral palsy (H)     Depression     GERD (gastroesophageal reflux disease)     Hyperopia     Mental retardation     Osteoporosis     Pneumonia due to 2019 novel  coronavirus 8/7/2020    Pyelonephritis     Scoliosis     Seizure disorder (H)     UTI (urinary tract infection)      Patient Active Problem List   Diagnosis    Scoliosis    Intellectual disability    Cerebral palsy (H)    Idiopathic generalized epilepsy, intractable (H)    Gastroesophageal reflux disease without esophagitis    History of Nissen fundoplication    Osteoporosis    Absence of toe of right foot (H24)    Gastrostomy present (H)    Urinary incontinence    Retention of urine    Neurogenic bladder    CP (cerebral palsy), spastic, quadriplegic (H)    Constipation    At high risk for falls       PAST SURGICAL HISTORY:  Reviewed by me.  Past Surgical History:   Procedure Laterality Date    AMPUTATE TOE(S) Right 11/21/2019    Procedure: AMPUTATION, TOE fifth toe right foot;  Surgeon: Wade Garcia DPM;  Location: [facility];  Service: Podiatry    BACK SURGERY      IR ABSCESS TUBE CHANGE  6/27/2000    IR ABSCESS TUBE CHANGE  9/5/2000    IR ABSCESS TUBE CHANGE  3/15/2001    IR ABSCESS TUBE CHANGE  7/6/2001    IR ABSCESS TUBE CHANGE  9/4/2001    IR GASTRO JEJUNOSTOMY TUBE CHANGE  8/30/2009    IR GASTRO JEJUNOSTOMY TUBE CHANGE  8/5/2014    IR GASTRO JEJUNOSTOMY TUBE CHANGE  11/5/2014    IR GASTRO JEJUNOSTOMY TUBE CHANGE  2/18/2015    IR GASTRO JEJUNOSTOMY TUBE CHANGE  5/18/2015    IR GASTRO JEJUNOSTOMY TUBE CHANGE  8/17/2015    IR GASTRO JEJUNOSTOMY TUBE CHANGE  10/28/2015    IR GASTRO JEJUNOSTOMY TUBE CHANGE  1/16/2016    IR GASTRO JEJUNOSTOMY TUBE CHANGE  4/14/2016    IR GASTRO JEJUNOSTOMY TUBE CHANGE  5/13/2016    IR GASTRO JEJUNOSTOMY TUBE CHANGE  6/16/2016    IR GASTRO JEJUNOSTOMY TUBE CHANGE  7/20/2016    IR GASTRO JEJUNOSTOMY TUBE CHANGE  9/7/2016    IR GASTRO JEJUNOSTOMY TUBE CHANGE  10/24/2016    IR GASTRO JEJUNOSTOMY TUBE CHANGE  1/8/2017    IR GASTRO JEJUNOSTOMY TUBE CHANGE  3/19/2017    IR GASTRO JEJUNOSTOMY TUBE CHANGE  4/25/2017    IR GASTRO JEJUNOSTOMY TUBE CHANGE  6/10/2017    IR GASTRO  JEJUNOSTOMY TUBE CHANGE  9/11/2017    IR GASTRO JEJUNOSTOMY TUBE CHANGE  12/11/2017    IR GASTRO JEJUNOSTOMY TUBE CHANGE  2/12/2018    IR GASTRO JEJUNOSTOMY TUBE CHANGE  5/16/2018    IR GASTRO JEJUNOSTOMY TUBE CHANGE  8/27/2018    IR GASTRO JEJUNOSTOMY TUBE CHANGE  11/13/2018    IR GASTRO JEJUNOSTOMY TUBE CHANGE  1/8/2019    IR GASTRO JEJUNOSTOMY TUBE CHANGE  2/6/2019    IR GASTRO JEJUNOSTOMY TUBE CHANGE  4/29/2019    IR GASTRO JEJUNOSTOMY TUBE CHANGE  6/25/2019    IR GASTRO JEJUNOSTOMY TUBE CHANGE  9/24/2019    IR GASTRO JEJUNOSTOMY TUBE CHANGE  1/29/2020    IR GASTRO JEJUNOSTOMY TUBE CHANGE  5/6/2020    IR GASTRO JEJUNOSTOMY TUBE CHANGE  8/3/2020    IR GASTRO JEJUNOSTOMY TUBE CHANGE  11/10/2020    IR GASTRO JEJUNOSTOMY TUBE CHANGE  2/11/2021    IR GASTRO JEJUNOSTOMY TUBE CHANGE  5/17/2021    IR GASTRO JEJUNOSTOMY TUBE CHANGE  8/16/2021    IR GASTRO JEJUNOSTOMY TUBE CHANGE  11/19/2021    IR GASTRO JEJUNOSTOMY TUBE CHANGE  2/22/2022    IR GASTRO JEJUNOSTOMY TUBE CHANGE  7/22/2022    IR GASTRO JEJUNOSTOMY TUBE CHANGE  8/12/2022    IR GASTRO JEJUNOSTOMY TUBE CHANGE  11/17/2022    IR GASTRO JEJUNOSTOMY TUBE CHANGE  2/14/2023    IR GASTRO JEJUNOSTOMY TUBE CHANGE  4/19/2023    IR GASTRO JEJUNOSTOMY TUBE CHANGE  6/9/2023    IR GASTRO JEJUNOSTOMY TUBE CHANGE  6/8/2023    IR GASTRO JEJUNOSTOMY TUBE CHANGE  8/25/2023    IR GASTRO JEJUNOSTOMY TUBE CHANGE  10/26/2023    IR GASTRO JEJUNOSTOMY TUBE CHANGE  12/4/2023    IR GASTRO JEJUNOSTOMY TUBE CHANGE  1/31/2024    IR GASTRO JEJUNOSTOMY TUBE PLACEMENT      IR GASTROSTOMY TUBE CHANGE  7/18/2002    IR GJ TUBE REPLACEMENT  11/13/2018    IR GJ TUBE REPLACEMENT  1/8/2019    IR GJ TUBE REPLACEMENT  2/6/2019    IR GJ TUBE REPLACEMENT  4/29/2019    IR GJ TUBE REPLACEMENT  6/25/2019    IR GJ TUBE REPLACEMENT  9/24/2019    IR GJ TUBE REPLACEMENT  1/29/2020    IR GJ TUBE REPLACEMENT  5/6/2020    IR GJ TUBE REPLACEMENT  8/3/2020    IR GJ TUBE REPLACEMENT  11/10/2020    IR GJ TUBE  REPLACEMENT  2/11/2021    IR GJ TUBE REPLACEMENT  5/17/2021    IR MISCELLANEOUS PROCEDURE  6/19/2000    IR MISCELLANEOUS PROCEDURE  12/22/2001    IR MISCELLANEOUS PROCEDURE  1/7/2002    IR MISCELLANEOUS PROCEDURE  1/19/2002    IR MISCELLANEOUS PROCEDURE  1/19/2002    IR MISCELLANEOUS PROCEDURE  2/20/2002    IR MISCELLANEOUS PROCEDURE  2/20/2002    IR MISCELLANEOUS PROCEDURE  3/19/2002    IR MISCELLANEOUS PROCEDURE  4/16/2002    IR MISCELLANEOUS PROCEDURE  7/11/2002    IR MISCELLANEOUS PROCEDURE  7/11/2002    IR MISCELLANEOUS PROCEDURE  7/30/2002    IR MISCELLANEOUS PROCEDURE  8/16/2002    IR MISCELLANEOUS PROCEDURE  8/31/2002    IR MISCELLANEOUS PROCEDURE  9/18/2002    IR MISCELLANEOUS PROCEDURE  3/1/2003    IR MISCELLANEOUS PROCEDURE  8/5/2003    IR MISCELLANEOUS PROCEDURE  1/29/2004    IR MISCELLANEOUS PROCEDURE  3/18/2004    IR MISCELLANEOUS PROCEDURE  7/21/2004    IR MISCELLANEOUS PROCEDURE  11/15/2004    IR MISCELLANEOUS PROCEDURE  2/18/2005    IR MISCELLANEOUS PROCEDURE  4/8/2005    IR MISCELLANEOUS PROCEDURE  6/30/2005    IR MISCELLANEOUS PROCEDURE  9/30/2005    IR MISCELLANEOUS PROCEDURE  12/20/2005    IR MISCELLANEOUS PROCEDURE  3/28/2006    IR MISCELLANEOUS PROCEDURE  6/30/2006    IR MISCELLANEOUS PROCEDURE  10/30/2006    IR MISCELLANEOUS PROCEDURE  2/28/2007    IR MISCELLANEOUS PROCEDURE  6/19/2007    IR MISCELLANEOUS PROCEDURE  9/25/2007    IR MISCELLANEOUS PROCEDURE  12/10/2007    IR MISCELLANEOUS PROCEDURE  1/5/2008    IR MISCELLANEOUS PROCEDURE  6/18/2008    IR MISCELLANEOUS PROCEDURE  8/25/2008    IR MISCELLANEOUS PROCEDURE  12/18/2008    IR MISCELLANEOUS PROCEDURE  2/18/2009    IR MISCELLANEOUS PROCEDURE  8/4/2009    IR MISCELLANEOUS PROCEDURE  11/6/2009    IR MISCELLANEOUS PROCEDURE  12/28/2009    IR MISCELLANEOUS PROCEDURE  4/13/2010    IR MISCELLANEOUS PROCEDURE  6/29/2010    IR MISCELLANEOUS PROCEDURE  10/11/2010    IR MISCELLANEOUS PROCEDURE  1/3/2011    IR MISCELLANEOUS PROCEDURE  2/25/2011     IR MISCELLANEOUS PROCEDURE  5/26/2011    IR MISCELLANEOUS PROCEDURE  8/23/2011    IR MISCELLANEOUS PROCEDURE  11/17/2011    IR MISCELLANEOUS PROCEDURE  2/15/2012    IR MISCELLANEOUS PROCEDURE  5/18/2012    IR MISCELLANEOUS PROCEDURE  8/16/2012    IR MISCELLANEOUS PROCEDURE  10/25/2012    IR MISCELLANEOUS PROCEDURE  1/14/2013    IR MISCELLANEOUS PROCEDURE  4/19/2013    IR MISCELLANEOUS PROCEDURE  7/24/2013    IR MISCELLANEOUS PROCEDURE  10/24/2013    IR MISCELLANEOUS PROCEDURE  12/23/2013    IR MISCELLANEOUS PROCEDURE  4/3/2014    IR MISCELLANEOUS PROCEDURE  5/20/2014    Scoliosis S/P Lewis Valentín Placement      SPINAL FUSION         CURRENT MEDICATIONS:    Reviewed by me.    Current Facility-Administered Medications:     vancomycin (VANCOCIN) 750 mg in sodium chloride 0.9 % 250 mL intermittent infusion, 750 mg, Intravenous, Once, Ceasar Middleton MD    Current Outpatient Medications:     arginine (ARGINAID) PACK, Take 1 packet by mouth 2 times daily Per G-tube mixed with 4 ounces of water, Disp: , Rfl:     calcium carbonate (OS-NATHALIA) 500 MG TABS, 1 tablet by Per Feeding Tube route 2 times daily crushed, Disp: , Rfl:     desonide (DESOWEN) 0.05 % external cream, Apply topically 2 times daily .  Hold if no red, itchy, scaly areas on face., Disp: , Rfl:     levETIRAcetam (KEPPRA) 100 MG/ML oral solution, GIVE 10ML (1000MG) PER G-TUBE TWICE DAILY, Disp: 600 mL, Rfl: 11    levothyroxine (SYNTHROID/LEVOTHROID) 50 MCG tablet, Take 50 mcg by mouth daily Per G-tube, Disp: , Rfl:     multivitamin w/minerals (THERA-VIT-M) tablet, Take 1 tablet by mouth daily Per G-tube, Disp: , Rfl:     nystatin (MYCOSTATIN) 046680 UNIT/GM external cream, Apply topically 3 times daily as needed for other (TID To groin, and two times daily to G-tube site) (Patient taking differently: Apply topically 2 times daily as needed for other (TID To groin, and two times daily to G-tube site)), Disp: 60 g, Rfl: 0    protein modular, BENEPROTEIN, PACK,  7 g by Per Feeding Tube route 2 times daily, Disp: , Rfl:     topiramate (TOPAMAX) 200 MG tablet, TAKE 1 TABLET PER G-TUBE TWICE DAILY, Disp: 186 tablet, Rfl: 3    acetaminophen (TYLENOL) 325 MG tablet, Take 650 mg by mouth every 4 hours as needed for mild pain As needed for pain, discomfort, and fever.  Not to exceed 3000 mg in 24 hours., Disp: , Rfl:     bisacodyl (DULCOLAX) 10 MG suppository, Place 10 mg rectally every other day, Disp: , Rfl:     carboxymethylcellulose PF (REFRESH PLUS) 0.5 % SOLN ophthalmic solution, 1 drop At Bedtime, Disp: , Rfl:     clonazePAM (KLONOPIN) 0.5 MG tablet, Take 0.25 mg by mouth At Bedtime Per G-tube, Disp: , Rfl:     Dextromethorphan-guaiFENesin  MG/5ML syrup, Take 10 mLs by mouth every 6 hours as needed for cough, Disp: , Rfl:     diazepam (VALIUM) 5 MG/ML (HIGH CONC) solution, Take 5 mg by mouth every 8 hours as needed for anxiety, Disp: , Rfl:     diazePAM 1 MG/ML solution, GIVE 5ML (5MG) PER G-TUBE AS NEEDED FOR SEIZURE MAY REPEAT ONCE IN 15 MINUTES. MAX 2 DOSES IN 24 HOURS *SPLIT* *2 TOTAL FILLS*, Disp: 60 mL, Rfl: 0    diphenhydrAMINE (BENADRYL) 12.5 MG/5ML solution, Take 25 mg by mouth every 4 hours as needed for sleep , Disp: , Rfl:     folic acid (FOLVITE) 1 MG tablet, Take 1 mg by mouth daily Per G- tube, Disp: , Rfl:     hydrocortisone (CORTAID) 1 % external cream, Apply topically 2 times daily as needed To affected area of abdomen, Disp: , Rfl:     lamoTRIgine (LAMICTAL) 200 MG TBDP ODT tab, DISSOLVE 1 TABLET IN 5-10ML OF WATER AND TAKE PER G-TUBE TWICE DAILY ALONG WITH 75MG FOR TOTAL DOSE 275MG, Disp: 186 tablet, Rfl: 3    lamoTRIgine (LAMICTAL) 25 MG chewable tablet, 3 tablets (75 mg) by Per G Tube route 2 times daily, Disp: 540 tablet, Rfl: 3    nitroFURantoin (FURADANTIN) 25 MG/5ML suspension, Take 100 mg by mouth 4 times daily Per G-tube, Disp: , Rfl:     polyethylene glycol (MIRALAX/GLYCOLAX) packet, Take 1 packet by mouth 2 times daily as needed for  constipation Per G-tube, mix with 150 ml of water, Disp: , Rfl:     Psyllium (GENFIBER PO), Take 2 Tablespoonful by mouth daily Per G-tube with 4 ounces of water flush, Disp: , Rfl:     rectal diazepam KIT, 1 kit once as needed for medication administration 10 mg rectally once for seizure lasting over 5 minutes., Disp: , Rfl:     Sodium Phosphates (FLEET ENEMA RE), Place rectally every other day If no results from suppository, Disp: , Rfl:     VITAMIN D (CHOLECALCIFEROL) PO, Take 400 Units by mouth daily 2.5ml qd, Disp: , Rfl:     ALLERGIES:  Reviewed by me.  Allergies   Allergen Reactions    Ciprofloxacin Rash     Other reaction(s): *Unknown    Aspirin Buf(Cacarb-Mgcarb-Mgo)      Other reaction(s): Unknown    Lanolin      Other reaction(s): *Unknown    Nsaids      Other reaction(s): Unknown    Amoxicillin-Pot Clavulanate Rash     10/26/23 given ceftriaxone at [facility] [place]    Ibuprofen Rash     Other reaction(s): *Unknown    Salicylates Rash     Other reaction(s): Unknown       FAMILY HISTORY:  Reviewed by me.  Family History   Problem Relation Age of Onset    Seizure Disorder Sister        SOCIAL HISTORY:   Reviewed by me.  Social History     Socioeconomic History    Marital status: Single   Tobacco Use    Smoking status: Never    Smokeless tobacco: Never   Substance and Sexual Activity    Alcohol use: No    Drug use: No    Sexual activity: Not Currently     Birth control/protection: Post-menopausal         --------------- PHYSICAL EXAM ---------------  Nursing notes and vitals independently reviewed by me.  VITALS:  Vitals:    03/05/24 1432 03/05/24 1433 03/05/24 1434 03/05/24 1435   BP:       Pulse: 109 109 109 109   Resp: 30 29 30 29   Temp:       TempSrc:       SpO2: 92% 91% 92% 92%   Weight:       Height:           PHYSICAL EXAM:    General:  awake, eyes open, chronically-ill appearance, no distress  Eyes:  conjunctivae clear, no discharged  HENT:  atraumatic, nose with no rhinorrhea, oropharynx clear, dry  crusted oral mucosa  Neck:  no meningismus  Cardiovascular:  HR 110s during exam, regular rhythm, no murmurs, cap refill 3 seconds throughout  Chest:  no chest wall tenderness  Pulmonary:  no stridor, coarse breath sounds bilaterally with no wheezing, normal work of breathing  Abdomen:  soft, nondistended, nontender, unremarkable G/GJ tube site  :  no CVA tenderness  Musculoskeletal:  chronic contractures to all extremities with mild edema  Skin:  warm, dry, no rash  Neuro:  awake, looking around the room, baseline nonverbal (baseline mental status per group home staff at bedside)    --------------- RESULTS ---------------  EKG:    Reviewed and independently interpreted by me.  - sinus tachycardia at 113bpm, no ST or T wave changes, normal intervals  - unchanged from prior sinus tachycardia at 112bpm on 8/7/20  My read.    LAB:  Reviewed and independently interpreted by me.  Results for orders placed or performed during the hospital encounter of 03/05/24   Basic metabolic panel   Result Value Ref Range    Sodium 146 (H) 135 - 145 mmol/L    Potassium 3.2 (L) 3.4 - 5.3 mmol/L    Chloride 109 (H) 98 - 107 mmol/L    Carbon Dioxide (CO2) 27 22 - 29 mmol/L    Anion Gap 10 7 - 15 mmol/L    Urea Nitrogen 43.6 (H) 6.0 - 20.0 mg/dL    Creatinine 0.36 (L) 0.51 - 0.95 mg/dL    GFR Estimate >90 >60 mL/min/1.73m2    Calcium 10.3 (H) 8.6 - 10.0 mg/dL    Glucose 138 (H) 70 - 99 mg/dL   Hepatic function panel   Result Value Ref Range    Protein Total 7.9 6.4 - 8.3 g/dL    Albumin 4.0 3.5 - 5.2 g/dL    Bilirubin Total 0.3 <=1.2 mg/dL    Alkaline Phosphatase 250 (H) 40 - 150 U/L    AST 22 0 - 45 U/L    ALT 9 0 - 50 U/L    Bilirubin Direct <0.20 0.00 - 0.30 mg/dL   Result Value Ref Range    Lipase 30 13 - 60 U/L   Lactic acid whole blood   Result Value Ref Range    Lactic Acid 1.7 0.7 - 2.0 mmol/L   Result Value Ref Range    Procalcitonin 0.11 <0.50 ng/mL   Result Value Ref Range    Troponin T, High Sensitivity 29 (H) <=14 ng/L    Result Value Ref Range    Magnesium 2.4 (H) 1.7 - 2.3 mg/dL   Blood gas venous   Result Value Ref Range    pH Venous 7.42 7.32 - 7.43    pCO2 Venous 40 40 - 50 mm Hg    pO2 Venous 65 (H) 25 - 47 mm Hg    Bicarbonate Venous 26 21 - 28 mmol/L    Base Excess/Deficit Venous 1.2 -3.0 - 3.0 mmol/L    FIO2 21     Oxyhemoglobin Venous 93 (H) 70 - 75 %    O2 Sat, Venous 93.6 (H) 70.0 - 75.0 %   Result Value Ref Range    CRP Inflammation 4.20 <5.00 mg/L   TSH with free T4 reflex   Result Value Ref Range    TSH 1.82 0.30 - 4.20 uIU/mL   CBC with platelets and differential   Result Value Ref Range    WBC Count 21.3 (H) 4.0 - 11.0 10e3/uL    RBC Count 4.94 3.80 - 5.20 10e6/uL    Hemoglobin 14.7 11.7 - 15.7 g/dL    Hematocrit 47.7 (H) 35.0 - 47.0 %    MCV 97 78 - 100 fL    MCH 29.8 26.5 - 33.0 pg    MCHC 30.8 (L) 31.5 - 36.5 g/dL    RDW 14.6 10.0 - 15.0 %    Platelet Count 296 150 - 450 10e3/uL    % Neutrophils 75 %    % Lymphocytes 14 %    % Monocytes 9 %    % Eosinophils 0 %    % Basophils 1 %    % Immature Granulocytes 1 %    NRBCs per 100 WBC 0 <1 /100    Absolute Neutrophils 16.3 (H) 1.6 - 8.3 10e3/uL    Absolute Lymphocytes 2.9 0.8 - 5.3 10e3/uL    Absolute Monocytes 1.8 (H) 0.0 - 1.3 10e3/uL    Absolute Eosinophils 0.0 0.0 - 0.7 10e3/uL    Absolute Basophils 0.1 0.0 - 0.2 10e3/uL    Absolute Immature Granulocytes 0.1 <=0.4 10e3/uL    Absolute NRBCs 0.0 10e3/uL       RADIOLOGY:  CT chest/abdomen/pelvis:  pending at time of patient care handoff    PROCEDURES:   Procedures   --------------------------------------------------------------------------------   Cardiac telemetry monitoring ordered by me secondary to the patient's history of sepsis and to monitor the patient for dysrhythmia. Reviewed & independently interpreted by me. Revealed sinus rhythm.  --------------------------------------------------------------------------------         Critical Care     Performed by:   Ceasar Middleton MD   Authorized by:   Ceasar ZAMBRANO  MD Emi  Total critical care time: 35 minutes (Critical care time was exclusive of separately billable procedures and treating other patients.)    Critical care was necessary to treat or prevent imminent or life-threatening deterioration of the following conditions: Sepsis requiring 2+ IV antibiotics and admission    Critical care was time spent personally by me on the following activities:  - obtaining history from patient or surrogate  - examination of patient  - development of treatment plan with patient or surrogate  - ordering and performing treatments and interventions  - ordering and review of laboratory studies  - ordering of radiographic studies  - re-evaluation of patient's condition  - monitoring for potential decompensation  ---------------------------------------------------------------------------------------------------------------------  ---------------------------------------------------------------------------------------------------------------------            --------------- ADDITIONAL MDM ---------------  History:  - I considered systemic symptoms of the presenting illness.  - Supplemental history from:       -- group home staff  - External Record(s) reviewed:       -- Inpatient/outpatient record, prior labs, prior imaging       -- see above ED course & MDM for further details    Workup:  - Chart documentation above includes differential considered and any EKGs or imaging independently interpreted by provider.  - In additional to work up documented, I considered the following work up:       -- see above ED course & MDM for further details    External Consultation:  - Discussion of management with another provider:       -- see above charting for additional    Complicating Factors:  - Care impacted by chronic illness:       -- see above MDM, past medical history, & problem list  - Care affected by social determinants of health:       -- see above social history       -- Access to Affordable  Healthcare    Disposition Considerations:  - Admit           Ceasar Middleton MD  03/05/24  Emergency Medicine  [facility] EMERGENCY DEPARTMENT  [facility] [facility] [id number] [phone]  Dept: [phone]     Ceasar Middleton MD  03/05/24 7237

## 2024-03-05 NOTE — ED TRIAGE NOTES
The patient presents with  staff, non verbal at bedside, pt presents non ambulatory in a wheelchair. Pt had wheezing noted by staff. Was seen at the urgency room and MD felt she was mottling and sent to ER for further evaluation.      Triage Assessment (Adult)       Row Name 03/05/24 1235          Triage Assessment    Airway WDL X     Additional Documentation Breath Sounds (Group)        Respiratory WDL    Respiratory WDL X;cough;expansion/retractions        Skin Circulation/Temperature WDL    Skin Circulation/Temperature WDL X;circulation        Cardiac WDL    Cardiac WDL X     Cardiac Rhythm ST        Peripheral/Neurovascular WDL    Peripheral Neurovascular WDL X;capillary refill     Capillary Refill, General less than/equal to 3 secs     Capillary Refill, LUE less than/equal to 3 secs     Capillary Refill, RUE less than/equal to 3 secs     Capillary Refill, LLE less than/equal to 3 secs        Cognitive/Neuro/Behavioral WDL    Cognitive/Neuro/Behavioral WDL X;mood/behavior;motor response     Arousal Level arouses to voice     Orientation disoriented x 4     Speech unable to speak     Mood/Behavior flat affect        Markle Coma Scale    Best Eye Response 4-->(E4) spontaneous     Best Motor Response 6-->(M6) obeys commands     Best Verbal Response 2-->(V2) incomprehensible speech     Markle Coma Scale Score 12

## 2024-03-05 NOTE — ED NOTES
EMERGENCY DEPARTMENT SIGN OUT NOTE        ED COURSE AND MEDICAL DECISION MAKING  Patient was signed out to me by Dr Grzegorz Middleton at 3:05 PM      In brief, Elysia Arellano is a 58 year old female who initially presented with increased work of breathing.     At time of sign out, disposition was pending CT scan and admission.    ED Course as of 03/05/24 1624   Tue Mar 05, 2024   1421 58yoF with history of cerebral palsy (takes Keppra), nonverbal, nonambulatory, G/GJ tube dependent presenting from group home with staff for evaluation of increased work of breathing. Reports new mild dry cough this morning with mild wheezing (no history of lung disease); found to have high heart rate so went to [facility]----no testing done there and sent to the ED. Staff states she is acting baseline. No vomiting or diarrhea. No illnesses going around group home currently.    Rectal temp 101.1F on presentation with HR 110s, BP 150s/80s, RR 20, satting 91% on room air. No distress on exam   1506 Pt signed out to me by Dr Middleton pending CT, admission with sepsis and likely PNA   1558 CT with likely both stercoral colitis (begun on miralax) and aspiration PNA, hospitalist paged for admission   1622 Pt endorsed to hospitalist to med surg     Critical Care time was 45 minutes for this patient excluding procedures.        FINAL IMPRESSION    1. Sepsis, due to unspecified organism, unspecified whether acute organ dysfunction present (H)    2. History of cerebral palsy    3. Pneumonia due to infectious organism, unspecified laterality, unspecified part of lung    4. Stercoral colitis        ED MEDS  Medications   vancomycin (VANCOCIN) 750 mg in sodium chloride 0.9 % 250 mL intermittent infusion (750 mg Intravenous $New Bag 3/5/24 1611)   polyethylene glycol (MIRALAX) Packet 17 g (has no administration in time range)   ceFEPIme (MAXIPIME) 2 g vial to attach to  mL bag for ADULTS or 50 mL bag for PEDS (has no administration in time range)    dextrose 5% and 0.45% NaCl + KCl 20 mEq/L infusion (has no administration in time range)   polyethylene glycol (MIRALAX) Packet 17 g (has no administration in time range)   ceFEPIme (MAXIPIME) 2 g vial to attach to  mL bag for ADULTS or 50 mL bag for PEDS (0 g Intravenous Stopped 3/5/24 1612)   sodium chloride 0.9% BOLUS 633 mL (633 mLs Intravenous $New Bag 3/5/24 1437)   acetaminophen (TYLENOL) solution 650 mg (650 mg Per G Tube $Given 3/5/24 1444)   iopamidol (ISOVUE-370) solution 50 mL (50 mLs Intravenous $Given 3/5/24 1522)       LAB  Labs Ordered and Resulted from Time of ED Arrival to Time of ED Departure   BASIC METABOLIC PANEL - Abnormal       Result Value    Sodium 146 (*)     Potassium 3.2 (*)     Chloride 109 (*)     Carbon Dioxide (CO2) 27      Anion Gap 10      Urea Nitrogen 43.6 (*)     Creatinine 0.36 (*)     GFR Estimate >90      Calcium 10.3 (*)     Glucose 138 (*)    HEPATIC FUNCTION PANEL - Abnormal    Protein Total 7.9      Albumin 4.0      Bilirubin Total 0.3      Alkaline Phosphatase 250 (*)     AST 22      ALT 9      Bilirubin Direct <0.20     TROPONIN T, HIGH SENSITIVITY - Abnormal    Troponin T, High Sensitivity 29 (*)    MAGNESIUM - Abnormal    Magnesium 2.4 (*)    BLOOD GAS VENOUS - Abnormal    pH Venous 7.42      pCO2 Venous 40      pO2 Venous 65 (*)     Bicarbonate Venous 26      Base Excess/Deficit Venous 1.2      FIO2 21      Oxyhemoglobin Venous 93 (*)     O2 Sat, Venous 93.6 (*)    CBC WITH PLATELETS AND DIFFERENTIAL - Abnormal    WBC Count 21.3 (*)     RBC Count 4.94      Hemoglobin 14.7      Hematocrit 47.7 (*)     MCV 97      MCH 29.8      MCHC 30.8 (*)     RDW 14.6      Platelet Count 296      % Neutrophils 75      % Lymphocytes 14      % Monocytes 9      % Eosinophils 0      % Basophils 1      % Immature Granulocytes 1      NRBCs per 100 WBC 0      Absolute Neutrophils 16.3 (*)     Absolute Lymphocytes 2.9      Absolute Monocytes 1.8 (*)     Absolute Eosinophils 0.0       Absolute Basophils 0.1      Absolute Immature Granulocytes 0.1      Absolute NRBCs 0.0     LIPASE - Normal    Lipase 30     LACTIC ACID WHOLE BLOOD - Normal    Lactic Acid 1.7     PROCALCITONIN - Normal    Procalcitonin 0.11     CRP INFLAMMATION - Normal    CRP Inflammation 4.20     TSH WITH FREE T4 REFLEX - Normal    TSH 1.82     INFLUENZA A/B, RSV, & SARS-COV2 PCR   ROUTINE UA WITH MICROSCOPIC REFLEX TO CULTURE   TROPONIN T, HIGH SENSITIVITY   BLOOD CULTURE   BLOOD CULTURE   MRSA MSSA PCR, NASAL SWAB         RADIOLOGY    CT Chest/Abdomen/Pelvis w Contrast   Final Result   IMPRESSION:   1.  Extensive endobronchial debris throughout the left main bronchus and left lung airways concerning for mucous plugging and/or aspiration. There is associated consolidation in the lingula and left lower lobe.      2.  Large amount of stool in the distal sigmoid colon and rectum suggesting stercoral colitis.          DISCHARGE MEDS  New Prescriptions    No medications on file       Lauren Tellez M.D.  [facility] EMERGENCY DEPARTMENT  [address] [place] [id number] [phone]       Lauren Tellez MD  03/05/24 1170

## 2024-03-05 NOTE — PHARMACY-VANCOMYCIN DOSING SERVICE
Pharmacy Vancomycin Initial Note  Date of Service 2024  Patient's  1965  58 year old, female    Indication: Sepsis    Current estimated CrCl = Estimated Creatinine Clearance: 126.8 mL/min (A) (based on SCr of 0.33 mg/dL (L)).    Creatinine for last 3 days  No results found for requested labs within last 3 days.    Recent Vancomycin Level(s) for last 3 days  No results found for requested labs within last 3 days.      Vancomycin IV Administrations (past 72 hours)        No vancomycin orders with administrations in past 72 hours.                    Nephrotoxins and other renal medications (From now, onward)      Start     Dose/Rate Route Frequency Ordered Stop    24 1400  vancomycin (VANCOCIN) 750 mg in sodium chloride 0.9 % 250 mL intermittent infusion         750 mg  over 60 Minutes Intravenous ONCE 24 1333              Contrast Orders - past 72 hours (72h ago, onward)      None                  Plan:  Start vancomycin  750 mg IV once. (~18 mg/kg)  This is a one time dose. [facility]    RADHAMES WALSH, [facility]

## 2024-03-05 NOTE — ED NOTES
Multiple attempts have been made to straight cath pt after fluids and abx. Unsuccessful every time. Brief was dry

## 2024-03-06 LAB
ANION GAP SERPL CALCULATED.3IONS-SCNC: 12 MMOL/L (ref 7–15)
BUN SERPL-MCNC: 31.2 MG/DL (ref 6–20)
CALCIUM SERPL-MCNC: 8.6 MG/DL (ref 8.6–10)
CHLORIDE SERPL-SCNC: 113 MMOL/L (ref 98–107)
CREAT SERPL-MCNC: 0.37 MG/DL (ref 0.51–0.95)
DEPRECATED HCO3 PLAS-SCNC: 21 MMOL/L (ref 22–29)
EGFRCR SERPLBLD CKD-EPI 2021: >90 ML/MIN/1.73M2
ERYTHROCYTE [DISTWIDTH] IN BLOOD BY AUTOMATED COUNT: 14.6 % (ref 10–15)
GLUCOSE SERPL-MCNC: 115 MG/DL (ref 70–99)
HCT VFR BLD AUTO: 47 % (ref 35–47)
HGB BLD-MCNC: 13.9 G/DL (ref 11.7–15.7)
MCH RBC QN AUTO: 30.2 PG (ref 26.5–33)
MCHC RBC AUTO-ENTMCNC: 29.6 G/DL (ref 31.5–36.5)
MCV RBC AUTO: 102 FL (ref 78–100)
PLATELET # BLD AUTO: 207 10E3/UL (ref 150–450)
POTASSIUM SERPL-SCNC: 4.3 MMOL/L (ref 3.4–5.3)
RBC # BLD AUTO: 4.61 10E6/UL (ref 3.8–5.2)
SODIUM SERPL-SCNC: 146 MMOL/L (ref 135–145)
WBC # BLD AUTO: 16.1 10E3/UL (ref 4–11)

## 2024-03-06 PROCEDURE — 80048 BASIC METABOLIC PNL TOTAL CA: CPT | Performed by: INTERNAL MEDICINE

## 2024-03-06 PROCEDURE — 250N000013 HC RX MED GY IP 250 OP 250 PS 637: Performed by: INTERNAL MEDICINE

## 2024-03-06 PROCEDURE — 250N000011 HC RX IP 250 OP 636: Mod: JZ | Performed by: INTERNAL MEDICINE

## 2024-03-06 PROCEDURE — 36415 COLL VENOUS BLD VENIPUNCTURE: CPT | Performed by: INTERNAL MEDICINE

## 2024-03-06 PROCEDURE — 99232 SBSQ HOSP IP/OBS MODERATE 35: CPT | Performed by: INTERNAL MEDICINE

## 2024-03-06 PROCEDURE — 85027 COMPLETE CBC AUTOMATED: CPT | Performed by: INTERNAL MEDICINE

## 2024-03-06 PROCEDURE — 99222 1ST HOSP IP/OBS MODERATE 55: CPT | Performed by: INTERNAL MEDICINE

## 2024-03-06 PROCEDURE — 120N000001 HC R&B MED SURG/OB

## 2024-03-06 PROCEDURE — 87641 MR-STAPH DNA AMP PROBE: CPT | Performed by: INTERNAL MEDICINE

## 2024-03-06 PROCEDURE — 258N000003 HC RX IP 258 OP 636: Performed by: INTERNAL MEDICINE

## 2024-03-06 RX ORDER — DEXTROSE MONOHYDRATE 50 MG/ML
INJECTION, SOLUTION INTRAVENOUS CONTINUOUS
Status: ACTIVE | OUTPATIENT
Start: 2024-03-06 | End: 2024-03-07

## 2024-03-06 RX ORDER — ALBUTEROL SULFATE 0.83 MG/ML
2.5 SOLUTION RESPIRATORY (INHALATION) EVERY 4 HOURS PRN
Status: DISCONTINUED | OUTPATIENT
Start: 2024-03-06 | End: 2024-03-11 | Stop reason: HOSPADM

## 2024-03-06 RX ADMIN — BISACODYL 10 MG: 10 SUPPOSITORY RECTAL at 20:15

## 2024-03-06 RX ADMIN — LEVOTHYROXINE SODIUM 50 MCG: 0.03 TABLET ORAL at 19:01

## 2024-03-06 RX ADMIN — METRONIDAZOLE 500 MG: 500 INJECTION, SOLUTION INTRAVENOUS at 15:21

## 2024-03-06 RX ADMIN — DESONIDE: 0.5 CREAM TOPICAL at 11:06

## 2024-03-06 RX ADMIN — DESONIDE: 0.5 CREAM TOPICAL at 19:02

## 2024-03-06 RX ADMIN — CEFTRIAXONE SODIUM 1 G: 1 INJECTION, POWDER, FOR SOLUTION INTRAMUSCULAR; INTRAVENOUS at 21:45

## 2024-03-06 RX ADMIN — LAMOTRIGINE 200 MG: 100 TABLET ORAL at 19:01

## 2024-03-06 RX ADMIN — METRONIDAZOLE 500 MG: 500 INJECTION, SOLUTION INTRAVENOUS at 06:37

## 2024-03-06 RX ADMIN — NYSTATIN: 100000 CREAM TOPICAL at 19:02

## 2024-03-06 RX ADMIN — ENOXAPARIN SODIUM 30 MG: 30 INJECTION SUBCUTANEOUS at 17:15

## 2024-03-06 RX ADMIN — DEXTROSE MONOHYDRATE: 50 INJECTION, SOLUTION INTRAVENOUS at 17:43

## 2024-03-06 RX ADMIN — LAMOTRIGINE 200 MG: 100 TABLET ORAL at 10:37

## 2024-03-06 RX ADMIN — METRONIDAZOLE 500 MG: 500 INJECTION, SOLUTION INTRAVENOUS at 22:21

## 2024-03-06 RX ADMIN — Medication 1 DROP: at 21:47

## 2024-03-06 RX ADMIN — LAMOTRIGINE 75 MG: 25 TABLET ORAL at 19:01

## 2024-03-06 RX ADMIN — Medication 40 MG: at 10:38

## 2024-03-06 RX ADMIN — TOPIRAMATE 200 MG: 100 TABLET, FILM COATED ORAL at 10:37

## 2024-03-06 RX ADMIN — LEVETIRACETAM 1000 MG: 100 SOLUTION ORAL at 10:38

## 2024-03-06 RX ADMIN — POLYETHYLENE GLYCOL 3350 17 G: 17 POWDER, FOR SOLUTION ORAL at 19:00

## 2024-03-06 RX ADMIN — LAMOTRIGINE 75 MG: 25 TABLET ORAL at 10:38

## 2024-03-06 RX ADMIN — FOLIC ACID 1 MG: 1 TABLET ORAL at 10:37

## 2024-03-06 RX ADMIN — TOPIRAMATE 200 MG: 100 TABLET, FILM COATED ORAL at 19:01

## 2024-03-06 RX ADMIN — NYSTATIN: 100000 CREAM TOPICAL at 10:59

## 2024-03-06 RX ADMIN — POLYETHYLENE GLYCOL 3350 17 G: 17 POWDER, FOR SOLUTION ORAL at 10:37

## 2024-03-06 RX ADMIN — LEVETIRACETAM 1000 MG: 100 SOLUTION ORAL at 19:00

## 2024-03-06 ASSESSMENT — ACTIVITIES OF DAILY LIVING (ADL)
DOING_ERRANDS_INDEPENDENTLY_DIFFICULTY: YES
FALL_HISTORY_WITHIN_LAST_SIX_MONTHS: NO
ADLS_ACUITY_SCORE: 69
DIFFICULTY_COMMUNICATING: YES
ADLS_ACUITY_SCORE: 69
TOILETING_ASSISTANCE: TOILETING DIFFICULTY, DEPENDENT
ADLS_ACUITY_SCORE: 51
ADLS_ACUITY_SCORE: 51
ADLS_ACUITY_SCORE: 55
ADLS_ACUITY_SCORE: 59
ADLS_ACUITY_SCORE: 51
HEARING_DIFFICULTY_OR_DEAF: NO
TOILETING: 2-->COMPLETELY DEPENDENT (NOT DEVELOPMENTALLY APPROPRIATE)
EATING/SWALLOWING_MANAGEMENT: G TUBE
ADLS_ACUITY_SCORE: 69
ADLS_ACUITY_SCORE: 51
COMMUNICATION: UNABLE TO SPEAK
TOILETING: 2-->COMPLETELY DEPENDENT
DEPENDENT_IADLS:: CLEANING;COOKING;LAUNDRY;SHOPPING;MEAL PREPARATION;MEDICATION MANAGEMENT;MONEY MANAGEMENT;TRANSPORTATION;INCONTINENCE
CONCENTRATING,_REMEMBERING_OR_MAKING_DECISIONS_DIFFICULTY: YES
ADLS_ACUITY_SCORE: 59
DRESSING/BATHING: BATHING DIFFICULTY, DEPENDENT;DRESSING DIFFICULTY, DEPENDENT
WALKING_OR_CLIMBING_STAIRS: AMBULATION DIFFICULTY, DEPENDENT;STAIR CLIMBING DIFFICULTY, DEPENDENT;TRANSFERRING DIFFICULTY, DEPENDENT
DRESSING/BATHING_DIFFICULTY: YES
ADLS_ACUITY_SCORE: 55
DIFFICULTY_EATING/SWALLOWING: YES
TOILETING_ISSUES: YES
CHANGE_IN_FUNCTIONAL_STATUS_SINCE_ONSET_OF_CURRENT_ILLNESS/INJURY: NO
ADLS_ACUITY_SCORE: 69
EQUIPMENT_CURRENTLY_USED_AT_HOME: LIFT DEVICE
WEAR_GLASSES_OR_BLIND: NO
EATING/SWALLOWING: OTHER (SEE COMMENTS)
ADLS_ACUITY_SCORE: 51
WALKING_OR_CLIMBING_STAIRS_DIFFICULTY: YES
ADLS_ACUITY_SCORE: 69
ADLS_ACUITY_SCORE: 51
ADLS_ACUITY_SCORE: 51
ADLS_ACUITY_SCORE: 55
ADLS_ACUITY_SCORE: 51
ADLS_ACUITY_SCORE: 55
ADLS_ACUITY_SCORE: 55
ADLS_ACUITY_SCORE: 51

## 2024-03-06 NOTE — PROGRESS NOTES
[facility]    Medicine Progress Note - Hospitalist Service    Date of Admission:  3/5/2024    Assessment & Plan   Elysia Arellano is a 58 year old female with history of cerebral palsy, developmental delay, nonverbal, neurogenic bowel and bladder, seizure disorder, hypothyroidism and chronic dysphagia admitted on 3/5/2024 with acute hypoxic respiratory failure and sepsis likely secondary to aspiration pneumonia.        Sepsis  Acute hypoxemic respiratory failure  -- Suspect secondary to aspiration pneumonia. CT on admission shows extensive endobronchial debris left main bronchus and left lung concerning for mucous plugging and/or aspiration with associated consolidation   -- Received IV cefepime given PCN allergy however there is risk of neurotoxicity and in setting of seizure disorder, changed to IV ceftriaxone and flagyl to cover aspiration PNA  -- IV hydration  -- Follow-up blood culture results  -- UA- pyuria noted. Above antibitoics should be sufficient  -- Continue oxygen as needed   -- Appreciate pulm consult. Vest therapy. If oxygenation doesnt improve, can re-consider bronchoscopy per pulm     Vancomycin allergy: initially started on IV vanco as part of sepsis protocol however developed rash  -- Stop vanco  -- Allergy added  -- PRN benadryl given  -- Monitor  -- MRSA swab- pending     Imaging evidence of possible stercoral colitis  Known history of neurogenic bowel related to cerebral palsy  -- MiraLAX twice daily, senna as needed, escalate bowel regimen as needed     Known history of oropharyngeal dysphagia  -- Has G-tube in place and is on chronic tube feeds  -- Strict n.p.o. Patient has been strict NPO for many years  -- Nutrition consult to resume tube feeds     Mild hypernatremia:  -- Continue water flushes per home routine  -- Placed on hypotonic IV fluids overnight. Sodium is still at 146. D5W @ 50ml/hr x 12 hours. Check labs in am     Hypokalemia:  -- Replacement  "protocol  -- Placed on potassium containing IV fluids     Mild hypercalcemia: likely related to dehydration and home scheduled Oscal  -- Hold home Oscal  -- Trend for improvement with IVF     Mild troponin elevation consistent with demand ischemia  -- Recheck troponin only 30 compared to 29 on admission, EKG with sinus tachycardia  -- Stop trending troponin  -- Treat sepsis as above     Seizure disorder: Continue home Lamictal and Keppra     Hypothyroidism: Continue home replacement, TSH acceptable     GERD: Continue home PPI             Diet: NPO for Medical/Clinical Reasons Except for: NPO but receiving Tube Feeding  Adult Formula Drip Feeding: Continuous Jevity 1.5; Jejunostomy; Goal Rate: 100; mL/hr; From: 8:00 PM; To: 6:00 AM; Provide Synthroid dose 1 hr before starting TF    DVT Prophylaxis: Enoxaparin (Lovenox) SQ  Araujo Catheter: Not present  Lines: None     Cardiac Monitoring: None  Code Status: No CPR- Do NOT Intubate      Clinically Significant Risk Factors        # Hypokalemia: Lowest K = 3.2 mmol/L in last 2 days, will replace as needed  # Hypernatremia: Highest Na = 146 mmol/L in last 2 days, will monitor as appropriate   # Hypercalcemia: Highest Ca = 10.3 mg/dL in last 2 days, will monitor as appropriate               # Cachexia: Estimated body mass index is 17.48 kg/m  as calculated from the following:    Height as of this encounter: 1.626 m (5' 4\").    Weight as of this encounter: 46.2 kg (101 lb 13.6 oz)., PRESENT ON ADMISSION            Disposition Plan      Expected Discharge Date: 03/09/2024    Discharge Delays: IV Medication - consider oral or Home Infusion  Destination: group home              MALIA Kaur  Hospitalist Service  [facility]  Securely message with [email or web address] (more info)  Text page via [facility] Paging/Directory   ______________________________________________________________________    Interval History   Patient is new to me today. Patient is " non-verbal. ROS is unobtainable. No new issues overnight per nursing staff. The skin rash from vanco allergy has resolved for the most part.    Physical Exam   Vital Signs: Temp: 98.4  F (36.9  C) Temp src: Oral BP: 101/62 Pulse: 90   Resp: 20 SpO2: 97 % O2 Device: None (Room air) Oxygen Delivery: 2 LPM  Weight: 101 lbs 13.64 oz      General: Not in obvious distress.  HEENT: NC, AT   Chest: Coarse breath sounds, mostly on the left side  Heart: S1S2 normal, regular. No M/R/G  Abdomen: Soft. NT, ND. Bowel sounds- active. GJ in place  Extremities: No legs swelling  Neuro: Non-verbal. Opens eyes. Doesn't follow commands. Extremites are contracted. Extremity muscle wasting due to non-use noted.       Medical Decision Making             Data     I have personally reviewed the following data over the past 24 hrs:    16.1 (H)  \   13.9   / 207     146 (H) 113 (H) 31.2 (H) /  115 (H)   4.3 21 (L) 0.37 (L) \       Imaging results reviewed over the past 24 hrs:   No results found for this or any previous visit (from the past 24 hour(s)).

## 2024-03-06 NOTE — CONSULTS
"  Pulmonary/Critical Care Consult Team Note    Elysia Arellano,  1965, MRN [id number]  Admitting Dx: History of cerebral palsy [Z86.69]  Pneumonia due to infectious organism, unspecified laterality, unspecified part of lung [J18.9]  Sepsis, due to unspecified organism, unspecified whether acute organ dysfunction present (H) [A41.9]  Stercoral colitis [K52.89]  Date / Time of Admission:  3/5/2024 12:41 PM    Unable to get Hx due to pt being non-verbal    Intake/Output last 3 shifts:  I/O last 3 completed shifts:  In: 600 [Other:600]  Out: 150 [Urine:150]    Assessment/Plan: Elysia Arellano is a 58 year old female with PMHx of cerebral palsy, developmental delay, seizure disorder, GERD, chronic GJ tube with acute resp failure and dyspnea with wheezing.    Aspriation PNA  - debris etc in airways on CT scan  - I assume this is a chronic problem and is NPO at baseline, would consider bronchoscopy, however would be high risk with general anesthesia for little benefit if this is a chronic condition  - would continue antibiotics  - unable to do flutter valve  - can try vest therapy  - albuterol nebs for wheezing  - if oxygenation does not improve or ongoing clinical issues can re-consider bronchoscopy (would repeat imaging first)    Medical Care Time excluding procedures and family discussions greater than: 1 Hour    Risk Factors Present on Admission:  Clinically Significant Risk Factors Present on Admission        # Hypokalemia: Lowest K = 3.2 mmol/L in last 2 days, will replace as needed  # Hypernatremia: Highest Na = 146 mmol/L in last 2 days, will monitor as appropriate   # Hypercalcemia: Highest Ca = 10.3 mg/dL in last 2 days, will monitor as appropriate             # Cachexia: Estimated body mass index is 15.96 kg/m  as calculated from the following:    Height as of this encounter: 1.626 m (5' 4\").    Weight as of this encounter: 42.2 kg (93 lb).            Code Status: No CPR- Do NOT Intubate         Christy " "DO Nery  Pulmonary and Critical Care Attending  pgr [phone]    Allergies   Allergen Reactions    Vancomycin Hives, Itching and Rash    Ciprofloxacin Rash     Other reaction(s): *Unknown    Aspirin Buf(Cacarb-Mgcarb-Mgo)      Other reaction(s): Unknown    Lanolin      Other reaction(s): *Unknown    Nsaids      Other reaction(s): Unknown    Amoxicillin-Pot Clavulanate Rash     10/26/23 given ceftriaxone at [facility] ED    Ibuprofen Rash     Other reaction(s): *Unknown    Salicylates Rash     Other reaction(s): Unknown       Meds: See MAR    Physical Exam:  /62 (BP Location: Right leg)   Pulse 90   Temp 97  F (36.1  C) (Axillary)   Resp 20   Ht 1.626 m (5' 4\")   Wt 42.2 kg (93 lb)   SpO2 97%   BMI 15.96 kg/m    Intake/Output this shift:  No intake/output data recorded.  GEN: laying in bed  HEENT: MMM, poor dentition  CVS: regular rhythm, no murmurs  RESP: rhonchi at bases, poor air mvmt on left, no wheezing  ABD: abd binder in place, G tube  EXT: Warm, well perfused, no edema  NEURO: contracted, moving head, moving upper extremities, not following commands  PSYCH: pleasant    Pertinent Labs: Latest lab results in EHR personally reviewed.   CMP  Recent Labs   Lab 03/06/24  0546 03/05/24  1323   * 146*   POTASSIUM 4.3 3.2*   CHLORIDE 113* 109*   CO2 21* 27   ANIONGAP 12 10   * 138*   BUN 31.2* 43.6*   CR 0.37* 0.36*   GFRESTIMATED >90 >90   NATHALIA 8.6 10.3*   MAG  --  2.4*   PROTTOTAL  --  7.9   ALBUMIN  --  4.0   BILITOTAL  --  0.3   ALKPHOS  --  250*   AST  --  22   ALT  --  9     CBC  Recent Labs   Lab 03/06/24  0546 03/05/24  1323   WBC 16.1* 21.3*   RBC 4.61 4.94   HGB 13.9 14.7   HCT 47.0 47.7*   * 97   MCH 30.2 29.8   MCHC 29.6* 30.8*   RDW 14.6 14.6    296     INRNo lab results found in last 7 days.  Arterial Blood Gas  Recent Labs   Lab 03/05/24  1323   O2PER 21       Cultures: personally reviewed.   7-Day Micro Results    Collected Updated Procedure Result Status  "   03/05/2024 2029 03/06/2024 1419 Urine Culture [[id number]]   Urine, Midstream    Preliminary result Component Value   Culture Culture in progress P             03/05/2024 1906 03/05/2024 1947 Symptomatic Influenza A/B, RSV, & SARS-CoV2 PCR (COVID-19) Nasopharyngeal [[id number]]    Swab from Nasopharyngeal    Final result Component Value   Influenza A PCR Negative   Influenza B PCR Negative   RSV PCR Negative   SARS CoV2 PCR Negative   NEGATIVE: SARS-CoV-2 (COVID-19) RNA not detected, presumed negative.          03/05/2024 1414 03/06/2024 0547 Blood Culture Peripheral Blood [[id number]]   Peripheral Blood    Preliminary result Component Value   Culture No growth after 12 hours P             03/05/2024 1323 03/06/2024 0546 Blood Culture Peripheral Blood [[id number]]   Peripheral Blood    Preliminary result Component Value   Culture No growth after 12 hours P                 Imaging: personally reviewed.   EXAM: CT CHEST/ABDOMEN/PELVIS W CONTRAST  LOCATION: [facility]  DATE: 3/5/2024     INDICATION: cough, sepsis, nonverbal baseline  COMPARISON: 3/1/2017  TECHNIQUE: CT scan of the chest, abdomen, and pelvis was performed following injection of IV contrast. Multiplanar reformats were obtained. Dose reduction techniques were used.   CONTRAST: isovue 370  50ml     FINDINGS:   LUNGS AND PLEURA: Extensive endobronchial debris throughout the left main bronchus and its branches with associated consolidation in the lingula and left lower lobe. The right lung is clear, although evaluation is somewhat limited by respiratory motion.     MEDIASTINUM/AXILLAE: No lymphadenopathy. No thoracic aortic aneurysms. Aberrant retroesophageal course of the right subclavian artery.     CORONARY ARTERY CALCIFICATION: None.      HEPATOBILIARY: Normal.     PANCREAS: Normal.     SPLEEN: Normal.     ADRENAL GLANDS: Normal.     KIDNEYS/BLADDER: Normal.     BOWEL: Percutaneous gastrojejunostomy tube with balloon in  the stomach and tip in the proximal jejunum. Large amount of retained stool throughout the colon, including a large stool ball in the distal sigmoid colon and rectum. Mild rectal wall thickening,   but no ulceration or extraluminal gas.     LYMPH NODES: Normal.     VASCULATURE: Unremarkable.     PELVIC ORGANS: Normal.     MUSCULOSKELETAL: Fusion rods throughout the spine with dextroconvex curvature.                                                                      IMPRESSION:  1.  Extensive endobronchial debris throughout the left main bronchus and left lung airways concerning for mucous plugging and/or aspiration. There is associated consolidation in the lingula and left lower lobe.     2.  Large amount of stool in the distal sigmoid colon and rectum suggesting stercoral colitis    Patient Active Problem List   Diagnosis    Scoliosis    Intellectual disability    Cerebral palsy (H)    Idiopathic generalized epilepsy, intractable (H)    Gastroesophageal reflux disease without esophagitis    History of Nissen fundoplication    Osteoporosis    Absence of toe of right foot (H24)    Gastrostomy present (H)    Urinary incontinence    Retention of urine    Neurogenic bladder    CP (cerebral palsy), spastic, quadriplegic (H)    Constipation    At high risk for falls    History of cerebral palsy    Pneumonia due to infectious organism, unspecified laterality, unspecified part of lung    Sepsis, due to unspecified organism, unspecified whether acute organ dysfunction present (H)    Stercoral colitis         Surgical History  She  has a past surgical history that includes IR Gastro Jejunostomy Tube Placement; Scoliosis S/P Lewis Valentín Placement; IR Miscellaneous Procedure (6/19/2000); IR Abscess Tube Change (6/27/2000); IR Abscess Tube Change (9/5/2000); IR Abscess Tube Change (3/15/2001); IR Abscess Tube Change (7/6/2001); IR Abscess Tube Change (9/4/2001); IR Miscellaneous Procedure (12/22/2001); IR Miscellaneous  Procedure (1/7/2002); IR Miscellaneous Procedure (1/19/2002); IR Miscellaneous Procedure (1/19/2002); IR Miscellaneous Procedure (2/20/2002); IR Miscellaneous Procedure (2/20/2002); IR Miscellaneous Procedure (3/19/2002); IR Miscellaneous Procedure (4/16/2002); IR Miscellaneous Procedure (7/11/2002); IR Miscellaneous Procedure (7/11/2002); IR Gastrostomy Tube Change (7/18/2002); IR Miscellaneous Procedure (7/30/2002); IR Miscellaneous Procedure (8/16/2002); IR Miscellaneous Procedure (8/31/2002); IR Miscellaneous Procedure (9/18/2002); IR Miscellaneous Procedure (3/1/2003); IR Miscellaneous Procedure (8/5/2003); IR Miscellaneous Procedure (1/29/2004); IR Miscellaneous Procedure (3/18/2004); IR Miscellaneous Procedure (7/21/2004); IR Miscellaneous Procedure (11/15/2004); IR Miscellaneous Procedure (2/18/2005); IR Miscellaneous Procedure (4/8/2005); IR Miscellaneous Procedure (6/30/2005); IR Miscellaneous Procedure (9/30/2005); IR Miscellaneous Procedure (12/20/2005); IR Miscellaneous Procedure (3/28/2006); IR Miscellaneous Procedure (6/30/2006); IR Miscellaneous Procedure (10/30/2006); IR Miscellaneous Procedure (2/28/2007); IR Miscellaneous Procedure (6/19/2007); IR Miscellaneous Procedure (9/25/2007); IR Miscellaneous Procedure (12/10/2007); IR Miscellaneous Procedure (1/5/2008); IR Miscellaneous Procedure (6/18/2008); IR Miscellaneous Procedure (8/25/2008); IR Miscellaneous Procedure (12/18/2008); IR Miscellaneous Procedure (2/18/2009); IR Miscellaneous Procedure (8/4/2009); IR Gastro Jejunostomy Tube Change (8/30/2009); IR Miscellaneous Procedure (11/6/2009); IR Miscellaneous Procedure (12/28/2009); IR Miscellaneous Procedure (4/13/2010); IR Miscellaneous Procedure (6/29/2010); IR Miscellaneous Procedure (10/11/2010); IR Miscellaneous Procedure (1/3/2011); IR Miscellaneous Procedure (2/25/2011); IR Miscellaneous Procedure (5/26/2011); IR Miscellaneous Procedure (8/23/2011); IR Miscellaneous Procedure  (11/17/2011); IR Miscellaneous Procedure (2/15/2012); IR Miscellaneous Procedure (5/18/2012); IR Miscellaneous Procedure (8/16/2012); IR Miscellaneous Procedure (10/25/2012); IR Miscellaneous Procedure (1/14/2013); IR Miscellaneous Procedure (4/19/2013); IR Miscellaneous Procedure (7/24/2013); IR Miscellaneous Procedure (10/24/2013); IR Miscellaneous Procedure (12/23/2013); IR Miscellaneous Procedure (4/3/2014); IR Miscellaneous Procedure (5/20/2014); IR Gastro Jejunostomy Tube Change (8/5/2014); IR Gastro Jejunostomy Tube Change (11/5/2014); IR Gastro Jejunostomy Tube Change (2/18/2015); IR Gastro Jejunostomy Tube Change (5/18/2015); IR Gastro Jejunostomy Tube Change (8/17/2015); IR Gastro Jejunostomy Tube Change (10/28/2015); IR Gastro Jejunostomy Tube Change (1/16/2016); IR Gastro Jejunostomy Tube Change (4/14/2016); IR Gastro Jejunostomy Tube Change (5/13/2016); IR Gastro Jejunostomy Tube Change (6/16/2016); IR Gastro Jejunostomy Tube Change (7/20/2016); IR Gastro Jejunostomy Tube Change (9/7/2016); IR Gastro Jejunostomy Tube Change (10/24/2016); IR Gastro Jejunostomy Tube Change (1/8/2017); IR Gastro Jejunostomy Tube Change (3/19/2017); IR Gastro Jejunostomy Tube Change (4/25/2017); IR Gastro Jejunostomy Tube Change (6/10/2017); IR Gastro Jejunostomy Tube Change (9/11/2017); IR Gastro Jejunostomy Tube Change (12/11/2017); IR Gastro Jejunostomy Tube Change (2/12/2018); IR Gastro Jejunostomy Tube Change (5/16/2018); IR Gastro Jejunostomy Tube Change (8/27/2018); IR Gastro Jejunostomy Tube Change (11/13/2018); IR Gastro Jejunostomy Tube Change (1/8/2019); IR Gastro Jejunostomy Tube Change (2/6/2019); IR Gastro Jejunostomy Tube Change (4/29/2019); IR Gastro Jejunostomy Tube Change (6/25/2019); IR Gastro Jejunostomy Tube Change (9/24/2019); IR Gastro Jejunostomy Tube Change (1/29/2020); IR Gastro Jejunostomy Tube Change (5/6/2020); IR Gastro Jejunostomy Tube Change (8/3/2020); IR Gastro Jejunostomy Tube Change  (11/10/2020); IR Gastro Jejunostomy Tube Change (2/11/2021); IR Gastro Jejunostomy Tube Change (5/17/2021); Spinal Fusion; Ir Gj Tube Replacement (11/13/2018); Ir Gj Tube Replacement (1/8/2019); Ir Gj Tube Replacement (2/6/2019); Ir Gj Tube Replacement (4/29/2019); Ir Gj Tube Replacement (6/25/2019); Ir Gj Tube Replacement (9/24/2019); back surgery; Amputate toe(s) (Right, 11/21/2019); Ir Gj Tube Replacement (1/29/2020); Ir Gj Tube Replacement (5/6/2020); Ir Gj Tube Replacement (8/3/2020); Ir Gj Tube Replacement (11/10/2020); Ir Gj Tube Replacement (2/11/2021); Ir Gj Tube Replacement (5/17/2021); IR Gastro Jejunostomy Tube Change (8/16/2021); IR Gastro Jejunostomy Tube Change (11/19/2021); IR Gastro Jejunostomy Tube Change (2/22/2022); IR Gastro Jejunostomy Tube Change (7/22/2022); IR Gastro Jejunostomy Tube Change (8/12/2022); IR Gastro Jejunostomy Tube Change (11/17/2022); IR Gastro Jejunostomy Tube Change (2/14/2023); IR Gastro Jejunostomy Tube Change (4/19/2023); IR Gastro Jejunostomy Tube Change (6/9/2023); IR Gastro Jejunostomy Tube Change (6/8/2023); IR Gastro Jejunostomy Tube Change (8/25/2023); IR Gastro Jejunostomy Tube Change (10/26/2023); IR Gastro Jejunostomy Tube Change (12/4/2023); and IR Gastro Jejunostomy Tube Change (1/31/2024).    Family History  Reviewed, and family history includes Seizure Disorder in her sister.    Social History  Reviewed, and she  reports that she has never smoked. She has never used smokeless tobacco. She reports that she does not drink alcohol and does not use drugs.    Allergies  Allergies   Allergen Reactions    Vancomycin Hives, Itching and Rash    Ciprofloxacin Rash     Other reaction(s): *Unknown    Aspirin Buf(Cacarb-Mgcarb-Mgo)      Other reaction(s): Unknown    Lanolin      Other reaction(s): *Unknown    Nsaids      Other reaction(s): Unknown    Amoxicillin-Pot Clavulanate Rash     10/26/23 given ceftriaxone at [facility] ED    Ibuprofen Rash     Other reaction(s):  *Unknown    Salicylates Rash     Other reaction(s): Unknown              Christy Gabriel, DO  Pulmonary and Critical Care Attending  pgr [phone]    Securely message with the Vocera Web Console (learn more here)

## 2024-03-06 NOTE — PLAN OF CARE
Problem: Adult Inpatient Plan of Care  Goal: Absence of Hospital-Acquired Illness or Injury  Outcome: Progressing  Intervention: Identify and Manage Fall Risk  Recent Flowsheet Documentation  Taken 3/6/2024 0000 by Igor Evans RN  Safety Promotion/Fall Prevention:   lighting adjusted   room organization consistent   room near nurse's station   safety round/check completed  Taken 3/5/2024 2048 by Igor Evans RN  Safety Promotion/Fall Prevention:   lighting adjusted   room organization consistent   room near nurse's station   safety round/check completed  Intervention: Prevent Skin Injury  Recent Flowsheet Documentation  Taken 3/6/2024 0454 by Igor Evans RN  Body Position: (shifted weight) other (see comments)  Taken 3/6/2024 0210 by Igor Evans RN  Body Position: right  Taken 3/6/2024 0000 by Igor Evans RN  Body Position: left  Taken 3/5/2024 2200 by Igor Evans RN  Body Position: supine     Problem: Gas Exchange Impaired  Goal: Optimal Gas Exchange  Outcome: Progressing  Intervention: Optimize Oxygenation and Ventilation  Recent Flowsheet Documentation  Taken 3/6/2024 0454 by Igor Evans RN  Head of Bed (HOB) Positioning: HOB flat  Taken 3/6/2024 0210 by Igor Evans RN  Head of Bed (HOB) Positioning: HOB at 15 degrees  Taken 3/6/2024 0000 by Igor Evans RN  Head of Bed (HOB) Positioning: HOB at 15 degrees  Taken 3/5/2024 2200 by Igor Evans RN  Head of Bed (HOB) Positioning: HOB at 20-30 degrees   Goal Outcome Evaluation:  VSS currently on RA at rest sating low 90's. Unable to assess mentation but pt does track with eyes at times. FLACC score of 2 this shift but pt appears to be resting comfortably. Intermittently tachy up to 110's. LS course. G tube CDI. IV infusing 75/hr (see MAR). NPO, tube feedings trough G tube no current orders. Will continue with plan of care.

## 2024-03-06 NOTE — ED NOTES
"[facility] ED Handoff Report    ED Chief Complaint: Shortness of Breath    The patient presents with  staff, non verbal at bedside, pt presents non ambulatory in a wheelchair. Pt had wheezing noted by staff. Was seen at the urgency room and MD felt she was mottling and sent to ER for further evaluation.    ED Diagnosis:  (A41.9) Sepsis, due to unspecified organism, unspecified whether acute organ dysfunction present (H)  Comment:   Plan:     (Z86.69) History of cerebral palsy  Comment:   Plan:     (J18.9) Pneumonia due to infectious organism, unspecified laterality, unspecified part of lung  Comment:   Plan:     (K52.89) Stercoral colitis  Comment:   Plan:        PMH:    Past Medical History:   Diagnosis Date    Cerebral palsy (H)     Depression     GERD (gastroesophageal reflux disease)     Hyperopia     Mental retardation     Osteoporosis     Pneumonia due to 2019 novel coronavirus 8/7/2020    Pyelonephritis     Scoliosis     Seizure disorder (H)     UTI (urinary tract infection)         Code Status:  No CPR- Do NOT Intubate     Falls Risk: Yes Band: Applied    Current Living Situation/Residence: lives in a group home     Elimination Status: Continent: No and wears briefs     Activity Level: Total assist/lift    Patients Preferred Language:  English nonverbal     Needed: No    Vital Signs:  /71   Pulse 100   Temp (!) 101.1  F (38.4  C) (Rectal)   Resp 25   Ht 1.626 m (5' 4\")   Wt 42.2 kg (93 lb)   SpO2 95%   BMI 15.96 kg/m       Cardiac Rhythm: Sinus Rhythm/ Sinus Tach    Pain Score: Patient is unable to quantify.    Is the Patient Confused:  AIDAN    Focused Assessment:  Nonverbal, nonambulatory, total assist; lung sounds coarse bilaterally; heart sounds normal; elevated temp, elevated HR, 91% on RA; chronic contractures; dry oral mucosa; 4L O2 via NC; NS fluids @ 75mL/hr; GJ tube labeled. Positive for Pneumonia, sepsis, stercoral colitis. Hx: cerebral palsy.      Tests Performed: " Done: Labs and Imaging    Treatments Provided:  Labs, imaging, abx admin, antihistamine admin, oxygen admin    Family Dynamics/Concerns: No    Family Updated On Visitor Policy: Yes    Plan of Care Communicated to Family: Yes, Dillon SAUER to update    Who Was Updated about Plan of Care: Daughter and Guardian    Belongings Checklist Done and Signed by Patient: Yes    Medications sent with patient: Nystatin and Keppra    Covid: symptomatic, negative    Additional Information: Red man syndrome in reaction to vancomycin. Hives cleared with benadryl.     RN: Pedro Amos RN 3/5/2024 8:05 PM

## 2024-03-06 NOTE — ED NOTES
Pt voided into brief. Purewick applied.    This RN writer also noticed pt's face and chest becoming blotchy red while vanco was infusing. Infusion has been stopped. Paged hospitalist. ED doc ordered benadryl.    Pt appears calmer and redness going down after infusion stopped. VSS

## 2024-03-06 NOTE — PLAN OF CARE
Problem: Adult Inpatient Plan of Care  Goal: Absence of Hospital-Acquired Illness or Injury  3/6/2024 1552 by Katelynn Recio RN  Outcome: Progressing  3/6/2024 1524 by Katelynn Recio RN  Outcome: Progressing  Intervention: Identify and Manage Fall Risk  Recent Flowsheet Documentation  Taken 3/6/2024 0935 by Katelynn Recio RN  Safety Promotion/Fall Prevention:   lighting adjusted   room organization consistent   room near nurse's station   safety round/check completed  Intervention: Prevent Skin Injury  Recent Flowsheet Documentation  Taken 3/6/2024 0935 by Katelynn Recio RN  Body Position: left  Goal: Optimal Comfort and Wellbeing  3/6/2024 1552 by Katelynn Recio RN  Outcome: Progressing  3/6/2024 1524 by Katelynn Recio RN  Outcome: Progressing  Goal: Readiness for Transition of Care  3/6/2024 1552 by Katelynn Recio RN  Outcome: Progressing  3/6/2024 1524 by Katelynn Recio RN  Outcome: Progressing     Problem: Gas Exchange Impaired  Goal: Optimal Gas Exchange  3/6/2024 1552 by Katelynn Recio RN  Outcome: Progressing  3/6/2024 1524 by Katelynn Recio RN  Outcome: Progressing  Intervention: Optimize Oxygenation and Ventilation  Recent Flowsheet Documentation  Taken 3/6/2024 0935 by Katelynn Recio RN  Head of Bed (HOB) Positioning: HOB at 15 degrees     Problem: Enteral Nutrition  Goal: Absence of Aspiration Signs and Symptoms  3/6/2024 1552 by Katelynn Recio RN  Outcome: Progressing  3/6/2024 1524 by Katelynn Recio RN  Outcome: Progressing  Intervention: Minimize Aspiration Risk  Recent Flowsheet Documentation  Taken 3/6/2024 0935 by Katelynn Recio RN  Head of Bed (HOB) Positioning: HOB at 15 degrees  Goal: Safe, Effective Therapy Delivery  3/6/2024 1552 by Katelynn Recio RN  Outcome: Progressing  3/6/2024 1524 by Katelynn Recio RN  Outcome: Progressing  Goal: Feeding Tolerance  Outcome: Progressing   Goal Outcome Evaluation:             Pt alert, opens eyes spontaneously. Nonverbal. Turned q2 hrs  this shift, incont of urine. J/G tube flushed w/ h20 as ordered. Iv abx infusing. No s/s of pain. Group home staff Colleen updated by phone. Bed alarm on. Continue to monitor pt

## 2024-03-06 NOTE — ED NOTES
"[facility] ED Handoff Report    ED Chief Complaint: shortness of breath    ED Diagnosis:  (A41.9) Sepsis, due to unspecified organism, unspecified whether acute organ dysfunction present (H)  Comment:   Plan:     (Z86.69) History of cerebral palsy  Comment:   Plan:     (J18.9) Pneumonia due to infectious organism, unspecified laterality, unspecified part of lung  Comment:   Plan:     (K52.89) Stercoral colitis  Comment:   Plan:        PMH:    Past Medical History:   Diagnosis Date    Cerebral palsy (H)     Depression     GERD (gastroesophageal reflux disease)     Hyperopia     Mental retardation     Osteoporosis     Pneumonia due to 2019 novel coronavirus 8/7/2020    Pyelonephritis     Scoliosis     Seizure disorder (H)     UTI (urinary tract infection)         Code Status:  No CPR- Do NOT Intubate     Falls Risk: Yes Band: Not applicable    Current Living Situation/Residence: lives in a group home     Elimination Status: Continent: no    Activity Level: Total assist/lift    Patients Preferred Language:  English     Needed: No    Vital Signs:  /78   Pulse 104   Temp (!) 101.1  F (38.4  C) (Rectal)   Resp 25   Ht 1.626 m (5' 4\")   Wt 42.2 kg (93 lb)   SpO2 95%   BMI 15.96 kg/m       Cardiac Rhythm: SR/ST    Pain Score: Patient is unable to quantify.    Is the Patient Confused:  Yes    Last Food or Drink:  g tube for feeds    Focused Assessment:  on 4L of O2. Non verbal, from group home. Crackles heard in lungs, will occasionally cough up sputum.     Tests Performed: Done: Labs and Imaging    Treatments Provided:  abx, fluids, benadryl for vanco reaction    Family Dynamics/Concerns: No    Family Updated On Visitor Policy: No    Plan of Care Communicated to Family: No    Who Was Updated about Plan of Care:     Belongings Checklist Done and Signed by Patient: No    Medications sent with patient:     Covid: asymptomatic     Additional Information: multiple attempts were " made to straight cath pt for urine sample. Unsuccessful. Pt did eventually void into brief. Purewick was then placed.    Also had a reaction to vancomycin. Infusion was stopped and pt received 50 mg of benadryl. Symptoms (redness, itching, rash) improved after.     RN: Jakob Wright RN   3/5/2024 6:53 PM

## 2024-03-06 NOTE — CONSULTS
"CLINICAL NUTRITION SERVICES - ASSESSMENT NOTE     Nutrition Prescription    RECOMMENDATIONS FOR MDs/PROVIDERS TO ORDER:    Malnutrition Status:    TBD    Recommendations already ordered by Registered Dietitian (RD):  Initiate Jevity 1.5 (hospital equivalent to Isosource 1.5) at 100 mL/hr x 10 hrs 8 pm-6 am   Water flushes 300 mL 4x/day  Provides: 1500 kcals, 64 gm protein, 216 gm CHO, 21 gm fiber, 760 mL free water (1960 mL with flushes) in 1000 mL formula  Weigh pt  Provide Synthroid dose 1 hr before start of tube feeding    Future/Additional Recommendations:  Monitor TF tolerance, weight, labs     REASON FOR ASSESSMENT  Elysia Arellano is a/an 58 year old female assessed by the dietitian for Provider Order - Registered Dietitian to Assess and Order TF per Medical Nutrition Therapy Protocol    HPI: 58 year old female with history of cerebral palsy, developmental delay, nonverbal, neurogenic bowel and bladder, seizure disorder, hypothyroidism and chronic dysphagia admitted with acute hypoxic respiratory failure and sepsis likely secondary to aspiration pneumonia.     NUTRITION HISTORY  Pt has a G-J tube  Spoke with [facility]  staff, unable to state TF regimen besides being on Isosource 1.5, planning to call  RD and will call back with information, will also verify height.  Call back from [facility], on Isosource (unable to state if 1.5), 1000 mL every 24 hrs, 100 mL/hr and 300 mL water flush via J-port 4x/day. Also states receives 1 scoop of beneprotein mixed with water via G-port daily. Said she left copy of MAR when she dropped off pt, checked paper chart and not in there. Per [facility] unsure of height, but thinks she is 5'1\" as states pt is shorter than her and she is 5'3\".     Will substitute Jevity 1.5, hospital equivalent to Isosource 1.5.    CURRENT NUTRITION ORDERS  Diet: NPO  Intake/Tolerance: na    LABS  Labs reviewed  Na 146 (H)    MEDICATIONS  Medications reviewed, include folic acid, " "Synthroid    ANTHROPOMETRICS  Height: 162.6 cm (5' 4\") questions accuracy, other RD notes show 5'1\", 4'10\"  Most Recent Weight: 42.2 kg (93 lb)    IBW: 47.7 kg (based on height of 5'1\")  BMI: Underweight BMI <18.5  Weight History:   Wt Readings from Last 20 Encounters:   03/05/24 42.2 kg (93 lb)   10/26/23 42.2 kg (93 lb)   05/24/23 44 kg (97 lb)   04/27/22 48.1 kg (106 lb)   04/15/21 57.6 kg (127 lb)   12/05/19 52.6 kg (116 lb)   11/21/19 53 kg (116 lb 14.4 oz)   09/17/19 53.5 kg (118 lb)   08/27/19 53.5 kg (118 lb)     Dosing Weight: 42.2 kg    ASSESSED NUTRITION NEEDS  Estimated Energy Needs: 1779-0322 kcals/day (30 - 35 kcals/kg )  Justification: Underweight  Estimated Protein Needs: 51-63 grams protein/day (1.2 - 1.5 grams of pro/kg)  Justification: Increased needs  Estimated Fluid Needs: 6970-5421 mL/day (1 mL/kcal)   Justification: Increased needs    PHYSICAL FINDINGS  See malnutrition section below.    MALNUTRITION:  % Weight Loss:  Weight loss does not meet criteria for malnutrition   % Intake:  Decreased intake does not meet criteria for malnutrition on home TF  Subcutaneous Fat Loss:  unable, sleeping  Muscle Loss:  unable, sleeping  Fluid Retention:  None noted    Malnutrition Diagnosis: Unable to determine due to need NFPE    NUTRITION DIAGNOSIS  Inadequate oral intake related to dysphagia as evidenced by NPO and reliance on tube feedings.      INTERVENTIONS  Implementation  Nutrition Education: No education needs assessed at this time   Enteral Nutrition - Initiate  Feeding tube flush     Goals  Maintain/gain weight  Meet nutrition needs with TF  Electrolytes WNL     Monitoring/Evaluation  Progress toward goals will be monitored and evaluated per protocol.    "

## 2024-03-06 NOTE — CONSULTS
Care Management Initial Consult    General Information  Assessment completed with: Parents, Guardian/mother  Type of CM/SW Visit: Initial Assessment    Primary Care Provider verified and updated as needed: Yes   Readmission within the last 30 days:   No        Advance Care Planning: Advance Care Planning Reviewed: present on chart          Communication Assessment  Patient's communication style: spoken language (English or Bilingual)             Cognitive  Cognitive/Neuro/Behavioral: .WDL except, mood/behavior, motor response  Level of Consciousness: alert, confused  Arousal Level: arouses to voice  Orientation: other (see comments) (AIDAN)  Mood/Behavior: flat affect  Best Language: 3 - Mute  Speech: unable to speak    Living Environment:   People in home: facility resident     Current living Arrangements: [facility]      Able to return to prior arrangements: yes       Family/Social Support:  Care provided by: other (see comments) (staff)  Provides care for: no one, unable/limited ability to care for self  Marital Status: Single  Guardian, Parent(s), Sibling(s)          Description of Support System: Supportive, Involved    Support Assessment: Adequate family and caregiver support    Current Resources:   Patient receiving home care services:  Group home resident      Community Resources:  County case management.   Equipment currently used at home:  wheelchair  Supplies currently used at home:  incontinence products.     Employment/Financial:  Employment Status: disabled        Financial Concerns:   NA          Does the patient's insurance plan have a 3 day qualifying hospital stay waiver?  Yes     Which insurance plan 3 day waiver is available? [employer]    Will the waiver be used for post-acute placement? Undetermined at this time    Lifestyle & Psychosocial Needs:  Social Determinants of Health     Food Insecurity: Not on file   Depression: Not on file   Housing Stability: Not on file   Tobacco Use:  Low Risk  (1/31/2024)    Patient History     Smoking Tobacco Use: Never     Smokeless Tobacco Use: Never     Passive Exposure: Not on file   Financial Resource Strain: Not on file   Alcohol Use: Not At Risk (4/14/2021)    AUDIT-C     Frequency of Alcohol Consumption: Never     Average Number of Drinks: Not asked     Frequency of Binge Drinking: Never   Transportation Needs: Not on file   Physical Activity: Not on file   Interpersonal Safety: Not on file   Stress: Not on file   Social Connections: Not on file       Functional Status:  Prior to admission patient needed assistance:   Dependent ADLs:: Bathing, Dressing, Eating, Grooming, Incontinence, Positioning, Transfers, Wheelchair-with assist  Dependent IADLs:: Cleaning, Cooking, Laundry, Shopping, Meal Preparation, Medication Management, Money Management, Transportation, Incontinence       Mental Health Status:  Mental Health Status: No Current Concerns       Chemical Dependency Status:  Chemical Dependency Status: No Current Concerns             Values/Beliefs:  Spiritual, Cultural Beliefs, Adventist Practices, Values that affect care:       NA          Additional Information:  ADDISON placed call to pt [facility]/guardian/mother Reshma. Pt is developmentally approximately 3 months of age per Reshma. Pt lives at [facility] head  RN [place]- [phone]. Per Reshma, pt has [facility] (?)  and staff provide all cares for pt. Pt to be transported via medical transport wheelchair when discharged per Reshma.   ADDISON left voicemail for Marion at [facility] with contact number.     Jennifer Ge, MSW

## 2024-03-06 NOTE — ED NOTES
Pt redness in face and chest decreasing after benadryl. Pt less restless as well. Vitals remain stable

## 2024-03-07 LAB
ANION GAP SERPL CALCULATED.3IONS-SCNC: 12 MMOL/L (ref 7–15)
BUN SERPL-MCNC: 26.8 MG/DL (ref 6–20)
CALCIUM SERPL-MCNC: 8 MG/DL (ref 8.6–10)
CHLORIDE SERPL-SCNC: 108 MMOL/L (ref 98–107)
CREAT SERPL-MCNC: 0.34 MG/DL (ref 0.51–0.95)
DEPRECATED HCO3 PLAS-SCNC: 19 MMOL/L (ref 22–29)
EGFRCR SERPLBLD CKD-EPI 2021: >90 ML/MIN/1.73M2
ERYTHROCYTE [DISTWIDTH] IN BLOOD BY AUTOMATED COUNT: 14.6 % (ref 10–15)
GLUCOSE SERPL-MCNC: 116 MG/DL (ref 70–99)
HCT VFR BLD AUTO: 39.7 % (ref 35–47)
HGB BLD-MCNC: 12.1 G/DL (ref 11.7–15.7)
MCH RBC QN AUTO: 30.2 PG (ref 26.5–33)
MCHC RBC AUTO-ENTMCNC: 30.5 G/DL (ref 31.5–36.5)
MCV RBC AUTO: 99 FL (ref 78–100)
MRSA DNA SPEC QL NAA+PROBE: POSITIVE
PLATELET # BLD AUTO: 205 10E3/UL (ref 150–450)
POTASSIUM SERPL-SCNC: 3.2 MMOL/L (ref 3.4–5.3)
POTASSIUM SERPL-SCNC: 3.6 MMOL/L (ref 3.4–5.3)
RBC # BLD AUTO: 4.01 10E6/UL (ref 3.8–5.2)
SA TARGET DNA: POSITIVE
SODIUM SERPL-SCNC: 139 MMOL/L (ref 135–145)
WBC # BLD AUTO: 10.1 10E3/UL (ref 4–11)

## 2024-03-07 PROCEDURE — 80048 BASIC METABOLIC PNL TOTAL CA: CPT | Performed by: INTERNAL MEDICINE

## 2024-03-07 PROCEDURE — 272N000098

## 2024-03-07 PROCEDURE — 94667 MNPJ CHEST WALL 1ST: CPT

## 2024-03-07 PROCEDURE — 99222 1ST HOSP IP/OBS MODERATE 55: CPT | Performed by: INTERNAL MEDICINE

## 2024-03-07 PROCEDURE — 94668 MNPJ CHEST WALL SBSQ: CPT

## 2024-03-07 PROCEDURE — 120N000001 HC R&B MED SURG/OB

## 2024-03-07 PROCEDURE — 85027 COMPLETE CBC AUTOMATED: CPT | Performed by: INTERNAL MEDICINE

## 2024-03-07 PROCEDURE — 250N000013 HC RX MED GY IP 250 OP 250 PS 637: Performed by: INTERNAL MEDICINE

## 2024-03-07 PROCEDURE — 999N000157 HC STATISTIC RCP TIME EA 10 MIN

## 2024-03-07 PROCEDURE — 82565 ASSAY OF CREATININE: CPT | Performed by: INTERNAL MEDICINE

## 2024-03-07 PROCEDURE — 84132 ASSAY OF SERUM POTASSIUM: CPT | Performed by: INTERNAL MEDICINE

## 2024-03-07 PROCEDURE — 99233 SBSQ HOSP IP/OBS HIGH 50: CPT | Performed by: INTERNAL MEDICINE

## 2024-03-07 PROCEDURE — 99232 SBSQ HOSP IP/OBS MODERATE 35: CPT | Performed by: INTERNAL MEDICINE

## 2024-03-07 PROCEDURE — 250N000011 HC RX IP 250 OP 636: Performed by: INTERNAL MEDICINE

## 2024-03-07 PROCEDURE — 36415 COLL VENOUS BLD VENIPUNCTURE: CPT | Performed by: INTERNAL MEDICINE

## 2024-03-07 RX ORDER — POTASSIUM CHLORIDE 1500 MG/1
20 TABLET, EXTENDED RELEASE ORAL ONCE
Status: COMPLETED | OUTPATIENT
Start: 2024-03-07 | End: 2024-03-07

## 2024-03-07 RX ORDER — POTASSIUM CHLORIDE 20MEQ/15ML
20 LIQUID (ML) ORAL ONCE
Status: COMPLETED | OUTPATIENT
Start: 2024-03-07 | End: 2024-03-07

## 2024-03-07 RX ADMIN — FOLIC ACID 1 MG: 1 TABLET ORAL at 10:44

## 2024-03-07 RX ADMIN — LEVETIRACETAM 1000 MG: 100 SOLUTION ORAL at 19:54

## 2024-03-07 RX ADMIN — DESONIDE: 0.5 CREAM TOPICAL at 10:42

## 2024-03-07 RX ADMIN — LAMOTRIGINE 200 MG: 100 TABLET ORAL at 19:54

## 2024-03-07 RX ADMIN — LAMOTRIGINE 200 MG: 100 TABLET ORAL at 10:44

## 2024-03-07 RX ADMIN — TOPIRAMATE 200 MG: 100 TABLET, FILM COATED ORAL at 19:55

## 2024-03-07 RX ADMIN — POLYETHYLENE GLYCOL 3350 17 G: 17 POWDER, FOR SOLUTION ORAL at 10:46

## 2024-03-07 RX ADMIN — NYSTATIN: 100000 CREAM TOPICAL at 10:45

## 2024-03-07 RX ADMIN — TOPIRAMATE 200 MG: 100 TABLET, FILM COATED ORAL at 10:45

## 2024-03-07 RX ADMIN — LEVETIRACETAM 1000 MG: 100 SOLUTION ORAL at 10:44

## 2024-03-07 RX ADMIN — METRONIDAZOLE 500 MG: 500 INJECTION, SOLUTION INTRAVENOUS at 06:27

## 2024-03-07 RX ADMIN — DESONIDE: 0.5 CREAM TOPICAL at 19:55

## 2024-03-07 RX ADMIN — CEFTRIAXONE SODIUM 1 G: 1 INJECTION, POWDER, FOR SOLUTION INTRAMUSCULAR; INTRAVENOUS at 21:14

## 2024-03-07 RX ADMIN — METRONIDAZOLE 500 MG: 500 INJECTION, SOLUTION INTRAVENOUS at 15:02

## 2024-03-07 RX ADMIN — LEVOTHYROXINE SODIUM 50 MCG: 0.03 TABLET ORAL at 19:54

## 2024-03-07 RX ADMIN — POTASSIUM CHLORIDE 20 MEQ: 20 SOLUTION ORAL at 10:45

## 2024-03-07 RX ADMIN — LAMOTRIGINE 75 MG: 25 TABLET ORAL at 10:44

## 2024-03-07 RX ADMIN — ENOXAPARIN SODIUM 30 MG: 30 INJECTION SUBCUTANEOUS at 19:54

## 2024-03-07 RX ADMIN — LAMOTRIGINE 75 MG: 25 TABLET ORAL at 19:55

## 2024-03-07 RX ADMIN — NYSTATIN: 100000 CREAM TOPICAL at 19:54

## 2024-03-07 RX ADMIN — Medication 1 DROP: at 21:14

## 2024-03-07 RX ADMIN — Medication 40 MG: at 10:45

## 2024-03-07 ASSESSMENT — ACTIVITIES OF DAILY LIVING (ADL)
ADLS_ACUITY_SCORE: 69
ADLS_ACUITY_SCORE: 73
ADLS_ACUITY_SCORE: 71
ADLS_ACUITY_SCORE: 69
ADLS_ACUITY_SCORE: 71
ADLS_ACUITY_SCORE: 69
ADLS_ACUITY_SCORE: 71
ADLS_ACUITY_SCORE: 69
ADLS_ACUITY_SCORE: 69
ADLS_ACUITY_SCORE: 71
ADLS_ACUITY_SCORE: 69
ADLS_ACUITY_SCORE: 73
ADLS_ACUITY_SCORE: 69
ADLS_ACUITY_SCORE: 69
ADLS_ACUITY_SCORE: 73
ADLS_ACUITY_SCORE: 71
ADLS_ACUITY_SCORE: 69
ADLS_ACUITY_SCORE: 71
ADLS_ACUITY_SCORE: 73
ADLS_ACUITY_SCORE: 73
ADLS_ACUITY_SCORE: 71

## 2024-03-07 NOTE — PROGRESS NOTES
Pulmonary/Critical Care Consult Team Note    Elysia Arellano,  1965, MRN [id number]  Admitting Dx: History of cerebral palsy [Z86.69]  Pneumonia due to infectious organism, unspecified laterality, unspecified part of lung [J18.9]  Sepsis, due to unspecified organism, unspecified whether acute organ dysfunction present (H) [A41.9]  Stercoral colitis [K52.89]  Date / Time of Admission:  3/5/2024 12:41 PM    Unable to get Hx due to pt being non-verbal    Intake/Output last 3 shifts:  I/O last 3 completed shifts:  In: 4129 [I.V.:808; NG/GT:2168]  Out: -     Assessment/Plan: Elysia Arellano is a 58 year old female with PMHx of cerebral palsy, developmental delay, seizure disorder, GERD, chronic GJ tube with acute resp failure and dyspnea with wheezing.    Aspriation PNA  - debris etc in airways on CT scan  - I assume this is a chronic problem and is NPO at baseline, would consider bronchoscopy, however would be high risk with general anesthesia for little benefit if this is a chronic condition  - would continue antibiotics  - unable to do flutter valve  - can try vest therapy  - albuterol nebs for wheezing - changed to PRN    UTI  - growing Klebsiella and Enterococcus    Would do a full 7-10 day course of antibiotics for her PNA, Will sign off, please let our service know if any change in clinical condition or questions. D/w Dr. Bateman       Medical Care Time excluding procedures and family discussions greater than: 45min    Risk Factors Present on Admission:  Clinically Significant Risk Factors        # Hypokalemia: Lowest K = 3.2 mmol/L in last 2 days, will replace as needed  # Hypernatremia: Highest Na = 146 mmol/L in last 2 days, will monitor as appropriate  # Hypocalcemia: Lowest Ca = 8 mg/dL in last 2 days, will monitor and replace as appropriate  # Hypercalcemia: Highest Ca = 10.3 mg/dL in last 2 days, will monitor as appropriate               # Cachexia: Estimated body mass index is 17.48 kg/m  as  "calculated from the following:    Height as of this encounter: 1.626 m (5' 4\").    Weight as of this encounter: 46.2 kg (101 lb 13.6 oz)., PRESENT ON ADMISSION           Code Status: No CPR- Do NOT Intubate         Christy Gabriel, DO  Pulmonary and Critical Care Attending  pgr [phone]    Allergies   Allergen Reactions    Vancomycin Hives, Itching and Rash    Ciprofloxacin Rash     Other reaction(s): *Unknown    Aspirin Buf(Cacarb-Mgcarb-Mgo)      Other reaction(s): Unknown    Lanolin      Other reaction(s): *Unknown    Nsaids      Other reaction(s): Unknown    Amoxicillin-Pot Clavulanate Rash     10/26/23 given [facility]riaxone at [facility] ED    Ibuprofen Rash     Other reaction(s): *Unknown    Salicylates Rash     Other reaction(s): Unknown       Meds: See MAR    Physical Exam:  /57 (BP Location: Left arm)   Pulse 96   Temp 98.2  F (36.8  C) (Oral)   Resp 18   Ht 1.626 m (5' 4\")   Wt 46.2 kg (101 lb 13.6 oz)   SpO2 100%   BMI 17.48 kg/m    Intake/Output this shift:  No intake/output data recorded.  GEN: laying in bed  HEENT: MMM, poor dentition  CVS: regular rhythm, no murmurs  RESP: rhonchi at bases, poor air mvmt on left, no wheezing  ABD: abd binder in place, G tube  EXT: Warm, well perfused, no edema  NEURO: contracted, moving head, moving upper extremities, not following commands  PSYCH: pleasant    Pertinent Labs: Latest lab results in EHR personally reviewed.   CMP  Recent Labs   Lab 03/07/24  0546 03/06/24  0546 03/05/24  1323    146* 146*   POTASSIUM 3.2* 4.3 3.2*   CHLORIDE 108* 113* 109*   CO2 19* 21* 27   ANIONGAP 12 12 10   * 115* 138*   BUN 26.8* 31.2* 43.6*   CR 0.34* 0.37* 0.36*   GFRESTIMATED >90 >90 >90   NATHALIA 8.0* 8.6 10.3*   MAG  --   --  2.4*   PROTTOTAL  --   --  7.9   ALBUMIN  --   --  4.0   BILITOTAL  --   --  0.3   ALKPHOS  --   --  250*   AST  --   --  22   ALT  --   --  9     CBC  Recent Labs   Lab 03/07/24  0546 03/06/24  0546 03/05/24  1323   WBC 10.1 16.1* " 21.3*   RBC 4.01 4.61 4.94   HGB 12.1 13.9 14.7   HCT 39.7 47.0 47.7*   MCV 99 102* 97   MCH 30.2 30.2 29.8   MCHC 30.5* 29.6* 30.8*   RDW 14.6 14.6 14.6    207 296     INRNo lab results found in last 7 days.  Arterial Blood Gas  Recent Labs   Lab 03/05/24  1323   O2PER 21       Cultures: personally reviewed.   7-Day Micro Results    Collected Updated Procedure Result Status    03/05/2024 2029 03/06/2024 1419 Urine Culture [[id number]]   Urine, Midstream    Preliminary result Component Value   Culture Culture in progress P             03/05/2024 1906 03/05/2024 1947 Symptomatic Influenza A/B, RSV, & SARS-CoV2 PCR (COVID-19) Nasopharyngeal [[id number]]    Swab from Nasopharyngeal    Final result Component Value   Influenza A PCR Negative   Influenza B PCR Negative   RSV PCR Negative   SARS CoV2 PCR Negative   NEGATIVE: SARS-CoV-2 (COVID-19) RNA not detected, presumed negative.          03/05/2024 1414 03/06/2024 0547 Blood Culture Peripheral Blood [[id number]]   Peripheral Blood    Preliminary result Component Value   Culture No growth after 12 hours P             03/05/2024 1323 03/06/2024 0546 Blood Culture Peripheral Blood [[id number]]   Peripheral Blood    Preliminary result Component Value   Culture No growth after 12 hours P                 Imaging: personally reviewed.   EXAM: CT CHEST/ABDOMEN/PELVIS W CONTRAST  LOCATION: [facility]  DATE: 3/5/2024     INDICATION: cough, sepsis, nonverbal baseline  COMPARISON: 3/1/2017  TECHNIQUE: CT scan of the chest, abdomen, and pelvis was performed following injection of IV contrast. Multiplanar reformats were obtained. Dose reduction techniques were used.   CONTRAST: isovue 370  50ml     FINDINGS:   LUNGS AND PLEURA: Extensive endobronchial debris throughout the left main bronchus and its branches with associated consolidation in the lingula and left lower lobe. The right lung is clear, although evaluation is somewhat limited by  respiratory motion.     MEDIASTINUM/AXILLAE: No lymphadenopathy. No thoracic aortic aneurysms. Aberrant retroesophageal course of the right subclavian artery.     CORONARY ARTERY CALCIFICATION: None.      HEPATOBILIARY: Normal.     PANCREAS: Normal.     SPLEEN: Normal.     ADRENAL GLANDS: Normal.     KIDNEYS/BLADDER: Normal.     BOWEL: Percutaneous gastrojejunostomy tube with balloon in the stomach and tip in the proximal jejunum. Large amount of retained stool throughout the colon, including a large stool ball in the distal sigmoid colon and rectum. Mild rectal wall thickening,   but no ulceration or extraluminal gas.     LYMPH NODES: Normal.     VASCULATURE: Unremarkable.     PELVIC ORGANS: Normal.     MUSCULOSKELETAL: Fusion rods throughout the spine with dextroconvex curvature.                                                                      IMPRESSION:  1.  Extensive endobronchial debris throughout the left main bronchus and left lung airways concerning for mucous plugging and/or aspiration. There is associated consolidation in the lingula and left lower lobe.     2.  Large amount of stool in the distal sigmoid colon and rectum suggesting stercoral colitis    Patient Active Problem List   Diagnosis    Scoliosis    Intellectual disability    Cerebral palsy (H)    Idiopathic generalized epilepsy, intractable (H)    Gastroesophageal reflux disease without esophagitis    History of Nissen fundoplication    Osteoporosis    Absence of toe of right foot (H24)    Gastrostomy present (H)    Urinary incontinence    Retention of urine    Neurogenic bladder    CP (cerebral palsy), spastic, quadriplegic (H)    Constipation    At high risk for falls    History of cerebral palsy    Pneumonia due to infectious organism, unspecified laterality, unspecified part of lung    Sepsis, due to unspecified organism, unspecified whether acute organ dysfunction present (H)    Stercoral colitis         Surgical History  She  has a  past surgical history that includes IR Gastro Jejunostomy Tube Placement; Scoliosis S/P Lewis Valentín Placement; IR Miscellaneous Procedure (6/19/2000); IR Abscess Tube Change (6/27/2000); IR Abscess Tube Change (9/5/2000); IR Abscess Tube Change (3/15/2001); IR Abscess Tube Change (7/6/2001); IR Abscess Tube Change (9/4/2001); IR Miscellaneous Procedure (12/22/2001); IR Miscellaneous Procedure (1/7/2002); IR Miscellaneous Procedure (1/19/2002); IR Miscellaneous Procedure (1/19/2002); IR Miscellaneous Procedure (2/20/2002); IR Miscellaneous Procedure (2/20/2002); IR Miscellaneous Procedure (3/19/2002); IR Miscellaneous Procedure (4/16/2002); IR Miscellaneous Procedure (7/11/2002); IR Miscellaneous Procedure (7/11/2002); IR Gastrostomy Tube Change (7/18/2002); IR Miscellaneous Procedure (7/30/2002); IR Miscellaneous Procedure (8/16/2002); IR Miscellaneous Procedure (8/31/2002); IR Miscellaneous Procedure (9/18/2002); IR Miscellaneous Procedure (3/1/2003); IR Miscellaneous Procedure (8/5/2003); IR Miscellaneous Procedure (1/29/2004); IR Miscellaneous Procedure (3/18/2004); IR Miscellaneous Procedure (7/21/2004); IR Miscellaneous Procedure (11/15/2004); IR Miscellaneous Procedure (2/18/2005); IR Miscellaneous Procedure (4/8/2005); IR Miscellaneous Procedure (6/30/2005); IR Miscellaneous Procedure (9/30/2005); IR Miscellaneous Procedure (12/20/2005); IR Miscellaneous Procedure (3/28/2006); IR Miscellaneous Procedure (6/30/2006); IR Miscellaneous Procedure (10/30/2006); IR Miscellaneous Procedure (2/28/2007); IR Miscellaneous Procedure (6/19/2007); IR Miscellaneous Procedure (9/25/2007); IR Miscellaneous Procedure (12/10/2007); IR Miscellaneous Procedure (1/5/2008); IR Miscellaneous Procedure (6/18/2008); IR Miscellaneous Procedure (8/25/2008); IR Miscellaneous Procedure (12/18/2008); IR Miscellaneous Procedure (2/18/2009); IR Miscellaneous Procedure (8/4/2009); IR Gastro Jejunostomy Tube Change (8/30/2009); IR  Miscellaneous Procedure (11/6/2009); IR Miscellaneous Procedure (12/28/2009); IR Miscellaneous Procedure (4/13/2010); IR Miscellaneous Procedure (6/29/2010); IR Miscellaneous Procedure (10/11/2010); IR Miscellaneous Procedure (1/3/2011); IR Miscellaneous Procedure (2/25/2011); IR Miscellaneous Procedure (5/26/2011); IR Miscellaneous Procedure (8/23/2011); IR Miscellaneous Procedure (11/17/2011); IR Miscellaneous Procedure (2/15/2012); IR Miscellaneous Procedure (5/18/2012); IR Miscellaneous Procedure (8/16/2012); IR Miscellaneous Procedure (10/25/2012); IR Miscellaneous Procedure (1/14/2013); IR Miscellaneous Procedure (4/19/2013); IR Miscellaneous Procedure (7/24/2013); IR Miscellaneous Procedure (10/24/2013); IR Miscellaneous Procedure (12/23/2013); IR Miscellaneous Procedure (4/3/2014); IR Miscellaneous Procedure (5/20/2014); IR Gastro Jejunostomy Tube Change (8/5/2014); IR Gastro Jejunostomy Tube Change (11/5/2014); IR Gastro Jejunostomy Tube Change (2/18/2015); IR Gastro Jejunostomy Tube Change (5/18/2015); IR Gastro Jejunostomy Tube Change (8/17/2015); IR Gastro Jejunostomy Tube Change (10/28/2015); IR Gastro Jejunostomy Tube Change (1/16/2016); IR Gastro Jejunostomy Tube Change (4/14/2016); IR Gastro Jejunostomy Tube Change (5/13/2016); IR Gastro Jejunostomy Tube Change (6/16/2016); IR Gastro Jejunostomy Tube Change (7/20/2016); IR Gastro Jejunostomy Tube Change (9/7/2016); IR Gastro Jejunostomy Tube Change (10/24/2016); IR Gastro Jejunostomy Tube Change (1/8/2017); IR Gastro Jejunostomy Tube Change (3/19/2017); IR Gastro Jejunostomy Tube Change (4/25/2017); IR Gastro Jejunostomy Tube Change (6/10/2017); IR Gastro Jejunostomy Tube Change (9/11/2017); IR Gastro Jejunostomy Tube Change (12/11/2017); IR Gastro Jejunostomy Tube Change (2/12/2018); IR Gastro Jejunostomy Tube Change (5/16/2018); IR Gastro Jejunostomy Tube Change (8/27/2018); IR Gastro Jejunostomy Tube Change (11/13/2018); IR Gastro Jejunostomy Tube  Change (1/8/2019); IR Gastro Jejunostomy Tube Change (2/6/2019); IR Gastro Jejunostomy Tube Change (4/29/2019); IR Gastro Jejunostomy Tube Change (6/25/2019); IR Gastro Jejunostomy Tube Change (9/24/2019); IR Gastro Jejunostomy Tube Change (1/29/2020); IR Gastro Jejunostomy Tube Change (5/6/2020); IR Gastro Jejunostomy Tube Change (8/3/2020); IR Gastro Jejunostomy Tube Change (11/10/2020); IR Gastro Jejunostomy Tube Change (2/11/2021); IR Gastro Jejunostomy Tube Change (5/17/2021); Spinal Fusion; Ir Gj Tube Replacement (11/13/2018); Ir Gj Tube Replacement (1/8/2019); Ir Gj Tube Replacement (2/6/2019); Ir Gj Tube Replacement (4/29/2019); Ir Gj Tube Replacement (6/25/2019); Ir Gj Tube Replacement (9/24/2019); back surgery; Amputate toe(s) (Right, 11/21/2019); Ir Gj Tube Replacement (1/29/2020); Ir Gj Tube Replacement (5/6/2020); Ir Gj Tube Replacement (8/3/2020); Ir Gj Tube Replacement (11/10/2020); Ir Gj Tube Replacement (2/11/2021); Ir Gj Tube Replacement (5/17/2021); IR Gastro Jejunostomy Tube Change (8/16/2021); IR Gastro Jejunostomy Tube Change (11/19/2021); IR Gastro Jejunostomy Tube Change (2/22/2022); IR Gastro Jejunostomy Tube Change (7/22/2022); IR Gastro Jejunostomy Tube Change (8/12/2022); IR Gastro Jejunostomy Tube Change (11/17/2022); IR Gastro Jejunostomy Tube Change (2/14/2023); IR Gastro Jejunostomy Tube Change (4/19/2023); IR Gastro Jejunostomy Tube Change (6/9/2023); IR Gastro Jejunostomy Tube Change (6/8/2023); IR Gastro Jejunostomy Tube Change (8/25/2023); IR Gastro Jejunostomy Tube Change (10/26/2023); IR Gastro Jejunostomy Tube Change (12/4/2023); and IR Gastro Jejunostomy Tube Change (1/31/2024).    Family History  Reviewed, and family history includes Seizure Disorder in her sister.    Social History  Reviewed, and she  reports that she has never smoked. She has never used smokeless tobacco. She reports that she does not drink alcohol and does not use drugs.    Allergies  Allergies   Allergen  Reactions    Vancomycin Hives, Itching and Rash    Ciprofloxacin Rash     Other reaction(s): *Unknown    Aspirin Buf(Cacarb-Mgcarb-Mgo)      Other reaction(s): Unknown    Lanolin      Other reaction(s): *Unknown    Nsaids      Other reaction(s): Unknown    Amoxicillin-Pot Clavulanate Rash     10/26/23 given ceftriaxone at [facility] ED    Ibuprofen Rash     Other reaction(s): *Unknown    Salicylates Rash     Other reaction(s): Unknown              Christy Gabriel DO  Pulmonary and Critical Care Attending  pgr [phone]    Securely message with the Vocera Web Console (learn more here)

## 2024-03-07 NOTE — PROGRESS NOTES
[facility]    Medicine Progress Note - Hospitalist Service    Date of Admission:  3/5/2024    Assessment & Plan   Elysia Arellano is a 58 year old female with history of cerebral palsy, developmental delay, nonverbal, neurogenic bowel and bladder, seizure disorder, hypothyroidism and chronic dysphagia admitted on 3/5/2024 with acute hypoxic respiratory failure and sepsis likely secondary to aspiration pneumonia.        Sepsis  Acute hypoxemic respiratory failure  -- Suspect secondary to aspiration pneumonia. CT on admission shows extensive endobronchial debris left main bronchus and left lung concerning for mucous plugging and/or aspiration with associated consolidation   -- Received IV cefepime given PCN allergy however there is risk of neurotoxicity and in setting of seizure disorder, changed to IV ceftriaxone and flagyl to cover aspiration PNA  -- Follow-up blood culture results  -- UA- pyuria noted. Above antibitoics should be sufficient  -- Continue oxygen as needed   -- Appreciate pulm consult. Discussed with Dr Doran. Unable to do flutter valve. Vest therapy started. If oxygenation doesnt improve, can re-consider bronchoscopy per pulm     Vancomycin allergy: initially started on IV vanco as part of sepsis protocol however developed rash  -- Stop vanco  -- Allergy added  -- PRN benadryl given  -- Monitor  -- MRSA swab- positive. Appreciate ID consult. As the patient is making clinical progress without MRSA coverage, there is no need for MRSA coverage per ID. It could be just a MRSA colonization     Imaging evidence of possible stercoral colitis  Known history of neurogenic bowel related to cerebral palsy  -- MiraLAX twice daily, senna as needed, escalate bowel regimen as needed     Known history of oropharyngeal dysphagia  -- Has G-tube in place and is on chronic tube feeds  -- Strict n.p.o. Patient has been strict NPO for many years  -- Nutrition consult to resume tube feeds    "  Mild hypernatremia:  -- Continue water flushes per home routine  -- Resoved. Hypotonic IVF stopped.     Hypokalemia:  -- Replacement protocol  -- Placed on potassium containing IV fluids     Mild hypercalcemia: likely related to dehydration and home scheduled Oscal  -- Hold home Oscal  -- Trend for improvement with IVF     Mild troponin elevation consistent with demand ischemia  -- Recheck troponin only 30 compared to 29 on admission, EKG with sinus tachycardia  -- Stop trending troponin  -- Treat sepsis as above     Seizure disorder: Continue home Lamictal and Keppra     Hypothyroidism: Continue home replacement, TSH acceptable     GERD: Continue home PPI             Diet: NPO for Medical/Clinical Reasons Except for: NPO but receiving Tube Feeding  Adult Formula Drip Feeding: Continuous Jevity 1.5; Jejunostomy; Goal Rate: 100; mL/hr; From: 8:00 PM; To: 6:00 AM; Provide Synthroid dose 1 hr before starting TF    DVT Prophylaxis: Enoxaparin (Lovenox) SQ  Araujo Catheter: Not present  Lines: None     Cardiac Monitoring: None  Code Status: No CPR- Do NOT Intubate      Clinically Significant Risk Factors        # Hypokalemia: Lowest K = 3.2 mmol/L in last 2 days, will replace as needed  # Hypernatremia: Highest Na = 146 mmol/L in last 2 days, will monitor as appropriate                 # Cachexia: Estimated body mass index is 17.48 kg/m  as calculated from the following:    Height as of this encounter: 1.626 m (5' 4\").    Weight as of this encounter: 46.2 kg (101 lb 13.6 oz)., PRESENT ON ADMISSION            Disposition Plan      Expected Discharge Date: 03/09/2024    Discharge Delays: IV Medication - consider oral or Home Infusion  Destination: group home              MALIA Kuar  Hospitalist Service  [facility]  Securely message with [employer] (more info)  Text page via [facility] Paging/Directory   ______________________________________________________________________    Interval History "   Patient is non-verbal. ROS is unobtainable. Patient looks clinically better today.     Physical Exam   Vital Signs: Temp: 98.2  F (36.8  C) Temp src: Oral BP: 103/57 Pulse: 96   Resp: 18 SpO2: 97 % O2 Device: None (Room air)    Weight: 101 lbs 13.64 oz      General: Not in obvious distress.  HEENT: NC, AT   Chest: Coarse breath sounds, mostly on the left side  Heart: S1S2 normal, regular. No M/R/G  Abdomen: Soft. NT, ND. Bowel sounds- active. GJ in place  Extremities: No legs swelling  Neuro: Non-verbal. Opens eyes. Doesn't follow commands. Extremites are contracted. Extremity muscle wasting due to non-use noted.       Medical Decision Making             Data     I have personally reviewed the following data over the past 24 hrs:    10.1  \   12.1   / 205     139 108 (H) 26.8 (H) /  116 (H)   3.6 19 (L) 0.34 (L) \       Imaging results reviewed over the past 24 hrs:   No results found for this or any previous visit (from the past 24 hour(s)).

## 2024-03-07 NOTE — PLAN OF CARE
Problem: Adult Inpatient Plan of Care  Goal: Absence of Hospital-Acquired Illness or Injury  Outcome: Progressing  Intervention: Identify and Manage Fall Risk  Recent Flowsheet Documentation  Taken 3/7/2024 1030 by Leatha Agrawal RN  Safety Promotion/Fall Prevention:   room organization consistent   safety round/check completed  Intervention: Prevent Skin Injury  Recent Flowsheet Documentation  Taken 3/7/2024 1353 by Leatha Agrawal RN  Body Position:   turned   left  Goal: Optimal Comfort and Wellbeing  Outcome: Progressing  Intervention: Monitor Pain and Promote Comfort  Recent Flowsheet Documentation  Taken 3/7/2024 1030 by Leatha Agrawal RN  Pain Management Interventions:   emotional support   distraction   Goal Outcome Evaluation: Patient is alert, non verbal. No evidence of pain at this time. Turned Q2H. Incontinent of bowel and bladder with 2 stools today, the second one being watery. Continuing to monitor.

## 2024-03-07 NOTE — PLAN OF CARE
Problem: Adult Inpatient Plan of Care  Goal: Optimal Comfort and Wellbeing  Outcome: Progressing   Goal Outcome Evaluation:  No new events overnight. No s/s pain.  Noted rash on upper extremities and neck.  Now noted on back and buttocks. Pt tolerated tube feeding overnight.  Turned off this am at 0630.  Water flushes given per order.  Turned and repositioned for comfort.  Incontinent of large urine this am. No bm noted.  Pt has remained on room air and sats are in the mid 90's.

## 2024-03-07 NOTE — PLAN OF CARE
Problem: Adult Inpatient Plan of Care  Goal: Absence of Hospital-Acquired Illness or Injury  Intervention: Identify and Manage Fall Risk  Recent Flowsheet Documentation  Taken 3/6/2024 1620 by Barbour, Kim, RN  Safety Promotion/Fall Prevention:   lighting adjusted   room organization consistent   room near nurse's station   safety round/check completed   increased rounding and observation   room door open     Problem: Adult Inpatient Plan of Care  Goal: Absence of Hospital-Acquired Illness or Injury  Intervention: Prevent Skin Injury  Recent Flowsheet Documentation  Taken 3/6/2024 2154 by Barbour, Kim, RN  Body Position:   turned   right   legs elevated  Taken 3/6/2024 2029 by Kim Barbour RN  Body Position:   turned   legs elevated  Taken 3/6/2024 1751 by Barbour, Kim, RN  Body Position:   turned   left   legs elevated  Taken 3/6/2024 1620 by Barbour, Kim, RN  Body Position: position maintained     Problem: Adult Inpatient Plan of Care  Goal: Readiness for Transition of Care  Intervention: Mutually Develop Transition Plan  Recent Flowsheet Documentation  Taken 3/6/2024 1800 by Kim Barbour RN  Equipment Currently Used at Home: lift device     Problem: Enteral Nutrition  Goal: Absence of Aspiration Signs and Symptoms  Intervention: Minimize Aspiration Risk  Recent Flowsheet Documentation  Taken 3/6/2024 2154 by Kim Barbour RN  Head of Bed (HOB) Positioning: HOB at 30 degrees  Taken 3/6/2024 2029 by Kim Barbour RN  Head of Bed (HOB) Positioning: HOB at 20-30 degrees  Taken 3/6/2024 1751 by Kim Barbour RN  Head of Bed (HOB) Positioning: HOB at 20-30 degrees  Taken 3/6/2024 1620 by Kim Barbour RN  Head of Bed (HOB) Positioning: HOB at 20-30 degrees   Goal Outcome Evaluation:  Patient alert, but non-verbal. Contact precautions maintained. Vitals stable on room air. Sats around 99%. NPO. Moving with assist of 2 using ryan lift. D5 infusing  at 50 ml/hr. IV antibiotics. Potassium protocol. GJ tube in place, sterile water flush of 300 cc at 1800. Tube feeding started at 2000, running at 100 ml/hr. Incontinent of bowel and bladder. Turned and repositioned every 2 hours. No signs of pain noted.

## 2024-03-07 NOTE — PROGRESS NOTES
Vest therapy started today, pt tolerated well. SpO2 maintained above 92% on RA throughout treatment. RT will continue to follow.    Tamy Quarles, RT

## 2024-03-07 NOTE — PROGRESS NOTES
"CLINICAL NUTRITION SERVICES - BRIEF NOTE     Nutrition Prescription    RECOMMENDATIONS FOR MDs/PROVIDERS TO ORDER:  Continue to replace electrolytes as needed    Malnutrition Status:    Patient does not meet two of the criteria necessary for diagnosing malnutrition    Recommendations already ordered by Registered Dietitian (RD):  Continue current TF regimen     Future/Additional Recommendations:  Will monitor TF, weight, labs, BM, I/Os.      EVALUATION OF THE PROGRESS TOWARD GOALS   Diet: NPO  Nutrition Support: TF   Jevity 1.5 (hospital equivalent to [facility] 1.) at 100 mL/hr x 10 hrs 8 pm-6 am   Water flushes 300 mL 4x/day    Provides: 1500 kcals, 64 gm protein, 216 gm CHO, 21 gm fiber, 760 mL free water (1960 mL with flushes) in 1000 mL formula     NEW FINDINGS   Pt has a G-J tube. Pt has been on strict NPO for many years per chart review with home TF. Pt is nonverbal. Per nsg pt tolerating TF overnight.     BM: x3 3/6   GI: G-J tube LUQ placed 1/31/24     Labs: K 3.2(L), BUN 26.8(H), Creat 0.34(L), - on K replacement protocol   Meds: Scheduled rocephin, lovenox, folic acid, keppra, levothyroxine, protonix, miralax, Kcl     ANTHROPOMETRICS  Height: 162.6 cm (5' 4\") questions accuracy, other RD notes show 5'1\", 4'10\"  Most Recent Weight: 42.2 kg (93 lb)    IBW: 47.7 kg (based on height of 5'1\")  BMI: Underweight BMI <18.5  Weight History: No clinically significant wt loss   Wt Readings from Last 10 Encounters:   03/06/24 46.2 kg (101 lb 13.6 oz)   10/26/23 42.2 kg (93 lb)   05/24/23 44 kg (97 lb)   04/27/22 48.1 kg (106 lb)   04/15/21 57.6 kg (127 lb)   12/05/19 52.6 kg (116 lb)   11/21/19 53 kg (116 lb 14.4 oz)   09/17/19 53.5 kg (118 lb)   08/27/19 53.5 kg (118 lb)      Dosing Weight: 42.2 kg     ASSESSED NUTRITION NEEDS  Estimated Energy Needs: 9456-2166 kcals/day (30 - 35 kcals/kg )  Justification: Underweight  Estimated Protein Needs: 51-63 grams protein/day (1.2 - 1.5 grams of pro/kg)  Justification: " Increased needs  Estimated Fluid Needs: 3826-1261 mL/day (1 mL/kcal)   Justification: Increased needs    MALNUTRITION  % Weight Loss:  Weight loss does not meet criteria for malnutrition, wt loss over the last 3-4 years   % Intake:  Decreased intake does not meet criteria for malnutrition, home TF  Subcutaneous Fat Loss:  None observed  Muscle Loss:  Acromion bone region mild depletion, unclear if baseline   Fluid Retention:  None noted    Malnutrition Diagnosis: Patient does not meet two of the above criteria necessary for diagnosing malnutrition    INTERVENTIONS  Implementation  Continue current TF regimen     Monitoring/Evaluation  Progress toward goals will be monitored and evaluated per protocol.

## 2024-03-07 NOTE — CONSULTS
Consultation - INFECTIOUS DISEASE CONSULTATION  Elysia Arellano ,  1965, MRN [id number]      History of cerebral palsy [Z86.69]  Pneumonia due to infectious organism, unspecified laterality, unspecified part of lung [J18.9]  Sepsis, due to unspecified organism, unspecified whether acute organ dysfunction present (H) [A41.9]  Stercoral colitis [K52.89]    PCP: Fabricio Aguirre, [phone]   Code status:  No CPR- Do NOT Intubate               Assessment:  Recurrent aspiration PNA: NPO at baseline. CT with debris in airway. Admitted fevers, leukocytosis, required supplemental O2. Now on room air, leukocytosis resolved and fevers improved. Seen by pulm-vest therapy. Clinically improving without covering MRSA-screening nares for MRSA has good negative predictive value with PNA but less so when positive.   Kleb and e faecalis in UC: contaminated with epithelial cells and unable to determine whether having symptoms so difficult to interpret.   Rash with vanc unclear if infusion related?  MRSA colonization  Cerebral palsy, Dysphagia, seizure disorder: G tube    Principal Problem:    Pneumonia due to infectious organism, unspecified laterality, unspecified part of lung  Active Problems:    Intellectual disability    Idiopathic generalized epilepsy, intractable (H)    Gastroesophageal reflux disease without esophagitis    Gastrostomy present (H)    Neurogenic bladder    History of cerebral palsy    Sepsis, due to unspecified organism, unspecified whether acute organ dysfunction present (H)    Stercoral colitis      Recommendations:   - ceftri while in the hospital  - cefuroxime 500mg PO BID through 3/11  - if unable to do cefuroxime via PEG then can substitute cefdinir  - ID will sign off. Please call with additional questions or change in clinical status.     Michelle Gómez MD  [facility]  [phone]       HPI:    Elysia Arellano is a 58 year old female. History is provided by chart,  staff.  Presented on 3/5 with respiratory failure, sepsis thought to be 2/2 aspiration PNA. CT with endobronchial debris. Given ceftri and flagyl. Improvement, on room air.   ID consulted for     Aspiration pneumonia, positive MRSA screening. Vanco allergy.     Today, no distress. Not much cough or secretions per staff.     Chief complaint: Principal Problem:    Pneumonia due to infectious organism, unspecified laterality, unspecified part of lung  Active Problems:    Intellectual disability    Idiopathic generalized epilepsy, intractable (H)    Gastroesophageal reflux disease without esophagitis    Gastrostomy present (H)    Neurogenic bladder    History of cerebral palsy    Sepsis, due to unspecified organism, unspecified whether acute organ dysfunction present (H)    Stercoral colitis      Medical History  Active Ambulatory Problems     Diagnosis Date Noted    Scoliosis 03/17/2021    Intellectual disability 04/01/2016    Cerebral palsy (H) 04/01/2016    Idiopathic generalized epilepsy, intractable (H) 03/17/2021    Gastroesophageal reflux disease without esophagitis 04/01/2016    History of Nissen fundoplication 07/04/2019    Osteoporosis 04/01/2016    Absence of toe of right foot (H24) 04/27/2022    Gastrostomy present (H) 02/16/2022    Urinary incontinence 03/05/2024    Retention of urine 03/05/2024    CP (cerebral palsy), spastic, quadriplegic (H) 03/05/2024    Constipation 03/05/2024    At high risk for falls 03/05/2024     Resolved Ambulatory Problems     Diagnosis Date Noted    Pneumonia due to 2019 novel coronavirus 08/07/2020     Past Medical History:   Diagnosis Date    Depression     GERD (gastroesophageal reflux disease)     Hyperopia     Mental retardation     Pyelonephritis     Seizure disorder (H)     UTI (urinary tract infection)          Surgical History  She  has a past surgical history that includes IR Gastro Jejunostomy Tube Placement; Scoliosis S/P Lewis Valentín Placement; IR Miscellaneous  Procedure (6/19/2000); IR Abscess Tube Change (6/27/2000); IR Abscess Tube Change (9/5/2000); IR Abscess Tube Change (3/15/2001); IR Abscess Tube Change (7/6/2001); IR Abscess Tube Change (9/4/2001); IR Miscellaneous Procedure (12/22/2001); IR Miscellaneous Procedure (1/7/2002); IR Miscellaneous Procedure (1/19/2002); IR Miscellaneous Procedure (1/19/2002); IR Miscellaneous Procedure (2/20/2002); IR Miscellaneous Procedure (2/20/2002); IR Miscellaneous Procedure (3/19/2002); IR Miscellaneous Procedure (4/16/2002); IR Miscellaneous Procedure (7/11/2002); IR Miscellaneous Procedure (7/11/2002); IR Gastrostomy Tube Change (7/18/2002); IR Miscellaneous Procedure (7/30/2002); IR Miscellaneous Procedure (8/16/2002); IR Miscellaneous Procedure (8/31/2002); IR Miscellaneous Procedure (9/18/2002); IR Miscellaneous Procedure (3/1/2003); IR Miscellaneous Procedure (8/5/2003); IR Miscellaneous Procedure (1/29/2004); IR Miscellaneous Procedure (3/18/2004); IR Miscellaneous Procedure (7/21/2004); IR Miscellaneous Procedure (11/15/2004); IR Miscellaneous Procedure (2/18/2005); IR Miscellaneous Procedure (4/8/2005); IR Miscellaneous Procedure (6/30/2005); IR Miscellaneous Procedure (9/30/2005); IR Miscellaneous Procedure (12/20/2005); IR Miscellaneous Procedure (3/28/2006); IR Miscellaneous Procedure (6/30/2006); IR Miscellaneous Procedure (10/30/2006); IR Miscellaneous Procedure (2/28/2007); IR Miscellaneous Procedure (6/19/2007); IR Miscellaneous Procedure (9/25/2007); IR Miscellaneous Procedure (12/10/2007); IR Miscellaneous Procedure (1/5/2008); IR Miscellaneous Procedure (6/18/2008); IR Miscellaneous Procedure (8/25/2008); IR Miscellaneous Procedure (12/18/2008); IR Miscellaneous Procedure (2/18/2009); IR Miscellaneous Procedure (8/4/2009); IR Gastro Jejunostomy Tube Change (8/30/2009); IR Miscellaneous Procedure (11/6/2009); IR Miscellaneous Procedure (12/28/2009); IR Miscellaneous Procedure (4/13/2010); IR Miscellaneous  Procedure (6/29/2010); IR Miscellaneous Procedure (10/11/2010); IR Miscellaneous Procedure (1/3/2011); IR Miscellaneous Procedure (2/25/2011); IR Miscellaneous Procedure (5/26/2011); IR Miscellaneous Procedure (8/23/2011); IR Miscellaneous Procedure (11/17/2011); IR Miscellaneous Procedure (2/15/2012); IR Miscellaneous Procedure (5/18/2012); IR Miscellaneous Procedure (8/16/2012); IR Miscellaneous Procedure (10/25/2012); IR Miscellaneous Procedure (1/14/2013); IR Miscellaneous Procedure (4/19/2013); IR Miscellaneous Procedure (7/24/2013); IR Miscellaneous Procedure (10/24/2013); IR Miscellaneous Procedure (12/23/2013); IR Miscellaneous Procedure (4/3/2014); IR Miscellaneous Procedure (5/20/2014); IR Gastro Jejunostomy Tube Change (8/5/2014); IR Gastro Jejunostomy Tube Change (11/5/2014); IR Gastro Jejunostomy Tube Change (2/18/2015); IR Gastro Jejunostomy Tube Change (5/18/2015); IR Gastro Jejunostomy Tube Change (8/17/2015); IR Gastro Jejunostomy Tube Change (10/28/2015); IR Gastro Jejunostomy Tube Change (1/16/2016); IR Gastro Jejunostomy Tube Change (4/14/2016); IR Gastro Jejunostomy Tube Change (5/13/2016); IR Gastro Jejunostomy Tube Change (6/16/2016); IR Gastro Jejunostomy Tube Change (7/20/2016); IR Gastro Jejunostomy Tube Change (9/7/2016); IR Gastro Jejunostomy Tube Change (10/24/2016); IR Gastro Jejunostomy Tube Change (1/8/2017); IR Gastro Jejunostomy Tube Change (3/19/2017); IR Gastro Jejunostomy Tube Change (4/25/2017); IR Gastro Jejunostomy Tube Change (6/10/2017); IR Gastro Jejunostomy Tube Change (9/11/2017); IR Gastro Jejunostomy Tube Change (12/11/2017); IR Gastro Jejunostomy Tube Change (2/12/2018); IR Gastro Jejunostomy Tube Change (5/16/2018); IR Gastro Jejunostomy Tube Change (8/27/2018); IR Gastro Jejunostomy Tube Change (11/13/2018); IR Gastro Jejunostomy Tube Change (1/8/2019); IR Gastro Jejunostomy Tube Change (2/6/2019); IR Gastro Jejunostomy Tube Change (4/29/2019); IR Gastro Jejunostomy  Tube Change (6/25/2019); IR Gastro Jejunostomy Tube Change (9/24/2019); IR Gastro Jejunostomy Tube Change (1/29/2020); IR Gastro Jejunostomy Tube Change (5/6/2020); IR Gastro Jejunostomy Tube Change (8/3/2020); IR Gastro Jejunostomy Tube Change (11/10/2020); IR Gastro Jejunostomy Tube Change (2/11/2021); IR Gastro Jejunostomy Tube Change (5/17/2021); Spinal Fusion; Ir Gj Tube Replacement (11/13/2018); Ir Gj Tube Replacement (1/8/2019); Ir Gj Tube Replacement (2/6/2019); Ir Gj Tube Replacement (4/29/2019); Ir Gj Tube Replacement (6/25/2019); Ir Gj Tube Replacement (9/24/2019); back surgery; Amputate toe(s) (Right, 11/21/2019); Ir Gj Tube Replacement (1/29/2020); Ir Gj Tube Replacement (5/6/2020); Ir Gj Tube Replacement (8/3/2020); Ir Gj Tube Replacement (11/10/2020); Ir Gj Tube Replacement (2/11/2021); Ir Gj Tube Replacement (5/17/2021); IR Gastro Jejunostomy Tube Change (8/16/2021); IR Gastro Jejunostomy Tube Change (11/19/2021); IR Gastro Jejunostomy Tube Change (2/22/2022); IR Gastro Jejunostomy Tube Change (7/22/2022); IR Gastro Jejunostomy Tube Change (8/12/2022); IR Gastro Jejunostomy Tube Change (11/17/2022); IR Gastro Jejunostomy Tube Change (2/14/2023); IR Gastro Jejunostomy Tube Change (4/19/2023); IR Gastro Jejunostomy Tube Change (6/9/2023); IR Gastro Jejunostomy Tube Change (6/8/2023); IR Gastro Jejunostomy Tube Change (8/25/2023); IR Gastro Jejunostomy Tube Change (10/26/2023); IR Gastro Jejunostomy Tube Change (12/4/2023); and IR Gastro Jejunostomy Tube Change (1/31/2024).       Social History  Reviewed, and she  reports that she has never smoked. She has never used smokeless tobacco. She reports that she does not drink alcohol and does not use drugs.        Family History  Reviewed and noncontributory to present problem    Psychosocial Needs  Social History     Social History Narrative    Not on file     Additional psychosocial needs reviewed per nursing assessment.       Allergies   Allergen  Reactions    Vancomycin Hives, Itching and Rash    Ciprofloxacin Rash     Other reaction(s): *Unknown    Aspirin Buf(Cacarb-Mgcarb-Mgo)      Other reaction(s): Unknown    Lanolin      Other reaction(s): *Unknown    Nsaids      Other reaction(s): Unknown    Amoxicillin-Pot Clavulanate Rash     10/26/23 given [facility] at [facility] ED    Ibuprofen Rash     Other reaction(s): *Unknown    Salicylates Rash     Other reaction(s): Unknown      Medications Prior to Admission   Medication Sig Dispense Refill Last Dose    acetaminophen (TYLENOL) 325 MG tablet Take 650 mg by mouth every 4 hours as needed for mild pain As needed for pain, discomfort, and fever.  Not to exceed 3000 mg in 24 hours.   Unknown at prn    arginine (ARGINAID) PACK Take 1 packet by mouth 2 times daily Per G-tube mixed with 4 ounces of water   3/5/2024 at am    bisacodyl (DULCOLAX) 10 MG suppository Place 10 mg rectally every other day   3/4/2024 at pm    calcium carbonate (OS-NATHALIA) 500 MG TABS 1 tablet by Per Feeding Tube route 2 times daily crushed   3/5/2024 at am    carboxymethylcellulose PF (REFRESH PLUS) 0.5 % SOLN ophthalmic solution 1 drop At Bedtime   3/4/2024 at pm    desonide (DESOWEN) 0.05 % external cream Apply topically 2 times daily .  Hold if no red, itchy, scaly areas on face.   3/5/2024 at am    Dextromethorphan-guaiFENesin  MG/5ML syrup Take 10 mLs by mouth every 6 hours as needed for cough   Unknown at prn    diazepam (VALIUM) 5 MG/ML (HIGH CONC) solution Take 5 mg by mouth every 8 hours as needed for anxiety   Past Week    diphenhydrAMINE (BENADRYL) 12.5 MG/5ML solution Take 25 mg by mouth every 4 hours as needed for sleep    Unknown at prn    folic acid (FOLVITE) 1 MG tablet Take 1 mg by mouth daily Per G- tube   3/5/2024 at am    lamoTRIgine (LAMICTAL) 200 MG TBDP ODT tab DISSOLVE 1 TABLET IN 5-10ML OF WATER AND TAKE PER G-TUBE TWICE DAILY ALONG WITH 75MG FOR TOTAL DOSE 275MG 186 tablet 3 3/5/2024 at am    lamoTRIgine  (LAMICTAL) 25 MG chewable tablet 3 tablets (75 mg) by Per G Tube route 2 times daily 540 tablet 3 3/5/2024 at am    levETIRAcetam (KEPPRA) 100 MG/ML oral solution GIVE 10ML (1000MG) PER G-TUBE TWICE DAILY 600 mL 11 3/5/2024 at am    levothyroxine (SYNTHROID/LEVOTHROID) 50 MCG tablet Take 50 mcg by mouth daily Per G-tube   3/4/2024 at pm    multivitamin w/minerals (THERA-VIT-M) tablet Take 1 tablet by mouth daily Per G-tube   3/5/2024 at am    nystatin (MYCOSTATIN) 937583 UNIT/GM external cream Apply topically 3 times daily as needed for other (TID To groin, and two times daily to G-tube site) 60 g 0 3/5/2024 at am    omeprazole (PRILOSEC) 40 MG DR capsule 20 mg daily Per g tube   3/5/2024 at am    protein modular, BENEPROTEIN, PACK 7 g by Per Feeding Tube route 2 times daily   3/5/2024 at am    Psyllium (GENFIBER PO) Take 2 Tablespoonful by mouth daily Per G-tube with 4 ounces of water flush   3/5/2024 at am    Sodium Phosphates (FLEET ENEMA RE) Place rectally every other day If no results from suppository   3/4/2024 at pm    SOLUBLE FIBER THERAPY powder Mix one tablespoonful in liquid and give per G-tube once daily   3/4/2024 at pm    topiramate (TOPAMAX) 200 MG tablet TAKE 1 TABLET PER G-TUBE TWICE DAILY 186 tablet 3 3/5/2024 at am    VITAMIN D (CHOLECALCIFEROL) PO Take 400 Units by mouth daily 2.5ml qd   3/5/2024 at am    Water For Injection Sterile (STERILE WATER, PRESERVATIVE FREE,) injection 4 times daily 300cc per j- tube   3/5/2024 at am        Review of Systems:  Nonverbal  Physical Exam:  Temp:  [97.5  F (36.4  C)-98.4  F (36.9  C)] 98.2  F (36.8  C)  Pulse:  [90-96] 96  Resp:  [16-20] 18  BP: ()/(47-64) 103/57  SpO2:  [95 %-100 %] 97 %    GEN: alert NAD  HEAD: atraumatic  ENT: moist membranes, no thrush, anicteric sclera   NECK: supple, no nuchal rigidity  CARDIOVASCULAR: regular rate and rhythm, no murmurs, rubs, or gallops  PULMONARY: lungs clear to ausculation bilaterally. Room air.   ABDOMEN:  soft, nontender, nondistended. Normal bowel sounds. PEG  SKIN: no rashes or lesions. No stigma of endocarditis  PSYCH: non verbal  MUSCULOSKELETAL: no synovitis               Pertinent Labs  personally reviewed.   CBC RESULTS:   Recent Labs   Lab Test 03/07/24  0546   WBC 10.1   RBC 4.01   HGB 12.1   HCT 39.7   MCV 99   MCH 30.2   MCHC 30.5*   RDW 14.6           Last Comprehensive Metabolic Panel:  Sodium   Date Value Ref Range Status   03/07/2024 139 135 - 145 mmol/L Final     Comment:     Reference intervals for this test were updated on 09/26/2023 to more accurately reflect our healthy population. There may be differences in the flagging of prior results with similar values performed with this method. Interpretation of those prior results can be made in the context of the updated reference intervals.      Potassium   Date Value Ref Range Status   03/07/2024 3.2 (L) 3.4 - 5.3 mmol/L Final   04/29/2022 4.0 3.5 - 5.0 mmol/L Final     Chloride   Date Value Ref Range Status   03/07/2024 108 (H) 98 - 107 mmol/L Final   04/29/2022 104 98 - 107 mmol/L Final     Carbon Dioxide (CO2)   Date Value Ref Range Status   03/07/2024 19 (L) 22 - 29 mmol/L Final   04/29/2022 28 22 - 31 mmol/L Final     Anion Gap   Date Value Ref Range Status   03/07/2024 12 7 - 15 mmol/L Final   04/29/2022 10 5 - 18 mmol/L Final     Glucose   Date Value Ref Range Status   03/07/2024 116 (H) 70 - 99 mg/dL Final   04/29/2022 94 70 - 125 mg/dL Final     Urea Nitrogen   Date Value Ref Range Status   03/07/2024 26.8 (H) 6.0 - 20.0 mg/dL Final   04/29/2022 27 (H) 8 - 22 mg/dL Final     Creatinine   Date Value Ref Range Status   03/07/2024 0.34 (L) 0.51 - 0.95 mg/dL Final     GFR Estimate   Date Value Ref Range Status   03/07/2024 >90 >60 mL/min/1.73m2 Final   04/22/2021 >60 >60 mL/min/1.73m2 Final   04/22/2021 >60 >60 mL/min/1.73m2 Final     Calcium   Date Value Ref Range Status   03/07/2024 8.0 (L) 8.6 - 10.0 mg/dL Final       CRP   Date Value  "Ref Range Status   08/11/2020 1.4 (H) 0.0 - 0.8 mg/dL Final        The following microbiology studies were personally reviewed:  No results found for: \"CULT\"    Urine Studies    Recent Labs   Lab Test 03/05/24  2029 10/26/23  1458 03/10/20  1444 11/19/19  1210 11/18/19  1846   LEUKEST 500 Jose D/uL* 75 Jose D/uL* Moderate* Large* Moderate*   WBCU 54* 99* 25-50* * 5-10*       Vancomycin Levels  No lab results found.    Invalid input(s): \"VANCO\"    MICROBIOLOGY DATA:    All cultures:  7-Day Micro Results       Collected Updated Procedure Result Status      03/06/2024 2149 03/07/2024 0947 MRSA MSSA PCR, Nasal Swab [[id number]]    (Abnormal)   Swab from Nose    Final result Component Value   MRSA Target DNA Positive   SA Target DNA Positive            03/06/2024 2149 03/07/2024 0947 MRSA Culture Reflex [[id number]]   Swab from Nose    In process Component Value   No component results               03/05/2024 2029 03/07/2024 1135 Urine Culture [[id number]]    (Abnormal)   Urine, Midstream    Preliminary result Component Value   Culture >100,000 CFU/mL Klebsiella pneumoniae  [P]     50,000-100,000 CFU/mL Enterococcus faecalis  [P]         Susceptibility        Klebsiella pneumoniae      ESTEFANI      Amikacin <=2 ug/mL Susceptible  [*]       Ampicillin  Resistant  [1]       Ampicillin/ Sulbactam 4 ug/mL Susceptible      Cefazolin <=4 ug/mL Susceptible  [2]       Cefepime <=1 ug/mL Susceptible      Cefoxitin <=4 ug/mL Susceptible      Ceftazidime <=1 ug/mL Susceptible      Ceftriaxone <=1 ug/mL Susceptible      Ciprofloxacin <=0.25 ug/mL Susceptible      Extended Spectrum Beta-Lactamase Negative ug/mL ESBL Negative  [*]       Gentamicin <=1 ug/mL Susceptible      Levofloxacin <=0.12 ug/mL Susceptible      Meropenem <=0.25 ug/mL Susceptible  [*]       Nitrofurantoin 64 ug/mL Intermediate      Piperacillin/Tazobactam <=4 ug/mL Susceptible      Tobramycin <=1 ug/mL Susceptible      Trimethoprim/Sulfamethoxazole <=1/19 ug/mL " Susceptible                   [*]  Suppressed Antibiotic     [1]  Intrinsically Resistant     [2]  Cefazolin ESTEFANI breakpoints are for the treatment of uncomplicated urinary tract infections. For the treatment of systemic infections, please contact the laboratory for additional testing.                    03/05/2024 1906 03/05/2024 1947 Symptomatic Influenza A/B, RSV, & SARS-CoV2 PCR (COVID-19) Nasopharyngeal [[id number]]    Swab from Nasopharyngeal    Final result Component Value   Influenza A PCR Negative   Influenza B PCR Negative   RSV PCR Negative   SARS CoV2 PCR Negative   NEGATIVE: SARS-CoV-2 (COVID-19) RNA not detected, presumed negative.            03/05/2024 1414 03/06/2024 1746 Blood Culture Peripheral Blood [[id number]]   Peripheral Blood    Preliminary result Component Value   Culture No growth after 1 day  [P]                03/05/2024 1323 03/06/2024 1746 Blood Culture Peripheral Blood [[id number]]   Peripheral Blood    Preliminary result Component Value   Culture No growth after 1 day  [P]                         Pertinent Radiology  personally reviewed.     CT Chest/Abdomen/Pelvis w Contrast    Result Date: 3/5/2024  EXAM: CT CHEST/ABDOMEN/PELVIS W CONTRAST LOCATION: [facility] DATE: 3/5/2024 INDICATION: cough, sepsis, nonverbal baseline COMPARISON: 3/1/2017 TECHNIQUE: CT scan of the chest, abdomen, and pelvis was performed following injection of IV contrast. Multiplanar reformats were obtained. Dose reduction techniques were used. CONTRAST: isovue 370  50ml FINDINGS: LUNGS AND PLEURA: Extensive endobronchial debris throughout the left main bronchus and its branches with associated consolidation in the lingula and left lower lobe. The right lung is clear, although evaluation is somewhat limited by respiratory motion. MEDIASTINUM/AXILLAE: No lymphadenopathy. No thoracic aortic aneurysms. Aberrant retroesophageal course of the right subclavian artery. CORONARY ARTERY  CALCIFICATION: None. HEPATOBILIARY: Normal. PANCREAS: Normal. SPLEEN: Normal. ADRENAL GLANDS: Normal. KIDNEYS/BLADDER: Normal. BOWEL: Percutaneous gastrojejunostomy tube with balloon in the stomach and tip in the proximal jejunum. Large amount of retained stool throughout the colon, including a large stool ball in the distal sigmoid colon and rectum. Mild rectal wall thickening,  but no ulceration or extraluminal gas. LYMPH NODES: Normal. VASCULATURE: Unremarkable. PELVIC ORGANS: Normal. MUSCULOSKELETAL: Fusion rods throughout the spine with dextroconvex curvature.     IMPRESSION: 1.  Extensive endobronchial debris throughout the left main bronchus and left lung airways concerning for mucous plugging and/or aspiration. There is associated consolidation in the lingula and left lower lobe. 2.  Large amount of stool in the distal sigmoid colon and rectum suggesting stercoral colitis.

## 2024-03-08 LAB
ANION GAP SERPL CALCULATED.3IONS-SCNC: 9 MMOL/L (ref 7–15)
BACTERIA UR CULT: ABNORMAL
BACTERIA UR CULT: ABNORMAL
BUN SERPL-MCNC: 17.2 MG/DL (ref 6–20)
CALCIUM SERPL-MCNC: 8.3 MG/DL (ref 8.6–10)
CHLORIDE SERPL-SCNC: 110 MMOL/L (ref 98–107)
CREAT SERPL-MCNC: 0.32 MG/DL (ref 0.51–0.95)
CREAT SERPL-MCNC: 0.33 MG/DL (ref 0.51–0.95)
DEPRECATED HCO3 PLAS-SCNC: 21 MMOL/L (ref 22–29)
EGFRCR SERPLBLD CKD-EPI 2021: >90 ML/MIN/1.73M2
EGFRCR SERPLBLD CKD-EPI 2021: >90 ML/MIN/1.73M2
ERYTHROCYTE [DISTWIDTH] IN BLOOD BY AUTOMATED COUNT: 14.5 % (ref 10–15)
GLUCOSE SERPL-MCNC: 119 MG/DL (ref 70–99)
HCT VFR BLD AUTO: 39 % (ref 35–47)
HGB BLD-MCNC: 11.9 G/DL (ref 11.7–15.7)
MCH RBC QN AUTO: 30 PG (ref 26.5–33)
MCHC RBC AUTO-ENTMCNC: 30.5 G/DL (ref 31.5–36.5)
MCV RBC AUTO: 98 FL (ref 78–100)
PLATELET # BLD AUTO: 212 10E3/UL (ref 150–450)
POTASSIUM SERPL-SCNC: 3.7 MMOL/L (ref 3.4–5.3)
RBC # BLD AUTO: 3.97 10E6/UL (ref 3.8–5.2)
SODIUM SERPL-SCNC: 140 MMOL/L (ref 135–145)
WBC # BLD AUTO: 6.4 10E3/UL (ref 4–11)

## 2024-03-08 PROCEDURE — 250N000013 HC RX MED GY IP 250 OP 250 PS 637: Performed by: INTERNAL MEDICINE

## 2024-03-08 PROCEDURE — 120N000001 HC R&B MED SURG/OB

## 2024-03-08 PROCEDURE — 36415 COLL VENOUS BLD VENIPUNCTURE: CPT | Performed by: INTERNAL MEDICINE

## 2024-03-08 PROCEDURE — 85014 HEMATOCRIT: CPT | Performed by: INTERNAL MEDICINE

## 2024-03-08 PROCEDURE — 272N000098

## 2024-03-08 PROCEDURE — 94667 MNPJ CHEST WALL 1ST: CPT

## 2024-03-08 PROCEDURE — 250N000011 HC RX IP 250 OP 636: Performed by: INTERNAL MEDICINE

## 2024-03-08 PROCEDURE — 999N000157 HC STATISTIC RCP TIME EA 10 MIN

## 2024-03-08 PROCEDURE — 99232 SBSQ HOSP IP/OBS MODERATE 35: CPT | Performed by: INTERNAL MEDICINE

## 2024-03-08 PROCEDURE — 80048 BASIC METABOLIC PNL TOTAL CA: CPT | Performed by: INTERNAL MEDICINE

## 2024-03-08 RX ORDER — CYCLOBENZAPRINE HCL 5 MG
5 TABLET ORAL 3 TIMES DAILY PRN
Status: DISCONTINUED | OUTPATIENT
Start: 2024-03-08 | End: 2024-03-08

## 2024-03-08 RX ADMIN — NYSTATIN: 100000 CREAM TOPICAL at 21:12

## 2024-03-08 RX ADMIN — NYSTATIN: 100000 CREAM TOPICAL at 09:07

## 2024-03-08 RX ADMIN — LEVETIRACETAM 1000 MG: 100 SOLUTION ORAL at 08:35

## 2024-03-08 RX ADMIN — Medication 40 MG: at 08:35

## 2024-03-08 RX ADMIN — LEVOTHYROXINE SODIUM 50 MCG: 0.03 TABLET ORAL at 21:12

## 2024-03-08 RX ADMIN — TOPIRAMATE 200 MG: 100 TABLET, FILM COATED ORAL at 08:34

## 2024-03-08 RX ADMIN — Medication 1 DROP: at 21:13

## 2024-03-08 RX ADMIN — TOPIRAMATE 200 MG: 100 TABLET, FILM COATED ORAL at 21:13

## 2024-03-08 RX ADMIN — DESONIDE: 0.5 CREAM TOPICAL at 21:12

## 2024-03-08 RX ADMIN — LAMOTRIGINE 75 MG: 25 TABLET ORAL at 08:35

## 2024-03-08 RX ADMIN — LEVETIRACETAM 1000 MG: 100 SOLUTION ORAL at 21:12

## 2024-03-08 RX ADMIN — LAMOTRIGINE 200 MG: 100 TABLET ORAL at 08:34

## 2024-03-08 RX ADMIN — POLYETHYLENE GLYCOL 3350 17 G: 17 POWDER, FOR SOLUTION ORAL at 21:10

## 2024-03-08 RX ADMIN — DESONIDE: 0.5 CREAM TOPICAL at 12:20

## 2024-03-08 RX ADMIN — CEFTRIAXONE SODIUM 1 G: 1 INJECTION, POWDER, FOR SOLUTION INTRAMUSCULAR; INTRAVENOUS at 21:10

## 2024-03-08 RX ADMIN — FOLIC ACID 1 MG: 1 TABLET ORAL at 08:34

## 2024-03-08 RX ADMIN — LAMOTRIGINE 75 MG: 25 TABLET ORAL at 21:11

## 2024-03-08 RX ADMIN — LAMOTRIGINE 200 MG: 100 TABLET ORAL at 21:12

## 2024-03-08 RX ADMIN — ENOXAPARIN SODIUM 30 MG: 30 INJECTION SUBCUTANEOUS at 18:38

## 2024-03-08 ASSESSMENT — ACTIVITIES OF DAILY LIVING (ADL)
ADLS_ACUITY_SCORE: 71
ADLS_ACUITY_SCORE: 67
ADLS_ACUITY_SCORE: 67
ADLS_ACUITY_SCORE: 71
ADLS_ACUITY_SCORE: 67
ADLS_ACUITY_SCORE: 71
ADLS_ACUITY_SCORE: 67
ADLS_ACUITY_SCORE: 67
ADLS_ACUITY_SCORE: 71
ADLS_ACUITY_SCORE: 67
ADLS_ACUITY_SCORE: 71
ADLS_ACUITY_SCORE: 71
ADLS_ACUITY_SCORE: 67

## 2024-03-08 NOTE — PLAN OF CARE
Problem: Adult Inpatient Plan of Care  Goal: Optimal Comfort and Wellbeing  Outcome: Progressing   Goal Outcome Evaluation:  No new events overnight.  Pt slept the whole night even through cares.  No s/s pain.  Maintaining sats in the 90's on room air.  Incontinent of stool and urine overnight.  Turned and repositioned for comfort.  Tube feeding off at 0645 due to starting late.

## 2024-03-08 NOTE — PROGRESS NOTES
[facility]    Medicine Progress Note - Hospitalist Service    Date of Admission:  3/5/2024    Assessment & Plan   Elysia Arellano is a 58 year old female with history of cerebral palsy, developmental delay, nonverbal, neurogenic bowel and bladder, seizure disorder, hypothyroidism and chronic dysphagia admitted on 3/5/2024 with acute hypoxic respiratory failure and sepsis likely secondary to aspiration pneumonia.        Sepsis  Acute hypoxemic respiratory failure  -- Suspect secondary to aspiration pneumonia. CT on admission shows extensive endobronchial debris left main bronchus and left lung concerning for mucous plugging and/or aspiration with associated consolidation   -- Received IV cefepime given PCN allergy however there is risk of neurotoxicity and in setting of seizure disorder, changed to IV ceftriaxone and flagyl to cover aspiration PNA  -- Follow-up blood culture results  -- UA- pyuria noted. Above antibitoics should be sufficient  -- Continue oxygen as needed   -- Appreciate pulm consult. Discussed with Dr Doran. Unable to do flutter valve. Vest therapy started. If oxygenation doesnt improve, can re-consider bronchoscopy per pulm     Vancomycin allergy: initially started on IV vanco as part of sepsis protocol however developed rash  -- Stop vanco  -- Allergy added  -- PRN benadryl given  -- Monitor  -- MRSA swab- positive. Appreciate ID consult. As the patient is making clinical progress without MRSA coverage, there is no need for MRSA coverage per ID. It could be just a MRSA colonization     Imaging evidence of possible stercoral colitis  Known history of neurogenic bowel related to cerebral palsy  -- MiraLAX twice daily, senna as needed, escalate bowel regimen as needed     Known history of oropharyngeal dysphagia  -- Has G-tube in place and is on chronic tube feeds  -- Strict n.p.o. Patient has been strict NPO for many years  -- Nutrition consult to resume tube feeds    "  Mild hypernatremia:  -- Continue water flushes per home routine  -- Resoved. Hypotonic IVF stopped.     Hypokalemia:  -- Replacement protocol  -- Placed on potassium containing IV fluids     Mild hypercalcemia: likely related to dehydration and home scheduled Oscal  -- Hold home Oscal  -- Trend for improvement with IVF     Mild troponin elevation consistent with demand ischemia  -- Recheck troponin only 30 compared to 29 on admission, EKG with sinus tachycardia  -- Stop trending troponin  -- Treat sepsis as above     Seizure disorder: Continue home Lamictal and Keppra     Hypothyroidism: Continue home replacement, TSH acceptable     GERD: Continue home PPI             Diet: NPO for Medical/Clinical Reasons Except for: NPO but receiving Tube Feeding  Adult Formula Drip Feeding: Continuous Jevity 1.5; Jejunostomy; Goal Rate: 100; mL/hr; From: 8:00 PM; To: 6:00 AM; Provide Synthroid dose 1 hr before starting TF    DVT Prophylaxis: Enoxaparin (Lovenox) SQ  Araujo Catheter: Not present  Lines: None     Cardiac Monitoring: None  Code Status: No CPR- Do NOT Intubate      Clinically Significant Risk Factors        # Hypokalemia: Lowest K = 3.2 mmol/L in last 2 days, will replace as needed                  # Cachexia: Estimated body mass index is 17.1 kg/m  as calculated from the following:    Height as of this encounter: 1.626 m (5' 4\").    Weight as of this encounter: 45.2 kg (99 lb 10.4 oz)., PRESENT ON ADMISSION            Disposition Plan      Expected Discharge Date: 03/12/2024    Discharge Delays: IV Medication - consider oral or Home Infusion  Destination: group home              MALIA Kaur  Hospitalist Service  [facility]  Securely message with [employer] (more info)  Text page via [facility] Paging/Directory   ______________________________________________________________________    Interval History   No new issues overnight. Appears to be doing better.     Physical Exam   Vital Signs: " Temp: 98.6  F (37  C) Temp src: Axillary BP: (!) 140/83 Pulse: 108   Resp: 18 SpO2: 97 % O2 Device: None (Room air)    Weight: 99 lbs 10.37 oz      General: Not in obvious distress.  HEENT: NC, AT   Chest: Coarse breath sounds, mostly on the left side  Heart: S1S2 normal, regular. No M/R/G  Abdomen: Soft. NT, ND. Bowel sounds- active. GJ in place  Extremities: No legs swelling  Neuro: Non-verbal. Opens eyes. Doesn't follow commands. Extremites are contracted. Extremity muscle wasting due to non-use noted.       Medical Decision Making             Data     I have personally reviewed the following data over the past 24 hrs:    6.4  \   11.9   / 212     140 110 (H) 17.2 /  119 (H)   3.7 21 (L) 0.32 (L) \       Imaging results reviewed over the past 24 hrs:   No results found for this or any previous visit (from the past 24 hour(s)).

## 2024-03-08 NOTE — PLAN OF CARE
Problem: Adult Inpatient Plan of Care  Goal: Plan of Care Review  Description: The Plan of Care Review/Shift note should be completed every shift.  The Outcome Evaluation is a brief statement about your assessment that the patient is improving, declining, or no change.  This information will be displayed automatically on your shift  note.  Outcome: Progressing  Goal: Absence of Hospital-Acquired Illness or Injury  Intervention: Identify and Manage Fall Risk  Recent Flowsheet Documentation  Taken 3/7/2024 1723 by Tanvi Gamino, RN  Safety Promotion/Fall Prevention:   lighting adjusted   room organization consistent     Problem: Gas Exchange Impaired  Goal: Optimal Gas Exchange  Outcome: Progressing     Problem: Comorbidity Management  Goal: Maintenance of Seizure Control  Outcome: Progressing  Intervention: Maintain Seizure Symptom Control  Recent Flowsheet Documentation  Taken 3/7/2024 1723 by Tanvi Gamino, RN  Medication Review/Management: medications reviewed     Problem: Pneumonia  Goal: Resolution of Infection Signs and Symptoms  Outcome: Progressing  Goal: Effective Oxygenation and Ventilation  Intervention: Promote Airway Secretion Clearance  Recent Flowsheet Documentation  Taken 3/7/2024 1723 by Tanvi Gamino, RN  Cough And Deep Breathing: unable to perform     Goal Outcome Evaluation:  Patient alert and non-verbal. No non-verbal expressions of pain. Vitals are stable and on room air. GJ tube in place, free water flushes given per order. Patient remains NPO, tube feed started at 100 mL/hr at 2100. Patient incontinent of bowel and bladder. Turned and repositioned Q 2 hours.

## 2024-03-08 NOTE — PROGRESS NOTES
Care Management Follow Up    Length of Stay (days): 3    Expected Discharge Date: 03/12/2024     Concerns to be Addressed: discharge planning, medical progression     Patient plan of care discussed at interdisciplinary rounds: Yes    Anticipated Discharge Disposition:  return to group [address]      Anticipated Discharge Services:  none  Anticipated Discharge DME:  per treatment team     Patient/family educated on Medicare website which has current facility and service quality ratings:  Not applicable at this time   Education Provided on the Discharge Plan:  yes, per treatment team   Patient/Family in Agreement with the Plan:  yes     Referrals Placed by CM/SW:  none required at this time  Private pay costs discussed: Not applicable    Additional Information:  SW discussed updates with MD.  Patient is not medically ready for discharge at this time.  Anticipate patient will be hospitalized through weekend.  Currently on IV abx.     CM following care progression to assist with safe discharge planning.     Social History:  Assessment completed with patient's [facility]/guardian/mother Reshma. Pt is developmentally approximately 3 months of age per Reshma. Pt lives at [facility] head  RN [place]- [phone]. Per Reshma, pt has [facility] (no phone number on file) and staff provide all cares for pt. Pt to be transported via medical transport wheelchair when discharged per Reshma.      LEATHA Patel

## 2024-03-08 NOTE — PLAN OF CARE
Problem: Adult Inpatient Plan of Care  Goal: Absence of Hospital-Acquired Illness or Injury  Intervention: Identify and Manage Fall Risk  Recent Flowsheet Documentation  Taken 3/8/2024 1000 by Sebastián Cummings RN  Safety Promotion/Fall Prevention:   increase visualization of patient   room door open   room organization consistent   safety round/check completed  Intervention: Prevent Skin Injury  Recent Flowsheet Documentation  Taken 3/8/2024 1200 by Sebastián Cummings RN  Body Position: left  Taken 3/8/2024 1000 by Sebastián Cummings RN  Body Position: supine, head elevated  Device Skin Pressure Protection: tubing/devices free from skin contact  Taken 3/8/2024 0900 by Sebastián Cummings RN  Body Position: supine, head elevated  Goal: Optimal Comfort and Wellbeing  Intervention: Monitor Pain and Promote Comfort  Recent Flowsheet Documentation  Taken 3/8/2024 0900 by Sebastián Cummings RN  Pain Management Interventions: music therapy     Problem: Gas Exchange Impaired  Goal: Optimal Gas Exchange  Intervention: Optimize Oxygenation and Ventilation  Recent Flowsheet Documentation  Taken 3/8/2024 1200 by Sebastián Cummings RN  Head of Bed (HOB) Positioning: HOB at 20 degrees  Taken 3/8/2024 1000 by Sebastián Cummings RN  Head of Bed (HOB) Positioning: HOB at 20 degrees  Taken 3/8/2024 0900 by Sebastián Cummings RN  Head of Bed (HOB) Positioning: HOB at 20 degrees     Problem: Comorbidity Management  Goal: Maintenance of Seizure Control  Intervention: Maintain Seizure Symptom Control  Recent Flowsheet Documentation  Taken 3/8/2024 1000 by Sebastián Cummings RN  Sensory Stimulation Regulation: music on  Seizure Precautions: side rails padded  Medication Review/Management: medications reviewed     Problem: Enteral Nutrition  Goal: Absence of Aspiration Signs and Symptoms  Intervention: Minimize Aspiration Risk  Recent Flowsheet Documentation  Taken 3/8/2024 1200 by Sebastián Cummings RN  Head of Bed (HOB) Positioning: HOB at 20  degrees  Taken 3/8/2024 1000 by Sebastián Cummings RN  Head of Bed ([facility]) Positioning: HOB at 20 degrees  Taken 3/8/2024 0900 by Sebastián Cummings RN  Head of Bed ([facility]) Positioning: HOB at 20 degrees     Problem: Pneumonia  Goal: Fluid Balance  Intervention: Monitor and Manage Fluid Balance  Recent Flowsheet Documentation  Taken 3/8/2024 1000 by Sebastián Cummings RN  Fluid/Electrolyte Management: (Free water ordered 4x daily)   fluids provided   other (see comments)  Goal: Resolution of Infection Signs and Symptoms  Intervention: Prevent Infection Progression  Recent Flowsheet Documentation  Taken 3/8/2024 1000 by Sebastián Cummings RN  Infection Management: (Isolation precautions maintained) other (see comments)  Isolation Precautions: contact precautions maintained  Goal: Effective Oxygenation and Ventilation  Intervention: Optimize Oxygenation and Ventilation  Recent Flowsheet Documentation  Taken 3/8/2024 1200 by Sebastián Cummings RN  Head of Bed ([facility]) Positioning: HOB at 20 degrees  Taken 3/8/2024 1000 by Sebastián Cummings RN  Head of Bed ([facility]) Positioning: HOB at 20 degrees  Taken 3/8/2024 0900 by Sebastián Cummings RN  Head of Bed ([facility]) Positioning: HOB at 20 degrees   Goal Outcome Evaluation:       Patient mildly tachycardic other VSS. She is sleeping intermittently throughout shift. Unable to assess orientation due to cognitive disability/nonverbal status. Patient repositioned Q2H throughout shift. Incontinent loose BM this morning. Patient skin showing signs of irritation and scratches from patient. Hospitalist notified of scratching. No new orders.

## 2024-03-08 NOTE — PROGRESS NOTES
Patient not available for vest therapy this morning due to bowel movement/cares.    Tammy Ruiz, RT

## 2024-03-09 LAB
ANION GAP SERPL CALCULATED.3IONS-SCNC: 11 MMOL/L (ref 7–15)
BUN SERPL-MCNC: 13.5 MG/DL (ref 6–20)
CALCIUM SERPL-MCNC: 8.6 MG/DL (ref 8.6–10)
CHLORIDE SERPL-SCNC: 108 MMOL/L (ref 98–107)
CREAT SERPL-MCNC: 0.32 MG/DL (ref 0.51–0.95)
DEPRECATED HCO3 PLAS-SCNC: 20 MMOL/L (ref 22–29)
EGFRCR SERPLBLD CKD-EPI 2021: >90 ML/MIN/1.73M2
ERYTHROCYTE [DISTWIDTH] IN BLOOD BY AUTOMATED COUNT: 14.4 % (ref 10–15)
GLUCOSE SERPL-MCNC: 110 MG/DL (ref 70–99)
HCT VFR BLD AUTO: 39.2 % (ref 35–47)
HGB BLD-MCNC: 12.1 G/DL (ref 11.7–15.7)
MCH RBC QN AUTO: 30.1 PG (ref 26.5–33)
MCHC RBC AUTO-ENTMCNC: 30.9 G/DL (ref 31.5–36.5)
MCV RBC AUTO: 98 FL (ref 78–100)
PLATELET # BLD AUTO: 243 10E3/UL (ref 150–450)
POTASSIUM SERPL-SCNC: 3.7 MMOL/L (ref 3.4–5.3)
RBC # BLD AUTO: 4.02 10E6/UL (ref 3.8–5.2)
SODIUM SERPL-SCNC: 139 MMOL/L (ref 135–145)
WBC # BLD AUTO: 6.7 10E3/UL (ref 4–11)

## 2024-03-09 PROCEDURE — 250N000011 HC RX IP 250 OP 636: Performed by: INTERNAL MEDICINE

## 2024-03-09 PROCEDURE — 120N000001 HC R&B MED SURG/OB

## 2024-03-09 PROCEDURE — 999N000157 HC STATISTIC RCP TIME EA 10 MIN

## 2024-03-09 PROCEDURE — 99232 SBSQ HOSP IP/OBS MODERATE 35: CPT | Performed by: INTERNAL MEDICINE

## 2024-03-09 PROCEDURE — 94668 MNPJ CHEST WALL SBSQ: CPT

## 2024-03-09 PROCEDURE — 250N000013 HC RX MED GY IP 250 OP 250 PS 637: Performed by: INTERNAL MEDICINE

## 2024-03-09 PROCEDURE — 85027 COMPLETE CBC AUTOMATED: CPT | Performed by: INTERNAL MEDICINE

## 2024-03-09 PROCEDURE — 80048 BASIC METABOLIC PNL TOTAL CA: CPT | Performed by: INTERNAL MEDICINE

## 2024-03-09 PROCEDURE — 36415 COLL VENOUS BLD VENIPUNCTURE: CPT | Performed by: INTERNAL MEDICINE

## 2024-03-09 PROCEDURE — 94667 MNPJ CHEST WALL 1ST: CPT

## 2024-03-09 RX ADMIN — CEFTRIAXONE SODIUM 1 G: 1 INJECTION, POWDER, FOR SOLUTION INTRAMUSCULAR; INTRAVENOUS at 21:23

## 2024-03-09 RX ADMIN — TOPIRAMATE 200 MG: 100 TABLET, FILM COATED ORAL at 21:25

## 2024-03-09 RX ADMIN — LAMOTRIGINE 200 MG: 100 TABLET ORAL at 21:23

## 2024-03-09 RX ADMIN — NYSTATIN: 100000 CREAM TOPICAL at 08:41

## 2024-03-09 RX ADMIN — POLYETHYLENE GLYCOL 3350 17 G: 17 POWDER, FOR SOLUTION ORAL at 21:24

## 2024-03-09 RX ADMIN — DESONIDE: 0.5 CREAM TOPICAL at 08:41

## 2024-03-09 RX ADMIN — NYSTATIN: 100000 CREAM TOPICAL at 21:25

## 2024-03-09 RX ADMIN — LEVETIRACETAM 1000 MG: 100 SOLUTION ORAL at 08:38

## 2024-03-09 RX ADMIN — Medication 40 MG: at 08:38

## 2024-03-09 RX ADMIN — POLYETHYLENE GLYCOL 3350 17 G: 17 POWDER, FOR SOLUTION ORAL at 08:40

## 2024-03-09 RX ADMIN — LEVETIRACETAM 1000 MG: 100 SOLUTION ORAL at 21:22

## 2024-03-09 RX ADMIN — LAMOTRIGINE 75 MG: 25 TABLET ORAL at 21:24

## 2024-03-09 RX ADMIN — LAMOTRIGINE 200 MG: 100 TABLET ORAL at 08:38

## 2024-03-09 RX ADMIN — LEVOTHYROXINE SODIUM 50 MCG: 0.03 TABLET ORAL at 21:23

## 2024-03-09 RX ADMIN — TOPIRAMATE 200 MG: 100 TABLET, FILM COATED ORAL at 08:40

## 2024-03-09 RX ADMIN — Medication 1 DROP: at 21:26

## 2024-03-09 RX ADMIN — ENOXAPARIN SODIUM 30 MG: 30 INJECTION SUBCUTANEOUS at 18:23

## 2024-03-09 RX ADMIN — DESONIDE: 0.5 CREAM TOPICAL at 21:26

## 2024-03-09 RX ADMIN — FOLIC ACID 1 MG: 1 TABLET ORAL at 08:39

## 2024-03-09 RX ADMIN — LAMOTRIGINE 75 MG: 25 TABLET ORAL at 08:39

## 2024-03-09 ASSESSMENT — ACTIVITIES OF DAILY LIVING (ADL)
ADLS_ACUITY_SCORE: 71
ADLS_ACUITY_SCORE: 71
ADLS_ACUITY_SCORE: 67
ADLS_ACUITY_SCORE: 71
ADLS_ACUITY_SCORE: 71
ADLS_ACUITY_SCORE: 67
ADLS_ACUITY_SCORE: 71
ADLS_ACUITY_SCORE: 67
ADLS_ACUITY_SCORE: 71
ADLS_ACUITY_SCORE: 67
ADLS_ACUITY_SCORE: 71
ADLS_ACUITY_SCORE: 67

## 2024-03-09 NOTE — PLAN OF CARE
Problem: Adult Inpatient Plan of Care  Goal: Absence of Hospital-Acquired Illness or Injury  Intervention: Prevent Infection  Recent Flowsheet Documentation  Taken 3/9/2024 0830 by Macey Chambers RN  Infection Prevention:   personal protective equipment utilized   rest/sleep promoted   equipment surfaces disinfected     Problem: Adult Inpatient Plan of Care  Goal: Absence of Hospital-Acquired Illness or Injury  Intervention: Prevent Skin Injury  Recent Flowsheet Documentation  Taken 3/9/2024 0830 by Macey Chambers RN  Body Position:   turned   left  Device Skin Pressure Protection:   tubing/devices free from skin contact   absorbent pad utilized/changed     Problem: Pneumonia  Goal: Effective Oxygenation and Ventilation  Intervention: Optimize Oxygenation and Ventilation  Recent Flowsheet Documentation  Taken 3/9/2024 0830 by Macey Chambers RN  Head of Bed (HOB) Positioning: HOB at 30 degrees   Goal Outcome Evaluation:       Patient non-verbal, has a dry cough infrequently. Turned every 2 hours and as needed. GT site cleaned and is dry and intact dressing changed. Free water flush as ordered. Patient agitated with mouth care. Music on for patient. Incontinent of urine. Cream applied to reddened robbie area. Continue to monitor.

## 2024-03-09 NOTE — PLAN OF CARE
"  Problem: Adult Inpatient Plan of Care  Goal: Plan of Care Review  Description: The Plan of Care Review/Shift note should be completed every shift.  The Outcome Evaluation is a brief statement about your assessment that the patient is improving, declining, or no change.  This information will be displayed automatically on your shift  note.  Outcome: Progressing  Goal: Patient-Specific Goal (Individualized)  Description: You can add care plan individualizations to a care plan. Examples of Individualization might be:  \"Parent requests to be called daily at 9am for status\", \"I have a hard time hearing out of my right ear\", or \"Do not touch me to wake me up as it startles  me\".  Outcome: Progressing  Goal: Absence of Hospital-Acquired Illness or Injury  Outcome: Progressing  Intervention: Identify and Manage Fall Risk  Recent Flowsheet Documentation  Taken 3/9/2024 0024 by Gila Chand RN  Safety Promotion/Fall Prevention:   increased rounding and observation   increase visualization of patient  Intervention: Prevent Skin Injury  Recent Flowsheet Documentation  Taken 3/9/2024 0430 by Gila Chand RN  Body Position:   turned   right  Taken 3/9/2024 0024 by Gila Chand RN  Body Position:   turned   left  Device Skin Pressure Protection: tubing/devices free from skin contact  Goal: Readiness for Transition of Care  Outcome: Progressing     Problem: Enteral Nutrition  Goal: Absence of Aspiration Signs and Symptoms  Outcome: Progressing  Intervention: Minimize Aspiration Risk  Recent Flowsheet Documentation  Taken 3/9/2024 0430 by Gila Chand, RN  Head of Bed (HOB) Positioning: HOB at 30 degrees  Taken 3/9/2024 0024 by Gila Chand RN  Head of Bed (HOB) Positioning: HOB at 30 degrees  Goal: Safe, Effective Therapy Delivery  Outcome: Progressing  Goal: Feeding Tolerance  Outcome: Progressing   Goal Outcome Evaluation:      Pt slept well. Not signs of discomfort. Only became agitated during " oral cares. Incontinent of urine. No stools this shift. Oxygen saturation mid 90s on RA.

## 2024-03-09 NOTE — PLAN OF CARE
Problem: Adult Inpatient Plan of Care  Goal: Absence of Hospital-Acquired Illness or Injury  Intervention: Identify and Manage Fall Risk  Recent Flowsheet Documentation  Taken 3/8/2024 1615 by Sebastián Cummings RN  Safety Promotion/Fall Prevention:   increase visualization of patient   room door open   room organization consistent   safety round/check completed  Taken 3/8/2024 1000 by Sebastián Cummings RN  Safety Promotion/Fall Prevention:   increase visualization of patient   room door open   room organization consistent   safety round/check completed  Intervention: Prevent Skin Injury  Recent Flowsheet Documentation  Taken 3/8/2024 1800 by Sebastián Cummings RN  Body Position: left  Taken 3/8/2024 1615 by Sebastián Cummings RN  Body Position: supine, head elevated  Device Skin Pressure Protection: tubing/devices free from skin contact  Taken 3/8/2024 1600 by Sebastián Cummings RN  Body Position: left  Taken 3/8/2024 1400 by Sebastián Cummings RN  Body Position: right  Taken 3/8/2024 1200 by Sebastián Cummings RN  Body Position: left  Taken 3/8/2024 1000 by Sebastián Cummings RN  Body Position: supine, head elevated  Device Skin Pressure Protection: tubing/devices free from skin contact  Taken 3/8/2024 0900 by Sebastián Cummings RN  Body Position: supine, head elevated  Goal: Optimal Comfort and Wellbeing  Intervention: Monitor Pain and Promote Comfort  Recent Flowsheet Documentation  Taken 3/8/2024 0900 by Sebastián Cummings RN  Pain Management Interventions: music therapy     Problem: Gas Exchange Impaired  Goal: Optimal Gas Exchange  Intervention: Optimize Oxygenation and Ventilation  Recent Flowsheet Documentation  Taken 3/8/2024 1800 by Sebastián Cummings RN  Head of Bed (HOB) Positioning: HOB at 15 degrees  Taken 3/8/2024 1615 by Sebastián Cummings RN  Head of Bed (HOB) Positioning: HOB at 20 degrees  Taken 3/8/2024 1600 by Sebastián Cummings RN  Head of Bed (HOB) Positioning: HOB at 20-30 degrees  Taken 3/8/2024 1400 by Emiliano  BRENDA Lowery  Head of Bed (HOB) Positioning: HOB at 20-30 degrees  Taken 3/8/2024 1200 by Sebastián Cummings RN  Head of Bed (HOB) Positioning: HOB at 20 degrees  Taken 3/8/2024 1000 by Sebastián Cummings RN  Head of Bed (HOB) Positioning: HOB at 20 degrees  Taken 3/8/2024 0900 by Sebastián Cummings RN  Head of Bed (HOB) Positioning: HOB at 20 degrees     Problem: Comorbidity Management  Goal: Maintenance of Seizure Control  Intervention: Maintain Seizure Symptom Control  Recent Flowsheet Documentation  Taken 3/8/2024 1615 by Sebastián Cummings RN  Sensory Stimulation Regulation: music on  Seizure Precautions: side rails padded  Medication Review/Management: medications reviewed  Taken 3/8/2024 1000 by Sebastián Cummings RN  Sensory Stimulation Regulation: music on  Seizure Precautions: side rails padded  Medication Review/Management: medications reviewed     Problem: Enteral Nutrition  Goal: Absence of Aspiration Signs and Symptoms  Intervention: Minimize Aspiration Risk  Recent Flowsheet Documentation  Taken 3/8/2024 1800 by Sebastián Cummings RN  Oral Care: tongue brushed  Head of Bed (HOB) Positioning: HOB at 15 degrees  Taken 3/8/2024 1615 by Sebastián Cummings RN  Head of Bed (HOB) Positioning: HOB at 20 degrees  Taken 3/8/2024 1600 by Sebastián Cummings RN  Head of Bed (HOB) Positioning: HOB at 20-30 degrees  Taken 3/8/2024 1400 by Sebastián Cummings RN  Head of Bed (HOB) Positioning: HOB at 20-30 degrees  Taken 3/8/2024 1200 by Sebastián Cummings RN  Head of Bed (HOB) Positioning: HOB at 20 degrees  Taken 3/8/2024 1000 by Sebastián Cummings RN  Head of Bed (HOB) Positioning: HOB at 20 degrees  Taken 3/8/2024 0900 by Sebastián Cummings RN  Head of Bed (HOB) Positioning: HOB at 20 degrees     Problem: Pneumonia  Goal: Fluid Balance  Intervention: Monitor and Manage Fluid Balance  Recent Flowsheet Documentation  Taken 3/8/2024 1615 by Sebastián Cummings RN  Fluid/Electrolyte Management: (Free water ordered 4x daily)   fluids provided    other (see comments)  Taken 3/8/2024 1000 by Sebastián Cummings RN  Fluid/Electrolyte Management: (Free water ordered 4x daily)   fluids provided   other (see comments)  Goal: Resolution of Infection Signs and Symptoms  Intervention: Prevent Infection Progression  Recent Flowsheet Documentation  Taken 3/8/2024 1615 by Sebastián Cummings RN  Infection Management: (Isolation precautions maintained) other (see comments)  Isolation Precautions: contact precautions maintained  Taken 3/8/2024 1000 by Sebastián Cummings RN  Infection Management: (Isolation precautions maintained) other (see comments)  Isolation Precautions: contact precautions maintained  Goal: Effective Oxygenation and Ventilation  Intervention: Optimize Oxygenation and Ventilation  Recent Flowsheet Documentation  Taken 3/8/2024 1800 by Sebastián Cummings RN  Head of Bed (HOB) Positioning: HOB at 15 degrees  Taken 3/8/2024 1615 by Sebastián Cummings RN  Head of Bed (HOB) Positioning: HOB at 20 degrees  Taken 3/8/2024 1600 by Sebastián Cummings RN  Head of Bed (HOB) Positioning: HOB at 20-30 degrees  Taken 3/8/2024 1400 by Sebastián Cummings RN  Head of Bed (HOB) Positioning: HOB at 20-30 degrees  Taken 3/8/2024 1200 by Sebastián Cummings RN  Head of Bed (HOB) Positioning: HOB at 20 degrees  Taken 3/8/2024 1000 by Sebastián Cummings RN  Head of Bed (HOB) Positioning: HOB at 20 degrees  Taken 3/8/2024 0900 by Sebastián Cummings RN  Head of Bed (HOB) Positioning: HOB at 20 degrees   Goal Outcome Evaluation:       Patient has slept intermittently throughout shift. Tachycardic, but otherwise VSS. Julianne-area very red and irritated. Juilanne-lotion applied during routine bed change around approx. 1700. Patient nonverbal. No nonverbal signs of pain. Respirations equal and unlabored.

## 2024-03-09 NOTE — PROGRESS NOTES
[facility] [place]    Medicine Progress Note - Hospitalist Service    Date of Admission:  3/5/2024    Assessment & Plan   Elysia Arellano is a 58 year old female with history of cerebral palsy, developmental delay, nonverbal, neurogenic bowel and bladder, seizure disorder, hypothyroidism and chronic dysphagia admitted on 3/5/2024 with acute hypoxic respiratory failure and sepsis likely secondary to aspiration pneumonia.        Sepsis  Acute hypoxemic respiratory failure  -- Suspect secondary to aspiration pneumonia. CT on admission shows extensive endobronchial debris left main bronchus and left lung concerning for mucous plugging and/or aspiration with associated consolidation   -- Received IV cefepime given PCN allergy however there is risk of neurotoxicity and in setting of seizure disorder, changed to IV ceftriaxone and flagyl to cover aspiration PNA. Now only onIV Rocephin per ID.  -- Follow-up blood culture results  -- UA- pyuria noted. Above antibitoics should be sufficient  -- Continue oxygen as needed   -- Appreciate pulm consult. Discussed with Dr Doran. Unable to do flutter valve. Vest therapy started. If oxygenation doesnt improve, can re-consider bronchoscopy per pulm     Vancomycin allergy: initially started on IV vanco as part of sepsis protocol however developed rash  -- Stop vanco  -- Allergy added  -- PRN benadryl given  -- Monitor  -- MRSA swab- positive. Appreciate ID consult. As the patient is making clinical progress without MRSA coverage, there is no need for MRSA coverage per ID. It could be just a MRSA colonization.      Imaging evidence of possible stercoral colitis  Known history of neurogenic bowel related to cerebral palsy  -- MiraLAX twice daily, senna as needed, escalate bowel regimen as needed     Known history of oropharyngeal dysphagia  -- Has G-tube in place and is on chronic tube feeds  -- Strict n.p.o. Patient has been strict NPO for many years  -- Nutrition  "consult to resume tube feeds     Mild hypernatremia:  -- Continue water flushes per home routine  -- Resoved. Hypotonic IVF stopped.     Hypokalemia:  -- Replacement protocol  -- Placed on potassium containing IV fluids     Mild hypercalcemia: likely related to dehydration and home scheduled Oscal  -- Hold home Oscal  -- Trend for improvement with IVF     Mild troponin elevation consistent with demand ischemia  -- Recheck troponin only 30 compared to 29 on admission, EKG with sinus tachycardia  -- Stop trending troponin  -- Treat sepsis as above     Seizure disorder: Continue home Lamictal and Keppra     Hypothyroidism: Continue home replacement, TSH acceptable     GERD: Continue home PPI             Diet: NPO for Medical/Clinical Reasons Except for: NPO but receiving Tube Feeding  Adult Formula Drip Feeding: Continuous Jevity 1.5; Jejunostomy; Goal Rate: 100; mL/hr; From: 8:00 PM; To: 6:00 AM; Provide Synthroid dose 1 hr before starting TF    DVT Prophylaxis: Enoxaparin (Lovenox) SQ  Araujo Catheter: Not present  Lines: None     Cardiac Monitoring: None  Code Status: No CPR- Do NOT Intubate      Clinically Significant Risk Factors                         # Cachexia: Estimated body mass index is 17.1 kg/m  as calculated from the following:    Height as of this encounter: 1.626 m (5' 4\").    Weight as of this encounter: 45.2 kg (99 lb 10.4 oz)., PRESENT ON ADMISSION            Disposition Plan     Expected Discharge Date: 03/12/2024    Discharge Delays: IV Medication - consider oral or Home Infusion  Destination: group home              MALIA Karu  Hospitalist Service  [facility]  Securely message with [employer] (more info)  Text page via [employer] Paging/Directory   ______________________________________________________________________    Interval History   Patient looks better clinically. No fevers noted.     Physical Exam   Vital Signs: Temp: 97.6  F (36.4  C) Temp src: Oral BP: 117/76 " Pulse: 96   Resp: 18 SpO2: 99 % O2 Device: None (Room air)    Weight: 99 lbs 10.37 oz      General: Not in obvious distress.  HEENT: NC, AT   Chest: Coarse breath sounds, mostly on the left side  Heart: S1S2 normal, regular. No M/R/G  Abdomen: Soft. NT, ND. Bowel sounds- active. GJ in place  Extremities: No legs swelling  Neuro: Non-verbal. Opens eyes. Doesn't follow commands. Extremites are contracted. Extremity muscle wasting due to non-use noted.       Medical Decision Making             Data     I have personally reviewed the following data over the past 24 hrs:    6.7  \   12.1   / 243     139 108 (H) 13.5 /  110 (H)   3.7 20 (L) 0.32 (L) \       Imaging results reviewed over the past 24 hrs:   No results found for this or any previous visit (from the past 24 hour(s)).

## 2024-03-10 LAB
ANION GAP SERPL CALCULATED.3IONS-SCNC: 11 MMOL/L (ref 7–15)
BACTERIA BLD CULT: NO GROWTH
BACTERIA BLD CULT: NO GROWTH
BACTERIA SPEC CULT: NORMAL
BUN SERPL-MCNC: 14.5 MG/DL (ref 6–20)
CALCIUM SERPL-MCNC: 8.9 MG/DL (ref 8.6–10)
CHLORIDE SERPL-SCNC: 108 MMOL/L (ref 98–107)
CREAT SERPL-MCNC: 0.33 MG/DL (ref 0.51–0.95)
DEPRECATED HCO3 PLAS-SCNC: 21 MMOL/L (ref 22–29)
EGFRCR SERPLBLD CKD-EPI 2021: >90 ML/MIN/1.73M2
ERYTHROCYTE [DISTWIDTH] IN BLOOD BY AUTOMATED COUNT: 14.7 % (ref 10–15)
GLUCOSE SERPL-MCNC: 110 MG/DL (ref 70–99)
HCT VFR BLD AUTO: 40.5 % (ref 35–47)
HGB BLD-MCNC: 12.7 G/DL (ref 11.7–15.7)
MCH RBC QN AUTO: 30.3 PG (ref 26.5–33)
MCHC RBC AUTO-ENTMCNC: 31.4 G/DL (ref 31.5–36.5)
MCV RBC AUTO: 97 FL (ref 78–100)
PLATELET # BLD AUTO: 272 10E3/UL (ref 150–450)
POTASSIUM SERPL-SCNC: 4.1 MMOL/L (ref 3.4–5.3)
RBC # BLD AUTO: 4.19 10E6/UL (ref 3.8–5.2)
SODIUM SERPL-SCNC: 140 MMOL/L (ref 135–145)
WBC # BLD AUTO: 6 10E3/UL (ref 4–11)

## 2024-03-10 PROCEDURE — 120N000001 HC R&B MED SURG/OB

## 2024-03-10 PROCEDURE — 250N000013 HC RX MED GY IP 250 OP 250 PS 637: Performed by: INTERNAL MEDICINE

## 2024-03-10 PROCEDURE — 250N000011 HC RX IP 250 OP 636: Performed by: INTERNAL MEDICINE

## 2024-03-10 PROCEDURE — 36415 COLL VENOUS BLD VENIPUNCTURE: CPT | Performed by: INTERNAL MEDICINE

## 2024-03-10 PROCEDURE — 80048 BASIC METABOLIC PNL TOTAL CA: CPT | Performed by: INTERNAL MEDICINE

## 2024-03-10 PROCEDURE — 99232 SBSQ HOSP IP/OBS MODERATE 35: CPT | Performed by: INTERNAL MEDICINE

## 2024-03-10 PROCEDURE — 85027 COMPLETE CBC AUTOMATED: CPT | Performed by: INTERNAL MEDICINE

## 2024-03-10 RX ORDER — CEFUROXIME AXETIL 500 MG/1
500 TABLET ORAL EVERY 12 HOURS SCHEDULED
Status: DISCONTINUED | OUTPATIENT
Start: 2024-03-10 | End: 2024-03-11 | Stop reason: HOSPADM

## 2024-03-10 RX ADMIN — TOPIRAMATE 200 MG: 100 TABLET, FILM COATED ORAL at 09:08

## 2024-03-10 RX ADMIN — TOPIRAMATE 200 MG: 100 TABLET, FILM COATED ORAL at 20:59

## 2024-03-10 RX ADMIN — LEVETIRACETAM 1000 MG: 100 SOLUTION ORAL at 20:59

## 2024-03-10 RX ADMIN — DESONIDE: 0.5 CREAM TOPICAL at 21:15

## 2024-03-10 RX ADMIN — LAMOTRIGINE 200 MG: 100 TABLET ORAL at 09:08

## 2024-03-10 RX ADMIN — CEFUROXIME AXETIL 500 MG: 500 TABLET ORAL at 20:59

## 2024-03-10 RX ADMIN — LAMOTRIGINE 75 MG: 25 TABLET ORAL at 20:59

## 2024-03-10 RX ADMIN — POLYETHYLENE GLYCOL 3350 17 G: 17 POWDER, FOR SOLUTION ORAL at 09:08

## 2024-03-10 RX ADMIN — LAMOTRIGINE 200 MG: 100 TABLET ORAL at 20:59

## 2024-03-10 RX ADMIN — ENOXAPARIN SODIUM 30 MG: 30 INJECTION SUBCUTANEOUS at 19:19

## 2024-03-10 RX ADMIN — LEVETIRACETAM 1000 MG: 100 SOLUTION ORAL at 09:08

## 2024-03-10 RX ADMIN — CEFUROXIME AXETIL 500 MG: 500 TABLET ORAL at 09:09

## 2024-03-10 RX ADMIN — NYSTATIN: 100000 CREAM TOPICAL at 09:09

## 2024-03-10 RX ADMIN — LEVOTHYROXINE SODIUM 50 MCG: 0.03 TABLET ORAL at 19:13

## 2024-03-10 RX ADMIN — DESONIDE: 0.5 CREAM TOPICAL at 09:10

## 2024-03-10 RX ADMIN — Medication 40 MG: at 09:07

## 2024-03-10 RX ADMIN — Medication 1 DROP: at 21:15

## 2024-03-10 RX ADMIN — NYSTATIN: 100000 CREAM TOPICAL at 20:59

## 2024-03-10 RX ADMIN — LAMOTRIGINE 75 MG: 25 TABLET ORAL at 09:09

## 2024-03-10 RX ADMIN — FOLIC ACID 1 MG: 1 TABLET ORAL at 09:09

## 2024-03-10 ASSESSMENT — ACTIVITIES OF DAILY LIVING (ADL)
ADLS_ACUITY_SCORE: 71

## 2024-03-10 NOTE — PLAN OF CARE
Problem: Adult Inpatient Plan of Care  Goal: Optimal Comfort and Wellbeing  Intervention: Monitor Pain and Promote Comfort  Recent Flowsheet Documentation  Taken 3/10/2024 0900 by Macey Chambers RN  Pain Management Interventions: music therapy     Problem: Adult Inpatient Plan of Care  Goal: Optimal Comfort and Wellbeing  Intervention: Monitor Pain and Promote Comfort  Recent Flowsheet Documentation  Taken 3/10/2024 0900 by Macey Chambers RN  Pain Management Interventions: music therapy     Problem: Gas Exchange Impaired  Goal: Optimal Gas Exchange  Outcome: Progressing  Intervention: Optimize Oxygenation and Ventilation  Recent Flowsheet Documentation  Taken 3/10/2024 0900 by Macey Chambers, RN  Head of Bed (HOB) Positioning: HOB at 30 degrees   Goal Outcome Evaluation:      Patient total cares, large incontinent stool and large incontinent urine this am. Turned every 2 hours. Julianne area less red today and skin barrier applied. GT patent. Appears comfortable and sleeping in between cares.

## 2024-03-10 NOTE — PLAN OF CARE
"  Problem: Enteral Nutrition  Goal: Absence of Aspiration Signs and Symptoms  Outcome: Progressing  Intervention: Minimize Aspiration Risk  Recent Flowsheet Documentation  Taken 3/9/2024 2335 by Gila Chand RN  Oral Care: lip/mouth moisturizer applied  Head of Bed (HOB) Positioning: HOB at 30 degrees  Goal: Safe, Effective Therapy Delivery  Outcome: Progressing  Goal: Feeding Tolerance  Outcome: Progressing     Problem: Gas Exchange Impaired  Goal: Optimal Gas Exchange  Outcome: Progressing  Intervention: Optimize Oxygenation and Ventilation  Recent Flowsheet Documentation  Taken 3/9/2024 2335 by Gila Chand, RN  Head of Bed (HOB) Positioning: HOB at 30 degrees     Problem: Adult Inpatient Plan of Care  Goal: Plan of Care Review  Description: The Plan of Care Review/Shift note should be completed every shift.  The Outcome Evaluation is a brief statement about your assessment that the patient is improving, declining, or no change.  This information will be displayed automatically on your shift  note.  Outcome: Progressing  Goal: Patient-Specific Goal (Individualized)  Description: You can add care plan individualizations to a care plan. Examples of Individualization might be:  \"Parent requests to be called daily at 9am for status\", \"I have a hard time hearing out of my right ear\", or \"Do not touch me to wake me up as it startles  me\".  Outcome: Progressing  Goal: Absence of Hospital-Acquired Illness or Injury  Outcome: Progressing  Intervention: Identify and Manage Fall Risk  Recent Flowsheet Documentation  Taken 3/9/2024 2335 by Gila Chand, RN  Safety Promotion/Fall Prevention:   increased rounding and observation   increase visualization of patient  Intervention: Prevent Skin Injury  Recent Flowsheet Documentation  Taken 3/9/2024 2335 by Gila Chand, RN  Body Position:   turned   left  Device Skin Pressure Protection: tubing/devices free from skin contact  Goal: Readiness for " Transition of Care  Outcome: Progressing   Goal Outcome Evaluation:      Pt slept most of shift. Fidgets with arms when awake. TF running at 100/hr until 0700 d/t time change. Incontinent of B and B. Stool x 1 this shift. Lung sounds diminished. Oxygen saturation mid 90s on RA.

## 2024-03-10 NOTE — PROGRESS NOTES
[facility]    Medicine Progress Note - Hospitalist Service    Date of Admission:  3/5/2024    Assessment & Plan   Elysia Arellano is a 58 year old female with history of cerebral palsy, developmental delay, nonverbal, neurogenic bowel and bladder, seizure disorder, hypothyroidism and chronic dysphagia admitted on 3/5/2024 with acute hypoxic respiratory failure and sepsis likely secondary to aspiration pneumonia.        Sepsis  Acute hypoxemic respiratory failure  -- Suspect secondary to aspiration pneumonia. CT on admission shows extensive endobronchial debris left main bronchus and left lung concerning for mucous plugging and/or aspiration with associated consolidation   -- Received IV cefepime given PCN allergy however there is risk of neurotoxicity and in setting of seizure disorder, changed to IV ceftriaxone and flagyl to cover aspiration PNA. Changed to enteral Cefuroxime  -- Follow-up blood culture results  -- UA- pyuria noted. Above antibitoics should be sufficient  -- Appreciate pulm consult. Discussed with Dr Doran. Unable to do flutter valve. Vest therapy started. If oxygenation doesnt improve, can re-consider bronchoscopy per pulm     Vancomycin allergy: initially started on IV vanco as part of sepsis protocol however developed rash  -- Stop vanco  -- Allergy added  -- PRN benadryl given  -- Monitor  -- MRSA swab- positive. Appreciate ID consult. As the patient is making clinical progress without MRSA coverage, there is no need for MRSA coverage per ID. It could be just a MRSA colonization.      Imaging evidence of possible stercoral colitis  Known history of neurogenic bowel related to cerebral palsy  -- MiraLAX twice daily, senna as needed, escalate bowel regimen as needed     Known history of oropharyngeal dysphagia  -- Has G-tube in place and is on chronic tube feeds  -- Strict n.p.o. Patient has been strict NPO for many years  -- Nutrition consult to resume tube feeds    "  Mild hypernatremia:  -- Continue water flushes per home routine  -- Resoved. Hypotonic IVF stopped.     Hypokalemia:  -- Replacement protocol  -- Placed on potassium containing IV fluids     Mild hypercalcemia: likely related to dehydration and home scheduled Oscal  -- Hold home Oscal  -- Trend for improvement with IVF     Mild troponin elevation consistent with demand ischemia  -- Recheck troponin only 30 compared to 29 on admission, EKG with sinus tachycardia  -- Stop trending troponin  -- Treat sepsis as above     Seizure disorder: Continue home Lamictal and Keppra     Hypothyroidism: Continue home replacement, TSH acceptable     GERD: Continue home PPI             Diet: NPO for Medical/Clinical Reasons Except for: NPO but receiving Tube Feeding  Adult Formula Drip Feeding: Continuous Jevity 1.5; Jejunostomy; Goal Rate: 100; mL/hr; From: 8:00 PM; To: 6:00 AM; Provide Synthroid dose 1 hr before starting TF    DVT Prophylaxis: Enoxaparin (Lovenox) SQ  Araujo Catheter: Not present  Lines: None     Cardiac Monitoring: None  Code Status: No CPR- Do NOT Intubate      Clinically Significant Risk Factors                         # Cachexia: Estimated body mass index is 17.1 kg/m  as calculated from the following:    Height as of this encounter: 1.626 m (5' 4\").    Weight as of this encounter: 45.2 kg (99 lb 10.4 oz).             Disposition Plan      Expected Discharge Date: 03/11/2024    Discharge Delays: IV Medication - consider oral or Home Infusion  Destination: group home              MALIA Kaur  Hospitalist Service  [facility]  Securely message with [employer] (more info)  Text page via [employer] Paging/Directory   ______________________________________________________________________    Interval History   No acute issues overnight. No fevers. Tolerating tube feeding well.    Physical Exam   Vital Signs: Temp: 98.2  F (36.8  C) Temp src: Axillary BP: 121/78 Pulse: 86   Resp: 20 SpO2: 97 " % O2 Device: None (Room air)    Weight: 99 lbs 10.37 oz      General: Not in obvious distress.  HEENT: NC, AT   Chest: Coarse breath sounds, mostly on the left side  Heart: S1S2 normal, regular. No M/R/G  Abdomen: Soft. NT, ND. Bowel sounds- active. GJ in place  Extremities: No legs swelling  Neuro: Non-verbal. Opens eyes. Doesn't follow commands. Extremites are contracted. Extremity muscle wasting due to non-use noted.       Medical Decision Making             Data     I have personally reviewed the following data over the past 24 hrs:    6.0  \   12.7   / 272     140 108 (H) 14.5 /  110 (H)   4.1 21 (L) 0.33 (L) \       Imaging results reviewed over the past 24 hrs:   No results found for this or any previous visit (from the past 24 hour(s)).

## 2024-03-10 NOTE — PROGRESS NOTES
Pt is on RA sating mid 90's. Breath sounds diminished clear. Not spontaneously coughing. Will discharge vest tx.

## 2024-03-10 NOTE — PLAN OF CARE
Goal Outcome Evaluation:    Pt non verbal, sleeping between cares responds to stimulus and repositioning. Repositioned twice, pt incontinent of B&B. Free water flush in GJ tube around 1830 done.       Problem: Adult Inpatient Plan of Care  Goal: Absence of Hospital-Acquired Illness or Injury  Intervention: Prevent Skin Injury  Recent Flowsheet Documentation  Taken 3/9/2024 1629 by Kim Blank, RN  Body Position:   turned   right  Device Skin Pressure Protection:   tubing/devices free from skin contact   absorbent pad utilized/changed     Problem: Enteral Nutrition  Goal: Absence of Aspiration Signs and Symptoms  Intervention: Minimize Aspiration Risk  Recent Flowsheet Documentation  Taken 3/9/2024 1629 by Kim Blank RN  Head of Bed (HOB) Positioning: HOB at 30 degrees     Problem: Pneumonia  Goal: Resolution of Infection Signs and Symptoms  Intervention: Prevent Infection Progression  Recent Flowsheet Documentation  Taken 3/9/2024 1629 by Kim Blank RN  Infection Management: other (see comments)  Isolation Precautions: contact precautions maintained     Problem: Comorbidity Management  Goal: Maintenance of Seizure Control  Intervention: Maintain Seizure Symptom Control  Recent Flowsheet Documentation  Taken 3/9/2024 1629 by Kim Blank RN  Sensory Stimulation Regulation:   music on   lighting decreased   care clustered  Seizure Precautions: side rails padded  Medication Review/Management: medications reviewed     Problem: Pneumonia  Goal: Fluid Balance  Intervention: Monitor and Manage Fluid Balance  Recent Flowsheet Documentation  Taken 3/9/2024 1629 by Kim Blank RN  Fluid/Electrolyte Management: (free water flushes 4 times per day) other (see comments)         Plan of Care Reviewed With: patient    Overall Patient Progress: improvingOverall Patient Progress: improving

## 2024-03-11 VITALS
BODY MASS INDEX: 17.01 KG/M2 | OXYGEN SATURATION: 94 % | TEMPERATURE: 97.9 F | DIASTOLIC BLOOD PRESSURE: 50 MMHG | HEIGHT: 64 IN | HEART RATE: 86 BPM | RESPIRATION RATE: 18 BRPM | WEIGHT: 99.65 LBS | SYSTOLIC BLOOD PRESSURE: 102 MMHG

## 2024-03-11 LAB
ANION GAP SERPL CALCULATED.3IONS-SCNC: 9 MMOL/L (ref 7–15)
BUN SERPL-MCNC: 14.6 MG/DL (ref 6–20)
CALCIUM SERPL-MCNC: 9.1 MG/DL (ref 8.6–10)
CHLORIDE SERPL-SCNC: 107 MMOL/L (ref 98–107)
CREAT SERPL-MCNC: 0.33 MG/DL (ref 0.51–0.95)
DEPRECATED HCO3 PLAS-SCNC: 23 MMOL/L (ref 22–29)
EGFRCR SERPLBLD CKD-EPI 2021: >90 ML/MIN/1.73M2
ERYTHROCYTE [DISTWIDTH] IN BLOOD BY AUTOMATED COUNT: 14.6 % (ref 10–15)
GLUCOSE SERPL-MCNC: 102 MG/DL (ref 70–99)
HCT VFR BLD AUTO: 39.5 % (ref 35–47)
HGB BLD-MCNC: 12.3 G/DL (ref 11.7–15.7)
MCH RBC QN AUTO: 30.1 PG (ref 26.5–33)
MCHC RBC AUTO-ENTMCNC: 31.1 G/DL (ref 31.5–36.5)
MCV RBC AUTO: 97 FL (ref 78–100)
PLATELET # BLD AUTO: 304 10E3/UL (ref 150–450)
POTASSIUM SERPL-SCNC: 4 MMOL/L (ref 3.4–5.3)
RBC # BLD AUTO: 4.09 10E6/UL (ref 3.8–5.2)
SODIUM SERPL-SCNC: 139 MMOL/L (ref 135–145)
WBC # BLD AUTO: 5.9 10E3/UL (ref 4–11)

## 2024-03-11 PROCEDURE — 250N000011 HC RX IP 250 OP 636: Performed by: INTERNAL MEDICINE

## 2024-03-11 PROCEDURE — 80048 BASIC METABOLIC PNL TOTAL CA: CPT | Performed by: INTERNAL MEDICINE

## 2024-03-11 PROCEDURE — 85027 COMPLETE CBC AUTOMATED: CPT | Performed by: INTERNAL MEDICINE

## 2024-03-11 PROCEDURE — 99239 HOSP IP/OBS DSCHRG MGMT >30: CPT | Performed by: INTERNAL MEDICINE

## 2024-03-11 PROCEDURE — 250N000013 HC RX MED GY IP 250 OP 250 PS 637: Performed by: INTERNAL MEDICINE

## 2024-03-11 PROCEDURE — 36415 COLL VENOUS BLD VENIPUNCTURE: CPT | Performed by: INTERNAL MEDICINE

## 2024-03-11 RX ORDER — CEFUROXIME AXETIL 500 MG/1
500 TABLET ORAL EVERY 12 HOURS
Qty: 8 TABLET | Refills: 0 | Status: SHIPPED | OUTPATIENT
Start: 2024-03-11 | End: 2024-03-11

## 2024-03-11 RX ORDER — CEFUROXIME AXETIL 500 MG/1
500 TABLET ORAL EVERY 12 HOURS
Qty: 8 TABLET | Refills: 0 | Status: SHIPPED | OUTPATIENT
Start: 2024-03-11

## 2024-03-11 RX ADMIN — NYSTATIN: 100000 CREAM TOPICAL at 09:34

## 2024-03-11 RX ADMIN — LEVETIRACETAM 1000 MG: 100 SOLUTION ORAL at 09:35

## 2024-03-11 RX ADMIN — CEFUROXIME AXETIL 500 MG: 500 TABLET ORAL at 09:35

## 2024-03-11 RX ADMIN — LAMOTRIGINE 200 MG: 100 TABLET ORAL at 09:36

## 2024-03-11 RX ADMIN — Medication 40 MG: at 09:36

## 2024-03-11 RX ADMIN — FOLIC ACID 1 MG: 1 TABLET ORAL at 09:34

## 2024-03-11 RX ADMIN — DESONIDE: 0.5 CREAM TOPICAL at 09:34

## 2024-03-11 RX ADMIN — TOPIRAMATE 200 MG: 100 TABLET, FILM COATED ORAL at 09:36

## 2024-03-11 RX ADMIN — LAMOTRIGINE 75 MG: 25 TABLET ORAL at 09:35

## 2024-03-11 RX ADMIN — DIPHENHYDRAMINE HYDROCHLORIDE 12.5 MG: 50 INJECTION INTRAMUSCULAR; INTRAVENOUS at 04:04

## 2024-03-11 ASSESSMENT — ACTIVITIES OF DAILY LIVING (ADL)
ADLS_ACUITY_SCORE: 67
ADLS_ACUITY_SCORE: 71
ADLS_ACUITY_SCORE: 67
ADLS_ACUITY_SCORE: 71
ADLS_ACUITY_SCORE: 67

## 2024-03-11 NOTE — DISCHARGE SUMMARY
[facility] MEDICINE  DISCHARGE SUMMARY     Primary Care Physician: Fabricio Aguirre  Admission Date: 3/5/2024   Discharge Provider: MALIA Kaur Discharge Date: 3/11/2024   Diet: NPO   Code Status: Prior   Activity: DCACTIVITY: Activity as tolerated        Condition at Discharge: Stable     REASON FOR PRESENTATION(See Admission Note for Details)   Hypoxia    PRINCIPAL & ACTIVE DISCHARGE DIAGNOSES     Principal Problem:    Pneumonia due to infectious organism, unspecified laterality, unspecified part of lung  Active Problems:    Intellectual disability    Idiopathic generalized epilepsy, intractable (H)    Gastroesophageal reflux disease without esophagitis    Gastrostomy present (H)    Neurogenic bladder    History of cerebral palsy    Sepsis, due to unspecified organism, unspecified whether acute organ dysfunction present (H)    Stercoral colitis      PENDING LABS     Unresulted Labs Ordered in the Past 30 Days of this Admission       No orders found from 2/4/2024 to 3/6/2024.              PROCEDURES ( this hospitalization only)          RECOMMENDATIONS TO OUTPATIENT PROVIDER FOR F/U VISIT     Follow-up Appointments     Follow-up and recommended labs and tests       Follow up with primary care provider, Fabricio Aguirre, within 7 days   for hospital follow- up.                DISPOSITION     Back to     SUMMARY OF HOSPITAL COURSE:      Elysia Arellano is a 58 year old female with history of cerebral palsy, developmental delay, nonverbal, neurogenic bowel and bladder, seizure disorder, hypothyroidism and chronic dysphagia admitted on 3/5/2024 with acute hypoxic respiratory failure and sepsis likely secondary to aspiration pneumonia.        Sepsis  Acute hypoxemic respiratory failure  -- Suspect secondary to aspiration pneumonia. CT on admission shows extensive endobronchial debris left main bronchus and left lung concerning for mucous plugging and/or aspiration with  associated consolidation   -- Received IV cefepime given PCN allergy however there is risk of neurotoxicity and in setting of seizure disorder, changed to IV ceftriaxone and flagyl to cover aspiration PNA. Changed to enteral Cefuroxime  -- Follow-up blood culture results  -- UA- pyuria noted. Above antibitoics should be sufficient  -- Appreciate pulm consult. Discussed with Dr Doran. Unable to do flutter valve. Vest therapy started.   -- Off of O2     Vancomycin allergy: initially started on IV vanco as part of sepsis protocol however developed rash  -- Stop vanco  -- Allergy added  -- PRN benadryl given  -- Monitor  -- MRSA swab- positive. Appreciate ID consult. As the patient is making clinical progress without MRSA coverage, there is no need for MRSA coverage per ID. It could be just a MRSA colonization.      Imaging evidence of possible stercoral colitis  Known history of neurogenic bowel related to cerebral palsy  -- MiraLAX twice daily, senna as needed, escalate bowel regimen as needed     Known history of oropharyngeal dysphagia  -- Has G-tube in place and is on chronic tube feeds  -- Strict n.p.o. Patient has been strict NPO for many years  -- Nutrition consulted to resume tube feeds     Mild hypernatremia:  -- Continue water flushes per home routine  -- Resoved. Hypotonic IVF stopped.     Hypokalemia:  -- Replacement protocol  -- Placed on potassium containing IV fluids     Mild hypercalcemia: likely related to dehydration and home scheduled Oscal  -- Hold home Oscal  -- Trend for improvement with IVF     Mild troponin elevation consistent with demand ischemia  -- Recheck troponin only 30 compared to 29 on admission, EKG with sinus tachycardia  -- Stop trending troponin  -- Treat sepsis as above     Seizure disorder: Continue home Lamictal and Keppra     Hypothyroidism: Continue home replacement, TSH acceptable     GERD: Continue home PPI    Discharge Medications with Med changes:     Discharge Medication  List as of 3/11/2024 11:30 AM        CONTINUE these medications which have CHANGED    Details   cefuroxime (CEFTIN) 500 MG tablet 1 tablet (500 mg) by Per Feeding Tube route every 12 hours, Disp-8 tablet, R-0, E-Prescribe           CONTINUE these medications which have NOT CHANGED    Details   acetaminophen (TYLENOL) 325 MG tablet Take 650 mg by mouth every 4 hours as needed for mild pain As needed for pain, discomfort, and fever.  Not to exceed 3000 mg in 24 hours., Historical      arginine (ARGINAID) PACK Take 1 packet by mouth 2 times daily Per G-tube mixed with 4 ounces of water, Historical      bisacodyl (DULCOLAX) 10 MG suppository Place 10 mg rectally every other day, Historical      calcium carbonate (OS-NATHALIA) 500 MG TABS 1 tablet by Per Feeding Tube route 2 times daily crushed, Historical      carboxymethylcellulose PF (REFRESH PLUS) 0.5 % SOLN ophthalmic solution 1 drop At Bedtime, Historical      desonide (DESOWEN) 0.05 % external cream Apply topically 2 times daily .  Hold if no red, itchy, scaly areas on face.Historical      Dextromethorphan-guaiFENesin  MG/5ML syrup Take 10 mLs by mouth every 6 hours as needed for cough, Historical      diazepam (VALIUM) 5 MG/ML (HIGH CONC) solution Take 5 mg by mouth every 8 hours as needed for anxiety, Historical      diphenhydrAMINE (BENADRYL) 12.5 MG/5ML solution Take 25 mg by mouth every 4 hours as needed for sleep , Historical      folic acid (FOLVITE) 1 MG tablet Take 1 mg by mouth daily Per G- tube, Historical      lamoTRIgine (LAMICTAL) 200 MG TBDP ODT tab DISSOLVE 1 TABLET IN 5-10ML OF WATER AND TAKE PER G-TUBE TWICE DAILY ALONG WITH 75MG FOR TOTAL DOSE 275MG, Disp-186 tablet, R-3, E-Prescribe      lamoTRIgine (LAMICTAL) 25 MG chewable tablet 3 tablets (75 mg) by Per G Tube route 2 times daily, Disp-540 tablet, R-3, E-Prescribe      levETIRAcetam (KEPPRA) 100 MG/ML oral solution GIVE 10ML (1000MG) PER G-TUBE TWICE DAILY, Disp-600 mL, R-11, E-Prescribe     "  levothyroxine (SYNTHROID/LEVOTHROID) 50 MCG tablet Take 50 mcg by mouth daily Per G-tube, Historical      multivitamin w/minerals (THERA-VIT-M) tablet Take 1 tablet by mouth daily Per G-tube, Historical      nystatin (MYCOSTATIN) 730533 UNIT/GM external cream Apply topically 3 times daily as needed for other (TID To groin, and two times daily to G-tube site)Disp-60 g, R-0Local Print      omeprazole (PRILOSEC) 40 MG DR capsule 20 mg daily Per g tube, Historical      protein modular, BENEPROTEIN, PACK 7 g by Per Feeding Tube route 2 times daily, Historical      Psyllium (GENFIBER PO) Take 2 Tablespoonful by mouth daily Per G-tube with 4 ounces of water flush, Historical      Sodium Phosphates (FLEET ENEMA RE) Place rectally every other day If no results from suppository, Historical      SOLUBLE FIBER THERAPY powder Mix one tablespoonful in liquid and give per G-tube once daily, SAUL, Historical      topiramate (TOPAMAX) 200 MG tablet TAKE 1 TABLET PER G-TUBE TWICE DAILY, Disp-186 tablet, R-3, E-Prescribe      VITAMIN D (CHOLECALCIFEROL) PO Take 400 Units by mouth daily 2.5ml qd, Historical      Water For Injection Sterile (STERILE WATER, PRESERVATIVE FREE,) injection 4 times daily 300cc per j- tube, Historical                   Rationale for medication changes:      Please see above        Consults   Pulm and ID      Immunizations given this encounter     Most Recent Immunizations   Administered Date(s) Administered    COVID-19 Monovalent 18+ (Moderna) 12/28/2021    Flu, Unspecified 10/04/2016    Influenza Vaccine 18-64 (Flublok) 09/26/2019    Influenza Vaccine >6 months,quad, PF 12/13/2023           Anticoagulation Information      Recent INR results: No results for input(s): \"INR\" in the last 168 hours.  Warfarin doses (if applicable) or name of other anticoagulant: NA      SIGNIFICANT IMAGING FINDINGS     Results for orders placed or performed during the hospital encounter of 03/05/24   CT Chest/Abdomen/Pelvis w " Contrast    Impression    IMPRESSION:  1.  Extensive endobronchial debris throughout the left main bronchus and left lung airways concerning for mucous plugging and/or aspiration. There is associated consolidation in the lingula and left lower lobe.    2.  Large amount of stool in the distal sigmoid colon and rectum suggesting stercoral colitis.       SIGNIFICANT LABORATORY FINDINGS     Most Recent 3 CBC's:  Recent Labs   Lab Test 03/11/24  0530 03/10/24  0539 03/09/24  0522   WBC 5.9 6.0 6.7   HGB 12.3 12.7 12.1   MCV 97 97 98    272 243     Most Recent 3 BMP's:  Recent Labs   Lab Test 03/11/24  0530 03/10/24  0539 03/09/24  0522    140 139   POTASSIUM 4.0 4.1 3.7   CHLORIDE 107 108* 108*   CO2 23 21* 20*   BUN 14.6 14.5 13.5   CR 0.33* 0.33* 0.32*   ANIONGAP 9 11 11   NATHALIA 9.1 8.9 8.6   * 110* 110*     Most Recent 2 LFT's:  Recent Labs   Lab Test 03/05/24  1323 02/12/24  0953   AST 22 20   ALT 9 8   ALKPHOS 250* 283*   BILITOTAL 0.3 <0.2     Most Recent 3 INR's:  Recent Labs   Lab Test 08/11/20  1217 08/10/20  1218 08/09/20  0734   INR 1.03 1.07 0.96         Discharge Orders        Primary Care - Care Coordination Referral      Reason for your hospital stay    Pneumonia     Follow-up and recommended labs and tests     Follow up with primary care provider, Fabricio Aguirre, within 7 days for hospital follow- up.     Activity    Your activity upon discharge: activity as tolerated     Discharge Instructions    1. Patient has no communicable disease.  2. Ok to resume the GH activities.     Diet    Follow this diet upon discharge: Orders Placed This Encounter      Adult Formula Drip Feeding: Continuous Jevity 1.5; Jejunostomy; Goal Rate: 100; mL/hr; From: 8:00 PM; To: 6:00 AM; Provide Synthroid dose 1 hr before starting TF      NPO for Medical/Clinical Reasons Except for: NPO but receiving Tube Feeding       Examination   /50 (BP Location: Right arm)   Pulse 86   Temp 97.9  F (36.6  C)  "(Axillary)   Resp 18   Ht 1.626 m (5' 4\")   Wt 45.2 kg (99 lb 10.4 oz)   SpO2 94%   BMI 17.10 kg/m    General: Not in obvious distress.  HEENT: NC, AT   Chest: Coarse breath sounds, mostly on the left side  Heart: S1S2 normal, regular. No M/R/G  Abdomen: Soft. NT, ND. Bowel sounds- active. GJ in place  Extremities: No legs swelling  Neuro: Non-verbal. Opens eyes. Doesn't follow commands. Extremites are contracted. Extremity muscle wasting due to non-use noted.     Please see EMR for more detailed significant labs, imaging, consultant notes etc.    I, MALIA Kaur, personally saw the patient today and spent greater than 30 minutes discharging this patient.    MALIA Kaur  [facility]    CC:Fabricio Aguirre    "

## 2024-03-11 NOTE — PROGRESS NOTES
"CLINICAL NUTRITION SERVICES - REASSESSMENT NOTE     Nutrition Prescription    RECOMMENDATIONS FOR MDs/PROVIDERS TO ORDER:      Malnutrition Status:    Patient does not meet two of the criteria necessary for diagnosing malnutrition    Recommendations already ordered by Registered Dietitian (RD):  Continue current TF regimen     Future/Additional Recommendations:  Will monitor TF, weight, labs, BM, I/Os.       EVALUATION OF THE PROGRESS TOWARD GOALS   Diet: NPO  Nutrition Support: TF   Jevity 1.5 (hospital equivalent to Isosource 1.5) at 100 mL/hr x 10 hrs 8 pm-6 am   Water flushes 300 mL 4x/day    Provides 1500 kcals, 64 gm protein, 216 gm CHO, 21 gm fiber, 760 mL free water (1960 mL with flushes) in 1000 mL formula     NEW FINDINGS   Pt has a G-J tube. Pt has been on strict NPO for many years per chart review with home TF (Isosource 1.5). Pt is nonverbal.     Discussed interaction between levothyroxine and TF with pharmacy, plan for pharmacy to adjust dosing time prior at least x1 hr prior to TF start.   NKFA     ANTHROPOMETRICS  Height: 162.6 cm (5' 4\") questions accuracy, other RD notes show 5'1\", 4'10\"   Most Recent Weight: 45.2 kg (99 lb 10.4 oz)    IBW: 47.7 kg (based on height of 5'1\")   BMI: Underweight BMI <18.5  Weight History:   03/06/24 46.2 kg (101 lb 13.6 oz)   10/26/23 42.2 kg (93 lb)   05/24/23 44 kg (97 lb)   04/27/22 48.1 kg (106 lb)   04/15/21 57.6 kg (127 lb)   12/05/19 52.6 kg (116 lb)   11/21/19 53 kg (116 lb 14.4 oz)   09/17/19 53.5 kg (118 lb)   08/27/19 53.5 kg (118 lb)     Dosing Weight: 42.2 kg     ASSESSED NUTRITION NEEDS  Estimated Energy Needs: 3082-8682 kcals/day (30 - 35 kcals/kg )  Justification: Underweight  Estimated Protein Needs: 51-63 grams protein/day (1.2 - 1.5 grams of pro/kg)  Justification: Increased needs  Estimated Fluid Needs: 5214-2606 mL/day (1 mL/kcal)   Justification: Increased needs    PHYSICAL FINDINGS/GI CONCERNS  See malnutrition section below.  Per " Flowhseets:  GI: Rounded abd, G-J tube LUQ placed 1/31/24   BM: x2 this AM, x5 3/10     LABS  Reviewed  Creat 0.33(L)   On K replacement protocols     MEDICATIONS  Reviewed  Scheduled lovenox, keppra, levothyroxine, protonix, miralax    MALNUTRITION  Malnutrition Diagnosis: Patient does not meet two of the above criteria necessary for diagnosing malnutrition    CURRENT NUTRITION DIAGNOSIS  Inadequate oral intake related to dysphagia as evidenced by NPO and reliance on tube feedings.     Evaluation: Ongoing     INTERVENTIONS  Implementation  Continue current TF regimen     Goals  Maintain/gain weight- Not Met  Meet nutrition needs with TF- Met  Electrolytes WNL- Met    Monitoring/Evaluation  Progress toward goals will be monitored and evaluated per protocol.

## 2024-03-11 NOTE — PROGRESS NOTES
Care Management Discharge Note    Discharge Date: 03/11/2024       Discharge Disposition:  [facility]    Discharge Services:  none    Discharge DME:  none    Discharge Transportation: [facility] 11:07-11:52     Private pay costs discussed: Not applicable    Does the patient's insurance plan have a 3 day qualifying hospital stay waiver?  Yes     Which insurance plan 3 day waiver is available? [employer]    Will the waiver be used for post-acute placement? No    PAS Confirmation Code:  n/a  Patient/family educated on Medicare website which has current facility and service quality ratings:  yes    Education Provided on the Discharge Plan: yes   Persons Notified of Discharge Plans: Mother/Guardian Reshma  Patient/Family in Agreement with the Plan:  yes    Handoff Referral Completed: Yes    Additional Information:  Pt to discharge back to [facility] via [facility] between 11:07-11:52. Bedside nurse, charge nurse, and HUC have been notified of discharge plans. SW intern called pt alex Justice and informed her of discharge time and possible private pay after insurance. A voice message was left for RN Marion, stating discharge time. SW intern called [facility] and informed them of pt discharge time.     JOSE [facility]   3/11/2024

## 2024-03-11 NOTE — PLAN OF CARE
"  Problem: Enteral Nutrition  Goal: Absence of Aspiration Signs and Symptoms  Outcome: Progressing  Intervention: Minimize Aspiration Risk  Recent Flowsheet Documentation  Taken 3/11/2024 0115 by Gila Chand, RN  Oral Care: lip/mouth moisturizer applied  Head of Bed (HOB) Positioning: HOB at 30 degrees  Goal: Safe, Effective Therapy Delivery  Outcome: Progressing  Goal: Feeding Tolerance  Outcome: Progressing     Problem: Adult Inpatient Plan of Care  Goal: Plan of Care Review  Description: The Plan of Care Review/Shift note should be completed every shift.  The Outcome Evaluation is a brief statement about your assessment that the patient is improving, declining, or no change.  This information will be displayed automatically on your shift  note.  Outcome: Progressing  Goal: Patient-Specific Goal (Individualized)  Description: You can add care plan individualizations to a care plan. Examples of Individualization might be:  \"Parent requests to be called daily at 9am for status\", \"I have a hard time hearing out of my right ear\", or \"Do not touch me to wake me up as it startles  me\".  Outcome: Progressing  Goal: Absence of Hospital-Acquired Illness or Injury  Outcome: Progressing  Intervention: Identify and Manage Fall Risk  Recent Flowsheet Documentation  Taken 3/11/2024 0115 by Gila Chand, RN  Safety Promotion/Fall Prevention:   increased rounding and observation   increase visualization of patient  Intervention: Prevent Skin Injury  Recent Flowsheet Documentation  Taken 3/11/2024 0115 by Gila Chand, RN  Body Position:   turned   left  Skin Protection: silicone foam dressing in place  Device Skin Pressure Protection: tubing/devices free from skin contact  Goal: Readiness for Transition of Care  Outcome: Progressing   Goal Outcome Evaluation:      Pt more alert tonight, nonverbal, minimal mobility with arms. Incontinent of B and B. Loose stool x 2. TF will run until 0700 d/t late start. " Perineum and sacrum reddened. Mepilex on sacrum/coccyx, cream applied to perineum. Pt restless mid shift. Areas of redness and scratch marks noted on pts arms, chest, and face. Pt trying to scratch at abdomen while writer changing brief. Benadryl given with good results.

## 2024-03-11 NOTE — PLAN OF CARE
Problem: Adult Inpatient Plan of Care  Goal: Readiness for Transition of Care  Outcome: Adequate for Care Transition   Goal Outcome Evaluation:       Patient discharging back to group home. Group home aware. Patient leaving in personal wheelchair via transport. Personal belongings sent.

## 2024-03-20 LAB
ATRIAL RATE - MUSE: 113 BPM
DIASTOLIC BLOOD PRESSURE - MUSE: NORMAL MMHG
INTERPRETATION ECG - MUSE: NORMAL
P AXIS - MUSE: 77 DEGREES
PR INTERVAL - MUSE: 158 MS
QRS DURATION - MUSE: 74 MS
QT - MUSE: 320 MS
QTC - MUSE: 438 MS
R AXIS - MUSE: 42 DEGREES
SYSTOLIC BLOOD PRESSURE - MUSE: NORMAL MMHG
T AXIS - MUSE: 28 DEGREES
VENTRICULAR RATE- MUSE: 113 BPM

## 2024-04-11 ENCOUNTER — HOSPITAL ENCOUNTER (OUTPATIENT)
Dept: INTERVENTIONAL RADIOLOGY/VASCULAR | Facility: CLINIC | Age: 59
Discharge: HOME | End: 2024-04-11
Attending: NURSE PRACTITIONER | Admitting: RADIOLOGY
Payer: MEDICARE

## 2024-04-11 VITALS — DIASTOLIC BLOOD PRESSURE: 67 MMHG | OXYGEN SATURATION: 98 % | HEART RATE: 82 BPM | SYSTOLIC BLOOD PRESSURE: 128 MMHG

## 2024-04-11 DIAGNOSIS — R63.30 FEEDING DIFFICULTIES: ICD-10-CM

## 2024-04-11 DIAGNOSIS — R63.30 FEEDING DIFFICULTIES: Primary | ICD-10-CM

## 2024-04-11 PROCEDURE — C1769 GUIDE WIRE: HCPCS

## 2024-04-11 PROCEDURE — 49452 REPLACE G-J TUBE PERC: CPT

## 2024-04-11 PROCEDURE — 272N000117 HC CATH CR2

## 2024-04-11 NOTE — DISCHARGE INSTRUCTIONS
Gastrostomy (G) or Gastrojejunostomy (G/J) Tube Exchange Discharge Instructions:   You had a gastrostomy (G) tube or a Gastrojejunostomy Tube (GJ) exchanged today 4/11/2024.  This tube is often used for nutritional support and medication administration or it can be used for stomach venting.     Care instructions:  - If you received sedation for your procedure, do not drive or operate heavy machinery for the rest of the day.  - Avoid soaking in stagnant water (tub baths, Jacuzzis, pools, lake, or ocean).   - You may shower beginning the day after the tube was exchanged.   - Clean under the disc daily with soap and water. Pat dry under disc and apply new split gauze dressing under disc. Cleaning tube site daily will help prevent infection and skin irritation.  - A small amount of clear tan drainage from the tube exit site can be normal.  - Make sure the disc on the tube fits slightly snug against the skin so that the tube does not move in or out of the body easily.   - If you experience leaking from around tube exit site, the most common cause is that the disc is not tightened against the skin.   - To tighten the disc, pull gently on the tube so the retention balloon (under the skin) is pulled up against the skin under the tube. Push the disc down until it is tight against the skin without a gap between the skin and the disc.  - Flush your tube with 60cc of water twice a day (using a cath tip syringe) to prevent tube from clogging (or follow recommendations from your doctor or dietician if given).    Giving Feedings and Medications:  - Follow-up with your dietician, primary care provider, or oncologist for instructions on tube feedings and medication administration.    - ONLY use specific enteric feedings with your tube (unless otherwise discussed with your dietitian).    - Flush tube at least twice daily with 60ml of water using cath tip syringe unless otherwise instructed by your doctor or dietician.  - Flush the  tube before and after administrating medications and bolus feedings with 60cc of water.    Follow Up:  - Recommend routine 3 month exchanges of feeding tube. Please contact your primary care provider or speak with your dietitian to obtain an order to have your G/GJ tube exchanged. Then contact [facility]'s Medical Imaging scheduling department at [phone] to schedule your G/GJ tube exchange appointment.    Please seek medical evaluation for:  - Fever (greater than 101 F (38.3C).  - Purulent (yellow/green/foul smelling) drainage from tube exit site.   - Significant or worsening abdominal pain.   - Skin that is hot to the touch or significantly reddened at the tube exit site.   - Bleeding at tube exit site.    Call [facility] RN Line at [phone] with questions or if you have any of the following symptoms:  - Tube falls out or felt to be out of position.  - Unable to flush tube.  - Significant leakage around tube site (tube feeding, medications or drainage).  - Significant bleeding at the tube exit site.  - Severe pain at tube exit site.

## 2024-04-11 NOTE — PROGRESS NOTES
Patient Name: Elysia Arellano  Medical Record Number: [id number]  Today's Date: 4/11/2024    Procedure: IR GJ tube exchange  Proceduralist: Dr. Anna    Procedure Start: 1520  Procedure end: 1528  Sedation medications administered: N/A  Sedation time: N/A    : N/A    GJ tube exchanged. Discharge criteria met. Discharge instructions given and reviewed with patient and caregiver. No further questions or concerns. Pt d/c'd home with caregiver.

## 2024-04-26 ENCOUNTER — LAB REQUISITION (OUTPATIENT)
Dept: LAB | Facility: HOSPITAL | Age: 59
End: 2024-04-26
Payer: MEDICARE

## 2024-04-26 DIAGNOSIS — F73 PROFOUND INTELLECTUAL DISABILITIES: ICD-10-CM

## 2024-04-26 DIAGNOSIS — G80.9 CEREBRAL PALSY, UNSPECIFIED (H): ICD-10-CM

## 2024-04-26 DIAGNOSIS — D64.9 ANEMIA, UNSPECIFIED: ICD-10-CM

## 2024-04-26 LAB
IRON BINDING CAPACITY (ROCHE): 366 UG/DL (ref 240–430)
IRON SATN MFR SERPL: 13 % (ref 15–46)
IRON SERPL-MCNC: 49 UG/DL (ref 37–145)
LEVETIRACETAM SERPL-MCNC: 64.8 ΜG/ML (ref 10–40)

## 2024-04-26 PROCEDURE — 80201 ASSAY OF TOPIRAMATE: CPT | Mod: ORL | Performed by: FAMILY MEDICINE

## 2024-04-26 PROCEDURE — 83550 IRON BINDING TEST: CPT | Mod: ORL | Performed by: FAMILY MEDICINE

## 2024-04-26 PROCEDURE — 80177 DRUG SCRN QUAN LEVETIRACETAM: CPT | Mod: ORL | Performed by: FAMILY MEDICINE

## 2024-04-26 PROCEDURE — 36415 COLL VENOUS BLD VENIPUNCTURE: CPT | Mod: ORL | Performed by: FAMILY MEDICINE

## 2024-04-27 LAB — TOPIRAMATE SERPL-MCNC: 16.3 UG/ML

## 2024-05-17 NOTE — PROGRESS NOTES
__________________________________  ESTABLISHED PATIENT NEUROLOGY NOTE    DATE OF VISIT: 4/24/2023  MRN: [id number]  PATIENT NAME: Elysia Arellano  YOB: 1965    Chief Complaint   Patient presents with    Seizures     Aid states that patient is having seizures.     SUBJECTIVE:                                                      HISTORY OF PRESENT ILLNESS:  Elysia is here for follow up regarding seizures    Elysia Arellano is a 57-year-old female with mental retardation, cerebral palsy, intractable epilepsy, resident of a group home who returns for yearly follow-up for her seizure disorder.  She follows with Dr. Forman in the clinic last seen 4/27/2022.  Per chart review, she had remained seizure-free.  She was continued on Keppra, lamotrigine and Topamax and I had recommended checking blood levels that were not done.  Recently lamotrigine level was done that was within normal limits but Topamax and Keppra level were done but a year ago and was therapeutic.  According to caregiver there has been no significant change.  She has not experienced any seizures.  She is wheelchair-bound and minimally communicative.    05/24/23: Elysia presents to the clinic for annual epilepsy follow-up.  She is accompanied by, Tamie, her group home nurse. Elysia is nonverbal and Tamie speaks on her behalf.  He states that she has remained seizure-free.  He denies any loss of consciousness or seizure-like activity.  He reports that she is taking her medications as prescribed and tolerating well.  She is also taking vitamin D and calcium.  No other concerns at this time    05/20/24: Elysia presents to the clinic for her annual seizure follow up. She is accompanied by Srinivas who is a .  Srinivas tells me that he has worked with Elysia for 7 years.  He reports that she has 5 to 10-second seizures once or twice per week for as long as he has worked with her.  He describes this as her moaning out and stretching her  arms above her head.  He denies any tonic-clonic movement or loss of consciousness.  She continues to take her medications as prescribed and tolerates well with out any known adverse effects.  No other questions or concerns today     Current Anti-Seizure Medications:    Lamictal-75 mg twice daily  Keppra-1000 mg twice daily  Topamax 200 mg twice daily    Perceived AED Side Effects: No    Medication Notes:   AED Medication Compliance:  compliant administered by nurse at [facility] through PEG tube     Psycho-Social History: Elysia Arellano currently lives [facility] group [facility]. Employment status: Stopped going to [employer] during pandemic    Patient does not smoke, no alcohol use, no recreational drug use..      Current Medications:   Current Outpatient Medications   Medication Sig Dispense Refill    acetaminophen (TYLENOL) 325 MG tablet Take 650 mg by mouth every 4 hours as needed for mild pain As needed for pain, discomfort, and fever.  Not to exceed 3000 mg in 24 hours.      arginine (ARGINAID) PACK Take 1 packet by mouth 2 times daily Per G-tube mixed with 4 ounces of water      bisacodyl (DULCOLAX) 10 MG suppository Place 10 mg rectally every other day      calcium carbonate (OS-NATHALIA) 500 MG TABS 1 tablet by Per Feeding Tube route 2 times daily crushed      carboxymethylcellulose PF (REFRESH PLUS) 0.5 % SOLN ophthalmic solution 1 drop At Bedtime      cefuroxime (CEFTIN) 500 MG tablet 1 tablet (500 mg) by Per Feeding Tube route every 12 hours 8 tablet 0    desonide (DESOWEN) 0.05 % external cream Apply topically 2 times daily .  Hold if no red, itchy, scaly areas on face.      Dextromethorphan-guaiFENesin  MG/5ML syrup Take 10 mLs by mouth every 6 hours as needed for cough      diazepam (VALIUM) 5 MG/ML (HIGH CONC) solution Take 5 mg by mouth every 8 hours as needed for anxiety      diphenhydrAMINE (BENADRYL) 12.5 MG/5ML solution Take 25 mg by mouth every 4 hours as needed for sleep       folic acid (FOLVITE) 1  MG tablet Take 1 mg by mouth daily Per G- tube      lamoTRIgine (LAMICTAL) 200 MG TBDP ODT tab DISSOLVE 1 TABLET IN 5-10ML OF WATER AND TAKE PER G-TUBE TWICE DAILY ALONG WITH 75MG FOR TOTAL DOSE 275MG 186 tablet 3    lamoTRIgine (LAMICTAL) 25 MG chewable tablet 3 tablets (75 mg) by Per G Tube route 2 times daily 540 tablet 3    levETIRAcetam (KEPPRA) 100 MG/ML oral solution GIVE 10ML (1000MG) PER G-TUBE TWICE DAILY 600 mL 11    levothyroxine (SYNTHROID/LEVOTHROID) 50 MCG tablet Take 50 mcg by mouth daily Per G-tube      multivitamin w/minerals (THERA-VIT-M) tablet Take 1 tablet by mouth daily Per G-tube      nystatin (MYCOSTATIN) 637690 UNIT/GM external cream Apply topically 3 times daily as needed for other (TID To groin, and two times daily to G-tube site) 60 g 0    omeprazole (PRILOSEC) 40 MG DR capsule 20 mg daily Per g tube      protein modular, BENEPROTEIN, PACK 7 g by Per Feeding Tube route 2 times daily      Psyllium (GENFIBER PO) Take 2 Tablespoonful by mouth daily Per G-tube with 4 ounces of water flush      Sodium Phosphates (FLEET ENEMA RE) Place rectally every other day If no results from suppository      SOLUBLE FIBER THERAPY powder Mix one tablespoonful in liquid and give per G-tube once daily      topiramate (TOPAMAX) 200 MG tablet TAKE 1 TABLET PER G-TUBE TWICE DAILY 186 tablet 3    VITAMIN D (CHOLECALCIFEROL) PO Take 400 Units by mouth daily 2.5ml qd      Water For Injection Sterile (STERILE WATER, PRESERVATIVE FREE,) injection 4 times daily 300cc per j- tube       No current facility-administered medications for this visit.     Past Medical History:   Patient  has a past medical history of Cerebral palsy (H), Depression, GERD (gastroesophageal reflux disease), Hyperopia, Mental retardation, Osteoporosis, Pneumonia due to 2019 novel coronavirus (8/7/2020), Pyelonephritis, Scoliosis, Seizure disorder (H), and UTI (urinary tract infection).  Surgical History:  She  has a past surgical history that  includes IR Gastro Jejunostomy Tube Placement; Scoliosis S/P Lewis Valentín Placement; IR Miscellaneous Procedure (6/19/2000); IR Abscess Tube Change (6/27/2000); IR Abscess Tube Change (9/5/2000); IR Abscess Tube Change (3/15/2001); IR Abscess Tube Change (7/6/2001); IR Abscess Tube Change (9/4/2001); IR Miscellaneous Procedure (12/22/2001); IR Miscellaneous Procedure (1/7/2002); IR Miscellaneous Procedure (1/19/2002); IR Miscellaneous Procedure (1/19/2002); IR Miscellaneous Procedure (2/20/2002); IR Miscellaneous Procedure (2/20/2002); IR Miscellaneous Procedure (3/19/2002); IR Miscellaneous Procedure (4/16/2002); IR Miscellaneous Procedure (7/11/2002); IR Miscellaneous Procedure (7/11/2002); IR Gastrostomy Tube Change (7/18/2002); IR Miscellaneous Procedure (7/30/2002); IR Miscellaneous Procedure (8/16/2002); IR Miscellaneous Procedure (8/31/2002); IR Miscellaneous Procedure (9/18/2002); IR Miscellaneous Procedure (3/1/2003); IR Miscellaneous Procedure (8/5/2003); IR Miscellaneous Procedure (1/29/2004); IR Miscellaneous Procedure (3/18/2004); IR Miscellaneous Procedure (7/21/2004); IR Miscellaneous Procedure (11/15/2004); IR Miscellaneous Procedure (2/18/2005); IR Miscellaneous Procedure (4/8/2005); IR Miscellaneous Procedure (6/30/2005); IR Miscellaneous Procedure (9/30/2005); IR Miscellaneous Procedure (12/20/2005); IR Miscellaneous Procedure (3/28/2006); IR Miscellaneous Procedure (6/30/2006); IR Miscellaneous Procedure (10/30/2006); IR Miscellaneous Procedure (2/28/2007); IR Miscellaneous Procedure (6/19/2007); IR Miscellaneous Procedure (9/25/2007); IR Miscellaneous Procedure (12/10/2007); IR Miscellaneous Procedure (1/5/2008); IR Miscellaneous Procedure (6/18/2008); IR Miscellaneous Procedure (8/25/2008); IR Miscellaneous Procedure (12/18/2008); IR Miscellaneous Procedure (2/18/2009); IR Miscellaneous Procedure (8/4/2009); IR Gastro Jejunostomy Tube Change (8/30/2009); IR Miscellaneous Procedure  (11/6/2009); IR Miscellaneous Procedure (12/28/2009); IR Miscellaneous Procedure (4/13/2010); IR Miscellaneous Procedure (6/29/2010); IR Miscellaneous Procedure (10/11/2010); IR Miscellaneous Procedure (1/3/2011); IR Miscellaneous Procedure (2/25/2011); IR Miscellaneous Procedure (5/26/2011); IR Miscellaneous Procedure (8/23/2011); IR Miscellaneous Procedure (11/17/2011); IR Miscellaneous Procedure (2/15/2012); IR Miscellaneous Procedure (5/18/2012); IR Miscellaneous Procedure (8/16/2012); IR Miscellaneous Procedure (10/25/2012); IR Miscellaneous Procedure (1/14/2013); IR Miscellaneous Procedure (4/19/2013); IR Miscellaneous Procedure (7/24/2013); IR Miscellaneous Procedure (10/24/2013); IR Miscellaneous Procedure (12/23/2013); IR Miscellaneous Procedure (4/3/2014); IR Miscellaneous Procedure (5/20/2014); IR Gastro Jejunostomy Tube Change (8/5/2014); IR Gastro Jejunostomy Tube Change (11/5/2014); IR Gastro Jejunostomy Tube Change (2/18/2015); IR Gastro Jejunostomy Tube Change (5/18/2015); IR Gastro Jejunostomy Tube Change (8/17/2015); IR Gastro Jejunostomy Tube Change (10/28/2015); IR Gastro Jejunostomy Tube Change (1/16/2016); IR Gastro Jejunostomy Tube Change (4/14/2016); IR Gastro Jejunostomy Tube Change (5/13/2016); IR Gastro Jejunostomy Tube Change (6/16/2016); IR Gastro Jejunostomy Tube Change (7/20/2016); IR Gastro Jejunostomy Tube Change (9/7/2016); IR Gastro Jejunostomy Tube Change (10/24/2016); IR Gastro Jejunostomy Tube Change (1/8/2017); IR Gastro Jejunostomy Tube Change (3/19/2017); IR Gastro Jejunostomy Tube Change (4/25/2017); IR Gastro Jejunostomy Tube Change (6/10/2017); IR Gastro Jejunostomy Tube Change (9/11/2017); IR Gastro Jejunostomy Tube Change (12/11/2017); IR Gastro Jejunostomy Tube Change (2/12/2018); IR Gastro Jejunostomy Tube Change (5/16/2018); IR Gastro Jejunostomy Tube Change (8/27/2018); IR Gastro Jejunostomy Tube Change (11/13/2018); IR Gastro Jejunostomy Tube Change (1/8/2019); IR  Gastro Jejunostomy Tube Change (2/6/2019); IR Gastro Jejunostomy Tube Change (4/29/2019); IR Gastro Jejunostomy Tube Change (6/25/2019); IR Gastro Jejunostomy Tube Change (9/24/2019); IR Gastro Jejunostomy Tube Change (1/29/2020); IR Gastro Jejunostomy Tube Change (5/6/2020); IR Gastro Jejunostomy Tube Change (8/3/2020); IR Gastro Jejunostomy Tube Change (11/10/2020); IR Gastro Jejunostomy Tube Change (2/11/2021); IR Gastro Jejunostomy Tube Change (5/17/2021); Spinal Fusion; Ir Gj Tube Replacement (11/13/2018); Ir Gj Tube Replacement (1/8/2019); Ir Gj Tube Replacement (2/6/2019); Ir Gj Tube Replacement (4/29/2019); Ir Gj Tube Replacement (6/25/2019); Ir Gj Tube Replacement (9/24/2019); back surgery; Amputate toe(s) (Right, 11/21/2019); Ir Gj Tube Replacement (1/29/2020); Ir Gj Tube Replacement (5/6/2020); Ir Gj Tube Replacement (8/3/2020); Ir Gj Tube Replacement (11/10/2020); Ir Gj Tube Replacement (2/11/2021); Ir Gj Tube Replacement (5/17/2021); IR Gastro Jejunostomy Tube Change (8/16/2021); IR Gastro Jejunostomy Tube Change (11/19/2021); IR Gastro Jejunostomy Tube Change (2/22/2022); IR Gastro Jejunostomy Tube Change (7/22/2022); IR Gastro Jejunostomy Tube Change (8/12/2022); IR Gastro Jejunostomy Tube Change (11/17/2022); IR Gastro Jejunostomy Tube Change (2/14/2023); IR Gastro Jejunostomy Tube Change (4/19/2023); IR Gastro Jejunostomy Tube Change (6/9/2023); IR Gastro Jejunostomy Tube Change (6/8/2023); IR Gastro Jejunostomy Tube Change (8/25/2023); IR Gastro Jejunostomy Tube Change (10/26/2023); IR Gastro Jejunostomy Tube Change (12/4/2023); IR Gastro Jejunostomy Tube Change (1/31/2024); IR Gastro Jejunostomy Tube Change (4/11/2024); and IR Gastro Jejunostomy Tube Change (5/18/2024).  Family and Social History:  Reviewed, and she  reports that she has never smoked. She has never used smokeless tobacco. She reports that she does not drink alcohol and does not use drugs.  Reviewed, and family history includes  "Seizure Disorder in her sister.    RECENT DIAGNOSTIC STUDIES:   Labs:  03/10/23  Lamotrigine 3.0 - 15.0 ug/mL 8.9     09/06/22  Lamotrigine 3.0 - 15.0 ug/mL 10.0   04/29/22  Keppra (Levetiracetam) Level 10 - 40 ug/mL 44 High       Topiramate 5.0 - 20.0 ug/mL 16.0     Imaging:   EXAM: CT HEAD WO CONTRAST  DATE/TIME: 11/18/2019 5:37 PM  INDICATION: Seizures  IMPRESSION:  1.  No CT finding of a mass, hemorrhage or focal area suggestive of acute infarct.  2.  Stable age related changes along with severe cerebellar atrophy.    EEG 11/18/19  Impression: This is an abnormal awake and drowsy EEG due to background slowing, most suggestive of severe compromised of brain function, due to encephalopathy or organic logan syndrome of non-specific etiology. No seizure activities detected during   study. Clinical correlation is recommended.      REVIEW OF SYSTEMS:                                                      10-point review of systems is negative except as mentioned above in HPI.    EXAM:                                                      Physical Exam:   Vitals: Ht 1.626 m (5' 4\")   Wt 47.6 kg (105 lb)   LMP  (LMP Unknown)   BMI 18.02 kg/m    BMI= Body mass index is 18.02 kg/m .  GENERAL: NAD.  HEENT: NC/AT.  PULM: Non-labored breathing.   Neurologic:  MENTAL STATUS: Alert.  Nonverbal.  Unable to follow commands.   MOTOR: Able to move all extremities  STATION AND GAIT: Wheelchair-bound     ASSESSMENT and PLAN:                                                      Assessment:    ICD-10-CM    1. Idiopathic generalized epilepsy, intractable (H)  G40.319 topiramate (TOPAMAX) 200 MG tablet     levETIRAcetam (KEPPRA) 100 MG/ML oral solution     lamoTRIgine (LAMICTAL) 25 MG chewable tablet     Topiramate Level     Lamotrigine Level     Keppra (Levetiracetam) Level        Elysia Arellano is a 58-year-old female with mental retardation, cerebral palsy, intractable epilepsy, resident of a group home who returns for yearly " follow-up for her seizure disorder.  She follows with Dr. Forman in the clinic.  Elysia has remained stable on current AED regimen of Lamictal, Keppra and Topamax.  I will reorder labs to monitor drug parameters.  We discussed interventions outlined below and will plan to see the patient back in 12 months.  Group home staff, [place], understand and agree with this plan     Plan:  --- Continue Lamictal- 75 mg twice daily  --- Continue Keppra-1000 mg twice daily  --- Continue Topamax 200 mg twice daily  --- Labs: Lamictal, Keppra and Topamax levels  --- Take your medications as prescribed, and do not stop taking abruptly.  --- Try to take your anti-seizure medications at the same time every day  --- Avoid common triggers: sleep deprivation, excessive alcohol, stress, flashing lights or patterns, dehydration, recreational drug use, illness and missed medications.  --- Plan on follow up in the Neurology Clinic in 12 months dr. Forman.  --- Please feel free to reach out if you have any further questions or concerns.  --- Seek immediate medical attention if an emergency arises or if your health becomes progressively worse.     It was a pleasure to see you today!     Total Time: Total time spent for face to face visit, reviewing labs/imaging studies, counseling and coordination of care was: 25 Minutes spent on the date of the encounter doing chart review, review of test results, patient visit, documentation and discussion with family     This note was dictated using voice recognition software.  Any grammatical or context distortions are unintentional and inherent to the software.    Onelia Petersen, DNP, APRN, CNP  [facility]

## 2024-05-18 ENCOUNTER — APPOINTMENT (OUTPATIENT)
Dept: INTERVENTIONAL RADIOLOGY/VASCULAR | Facility: HOSPITAL | Age: 59
End: 2024-05-18
Attending: RADIOLOGY
Payer: MEDICARE

## 2024-05-18 ENCOUNTER — HOSPITAL ENCOUNTER (EMERGENCY)
Facility: HOSPITAL | Age: 59
Discharge: HOME | End: 2024-05-18
Attending: EMERGENCY MEDICINE | Admitting: EMERGENCY MEDICINE
Payer: MEDICARE

## 2024-05-18 VITALS
BODY MASS INDEX: 18.02 KG/M2 | WEIGHT: 105 LBS | TEMPERATURE: 98.6 F | OXYGEN SATURATION: 98 % | HEART RATE: 118 BPM | RESPIRATION RATE: 16 BRPM

## 2024-05-18 DIAGNOSIS — K94.23 GASTROSTOMY TUBE DYSFUNCTION (H): ICD-10-CM

## 2024-05-18 PROCEDURE — 255N000002 HC RX 255 OP 636: Performed by: RADIOLOGY

## 2024-05-18 PROCEDURE — 99285 EMERGENCY DEPT VISIT HI MDM: CPT | Mod: 25

## 2024-05-18 PROCEDURE — 49452 REPLACE G-J TUBE PERC: CPT

## 2024-05-18 PROCEDURE — C1769 GUIDE WIRE: HCPCS

## 2024-05-18 RX ADMIN — IOHEXOL 30 ML: 350 INJECTION, SOLUTION INTRAVENOUS at 14:41

## 2024-05-18 ASSESSMENT — ACTIVITIES OF DAILY LIVING (ADL)
ADLS_ACUITY_SCORE: 39
ADLS_ACUITY_SCORE: 37

## 2024-05-18 ASSESSMENT — COLUMBIA-SUICIDE SEVERITY RATING SCALE - C-SSRS: IS THE PATIENT NOT ABLE TO COMPLETE C-SSRS: UNABLE TO VERBALIZE

## 2024-05-18 NOTE — ED NOTES
Nurse was getting ready to flush the g-tube with clog zapper. When attempting to flush with water nurse noticed water coming out a hole to the outside of the g-tube. ED Provider notified.

## 2024-05-18 NOTE — ED NOTES
Pt will be going to IR shortly for g-tube replacement. Nurse attempted to notify sister, co-guardian, voice mail left.

## 2024-05-18 NOTE — DISCHARGE INSTRUCTIONS
Elysia's G-tube was replaced today.  Follow-up with her primary care doctor and return to the emergency department for any worsening symptoms or other concerns.    Gastrostomy (G) or Gastrojejunostomy (G/J) Tube Exchange Discharge Instructions:   You had a gastrostomy (G) tube or a Gastrojejunostomy Tube (GJ) exchanged.  This tube is often used for nutritional support and medication administration or it can be used for stomach venting.     Care instructions:  - If you received sedation for your procedure, do not drive or operate heavy machinery for the rest of the day.  - Avoid soaking in stagnant water (tub baths, Jacuzzis, pools, lake, or ocean).   - You may shower beginning the day after the tube was exchanged.   - Clean under the disc daily with soap and water. Pat dry under disc and apply new split gauze dressing under disc. Cleaning tube site daily will help prevent infection and skin irritation.  - A small amount of clear tan drainage from the tube exit site can be normal.  - Make sure the disc on the tube fits slightly snug against the skin so that the tube does not move in or out of the body easily.   - If you experience leaking from around tube exit site, the most common cause is that the disc is not tightened against the skin.   - To tighten the disc, pull gently on the tube so the retention balloon (under the skin) is pulled up against the skin under the tube. Push the disc down until it is tight against the skin without a gap between the skin and the disc.  - Flush your tube with 60cc of water twice a day (using a cath tip syringe) to prevent tube from clogging (or follow recommendations from your doctor or dietician if given).    Giving Feedings and Medications:  - Follow-up with your dietician, primary care provider, or oncologist for instructions on tube feedings and medication administration.    - ONLY use specific enteric feedings with your tube (unless otherwise discussed with your dietitian).    -  Flush tube at least twice daily with 60ml of water using cath tip syringe unless otherwise instructed by your doctor or dietician.  - Flush the tube before and after administrating medications and bolus feedings with 60cc of water.    Follow Up:  - Recommend routine 3 month exchanges of feeding tube. Please contact your primary care provider or speak with your dietitian to obtain an order to have your G/GJ tube exchanged. Then contact [facility] scheduling department at [phone] to schedule your G/GJ tube exchange appointment.    Please seek medical evaluation for:  - Fever (greater than 101 F (38.3C).  - Purulent (yellow/green/foul smelling) drainage from tube exit site.   - Significant or worsening abdominal pain.   - Skin that is hot to the touch or significantly reddened at the tube exit site.   - Bleeding at tube exit site.    Call [facility] IR RN Line at [phone] with questions or if you have any of the following symptoms:  - Tube falls out or felt to be out of position.  - Unable to flush tube.  - Significant leakage around tube site (tube feeding, medications or drainage).  - Significant bleeding at the tube exit site.  - Severe pain at tube exit site.

## 2024-05-18 NOTE — ED TRIAGE NOTES
W/c bound, non verbal pt here with  staff who reports G tube clogged noted this morning.      Triage Assessment (Adult)       Row Name 05/18/24 1117          Respiratory WDL    Respiratory WDL WDL        Skin Circulation/Temperature WDL    Skin Circulation/Temperature WDL --  scratch marks on arms        Cardiac WDL    Cardiac WDL WDL        Peripheral/Neurovascular WDL    Peripheral Neurovascular WDL WDL        Cognitive/Neuro/Behavioral WDL    Cognitive/Neuro/Behavioral WDL --  non verbal hx CP        Nadja Coma Scale    Best Eye Response 4-->(E4) spontaneous     Best Motor Response 4-->(M4) withdraws from pain     Best Verbal Response 1-->(V1) none     Nadja Coma Scale Score 9

## 2024-05-18 NOTE — ED PROVIDER NOTES
EMERGENCY DEPARTMENT ENCOUnter      NAME: Elysia Arellano  AGE: 58 year old female  YOB: 1965  MRN: [id number]  EVALUATION DATE & TIME: 5/18/2024 11:21 AM    PCP: Fabricio Aguirre    ED PROVIDER: Domenico Up DO      Chief Complaint   Patient presents with    Gtube Problem         FINAL IMPRESSION:  1. Gastrostomy tube dysfunction (H)          ED COURSE & MEDICAL DECISION MAKING:      The patient was brought to the ER today after her G-tube was found to be nonfunctional.  Upon examination, she has a crack in the G-tube.  Case was discussed with interventional radiology and they will plan to take her to the IR suite later today for tube replacement.      The patient presented to the emergency department today after her G-tube became nonfunctional.  She was found to have a crack in it on exam.  Her case was discussed with interventional radiology and they were able to take her to the IR suite for tube replacement.  She tolerated this well will be discharged back to her facility.      Medical Decision Making  Obtained supplemental history:Supplemental history obtained?: Documented in chart and Other: Facility staff member  Reviewed external records: External records reviewed?: No  Care impacted by chronic illness:Other: CP  Care significantly affected by social determinants of health:N/A  Did you consider but not order tests?: Work up considered but not performed and documented in chart, if applicable  Did you interpret images independently?: Independent interpretation of ECG and images noted in documentation, when applicable.  Consultation discussion with other provider:Did you involve another provider (consultant, MH, pharmacy, etc.)?: I discussed the care with another health care provider, see documentation for details.  Discharge. No recommendations on prescription strength medication(s). See documentation for any additional details.    At the conclusion of the encounter I discussed the results of  all of the tests and the disposition. The questions were answered. The patient or family acknowledged understanding and was agreeable with the care plan.       =================================================================    HPI        Elysia Arellano is a 58 year old female with a pertinent history of cerebral palsy who presents to the emergency department today due to problems with her G-tube.  At her facility her G-tube was found to be nonfunctional today.  They have no other current concerns.  She only feeds through her G-tube.  She is nonverbal and not able to provide any further history or follow commands.          PAST MEDICAL HISTORY:  Past Medical History:   Diagnosis Date    Cerebral palsy (H)     Depression     GERD (gastroesophageal reflux disease)     Hyperopia     Mental retardation     Osteoporosis     Pneumonia due to 2019 novel coronavirus 8/7/2020    Pyelonephritis     Scoliosis     Seizure disorder (H)     UTI (urinary tract infection)        PAST SURGICAL HISTORY:  Past Surgical History:   Procedure Laterality Date    AMPUTATE TOE(S) Right 11/21/2019    Procedure: AMPUTATION, TOE fifth toe right foot;  Surgeon: Wade Garcia DPM;  Location: [facility] OR;  Service: Podiatry    BACK SURGERY      IR ABSCESS TUBE CHANGE  6/27/2000    IR ABSCESS TUBE CHANGE  9/5/2000    IR ABSCESS TUBE CHANGE  3/15/2001    IR ABSCESS TUBE CHANGE  7/6/2001    IR ABSCESS TUBE CHANGE  9/4/2001    IR GASTRO JEJUNOSTOMY TUBE CHANGE  8/30/2009    IR GASTRO JEJUNOSTOMY TUBE CHANGE  8/5/2014    IR GASTRO JEJUNOSTOMY TUBE CHANGE  11/5/2014    IR GASTRO JEJUNOSTOMY TUBE CHANGE  2/18/2015    IR GASTRO JEJUNOSTOMY TUBE CHANGE  5/18/2015    IR GASTRO JEJUNOSTOMY TUBE CHANGE  8/17/2015    IR GASTRO JEJUNOSTOMY TUBE CHANGE  10/28/2015    IR GASTRO JEJUNOSTOMY TUBE CHANGE  1/16/2016    IR GASTRO JEJUNOSTOMY TUBE CHANGE  4/14/2016    IR GASTRO JEJUNOSTOMY TUBE CHANGE  5/13/2016    IR GASTRO JEJUNOSTOMY TUBE CHANGE   6/16/2016    IR GASTRO JEJUNOSTOMY TUBE CHANGE  7/20/2016    IR GASTRO JEJUNOSTOMY TUBE CHANGE  9/7/2016    IR GASTRO JEJUNOSTOMY TUBE CHANGE  10/24/2016    IR GASTRO JEJUNOSTOMY TUBE CHANGE  1/8/2017    IR GASTRO JEJUNOSTOMY TUBE CHANGE  3/19/2017    IR GASTRO JEJUNOSTOMY TUBE CHANGE  4/25/2017    IR GASTRO JEJUNOSTOMY TUBE CHANGE  6/10/2017    IR GASTRO JEJUNOSTOMY TUBE CHANGE  9/11/2017    IR GASTRO JEJUNOSTOMY TUBE CHANGE  12/11/2017    IR GASTRO JEJUNOSTOMY TUBE CHANGE  2/12/2018    IR GASTRO JEJUNOSTOMY TUBE CHANGE  5/16/2018    IR GASTRO JEJUNOSTOMY TUBE CHANGE  8/27/2018    IR GASTRO JEJUNOSTOMY TUBE CHANGE  11/13/2018    IR GASTRO JEJUNOSTOMY TUBE CHANGE  1/8/2019    IR GASTRO JEJUNOSTOMY TUBE CHANGE  2/6/2019    IR GASTRO JEJUNOSTOMY TUBE CHANGE  4/29/2019    IR GASTRO JEJUNOSTOMY TUBE CHANGE  6/25/2019    IR GASTRO JEJUNOSTOMY TUBE CHANGE  9/24/2019    IR GASTRO JEJUNOSTOMY TUBE CHANGE  1/29/2020    IR GASTRO JEJUNOSTOMY TUBE CHANGE  5/6/2020    IR GASTRO JEJUNOSTOMY TUBE CHANGE  8/3/2020    IR GASTRO JEJUNOSTOMY TUBE CHANGE  11/10/2020    IR GASTRO JEJUNOSTOMY TUBE CHANGE  2/11/2021    IR GASTRO JEJUNOSTOMY TUBE CHANGE  5/17/2021    IR GASTRO JEJUNOSTOMY TUBE CHANGE  8/16/2021    IR GASTRO JEJUNOSTOMY TUBE CHANGE  11/19/2021    IR GASTRO JEJUNOSTOMY TUBE CHANGE  2/22/2022    IR GASTRO JEJUNOSTOMY TUBE CHANGE  7/22/2022    IR GASTRO JEJUNOSTOMY TUBE CHANGE  8/12/2022    IR GASTRO JEJUNOSTOMY TUBE CHANGE  11/17/2022    IR GASTRO JEJUNOSTOMY TUBE CHANGE  2/14/2023    IR GASTRO JEJUNOSTOMY TUBE CHANGE  4/19/2023    IR GASTRO JEJUNOSTOMY TUBE CHANGE  6/9/2023    IR GASTRO JEJUNOSTOMY TUBE CHANGE  6/8/2023    IR GASTRO JEJUNOSTOMY TUBE CHANGE  8/25/2023    IR GASTRO JEJUNOSTOMY TUBE CHANGE  10/26/2023    IR GASTRO JEJUNOSTOMY TUBE CHANGE  12/4/2023    IR GASTRO JEJUNOSTOMY TUBE CHANGE  1/31/2024    IR GASTRO JEJUNOSTOMY TUBE CHANGE  4/11/2024    IR GASTRO JEJUNOSTOMY TUBE CHANGE  5/18/2024    IR GASTRO  JEJUNOSTOMY TUBE PLACEMENT      IR GASTROSTOMY TUBE CHANGE  7/18/2002    IR GJ TUBE REPLACEMENT  11/13/2018    IR GJ TUBE REPLACEMENT  1/8/2019    IR GJ TUBE REPLACEMENT  2/6/2019    IR GJ TUBE REPLACEMENT  4/29/2019    IR GJ TUBE REPLACEMENT  6/25/2019    IR GJ TUBE REPLACEMENT  9/24/2019    IR GJ TUBE REPLACEMENT  1/29/2020    IR GJ TUBE REPLACEMENT  5/6/2020    IR GJ TUBE REPLACEMENT  8/3/2020    IR GJ TUBE REPLACEMENT  11/10/2020    IR GJ TUBE REPLACEMENT  2/11/2021    IR GJ TUBE REPLACEMENT  5/17/2021    IR MISCELLANEOUS PROCEDURE  6/19/2000    IR MISCELLANEOUS PROCEDURE  12/22/2001    IR MISCELLANEOUS PROCEDURE  1/7/2002    IR MISCELLANEOUS PROCEDURE  1/19/2002    IR MISCELLANEOUS PROCEDURE  1/19/2002    IR MISCELLANEOUS PROCEDURE  2/20/2002    IR MISCELLANEOUS PROCEDURE  2/20/2002    IR MISCELLANEOUS PROCEDURE  3/19/2002    IR MISCELLANEOUS PROCEDURE  4/16/2002    IR MISCELLANEOUS PROCEDURE  7/11/2002    IR MISCELLANEOUS PROCEDURE  7/11/2002    IR MISCELLANEOUS PROCEDURE  7/30/2002    IR MISCELLANEOUS PROCEDURE  8/16/2002    IR MISCELLANEOUS PROCEDURE  8/31/2002    IR MISCELLANEOUS PROCEDURE  9/18/2002    IR MISCELLANEOUS PROCEDURE  3/1/2003    IR MISCELLANEOUS PROCEDURE  8/5/2003    IR MISCELLANEOUS PROCEDURE  1/29/2004    IR MISCELLANEOUS PROCEDURE  3/18/2004    IR MISCELLANEOUS PROCEDURE  7/21/2004    IR MISCELLANEOUS PROCEDURE  11/15/2004    IR MISCELLANEOUS PROCEDURE  2/18/2005    IR MISCELLANEOUS PROCEDURE  4/8/2005    IR MISCELLANEOUS PROCEDURE  6/30/2005    IR MISCELLANEOUS PROCEDURE  9/30/2005    IR MISCELLANEOUS PROCEDURE  12/20/2005    IR MISCELLANEOUS PROCEDURE  3/28/2006    IR MISCELLANEOUS PROCEDURE  6/30/2006    IR MISCELLANEOUS PROCEDURE  10/30/2006    IR MISCELLANEOUS PROCEDURE  2/28/2007    IR MISCELLANEOUS PROCEDURE  6/19/2007    IR MISCELLANEOUS PROCEDURE  9/25/2007    IR MISCELLANEOUS PROCEDURE  12/10/2007    IR MISCELLANEOUS PROCEDURE  1/5/2008    IR MISCELLANEOUS PROCEDURE  6/18/2008     IR MISCELLANEOUS PROCEDURE  8/25/2008    IR MISCELLANEOUS PROCEDURE  12/18/2008    IR MISCELLANEOUS PROCEDURE  2/18/2009    IR MISCELLANEOUS PROCEDURE  8/4/2009    IR MISCELLANEOUS PROCEDURE  11/6/2009    IR MISCELLANEOUS PROCEDURE  12/28/2009    IR MISCELLANEOUS PROCEDURE  4/13/2010    IR MISCELLANEOUS PROCEDURE  6/29/2010    IR MISCELLANEOUS PROCEDURE  10/11/2010    IR MISCELLANEOUS PROCEDURE  1/3/2011    IR MISCELLANEOUS PROCEDURE  2/25/2011    IR MISCELLANEOUS PROCEDURE  5/26/2011    IR MISCELLANEOUS PROCEDURE  8/23/2011    IR MISCELLANEOUS PROCEDURE  11/17/2011    IR MISCELLANEOUS PROCEDURE  2/15/2012    IR MISCELLANEOUS PROCEDURE  5/18/2012    IR MISCELLANEOUS PROCEDURE  8/16/2012    IR MISCELLANEOUS PROCEDURE  10/25/2012    IR MISCELLANEOUS PROCEDURE  1/14/2013    IR MISCELLANEOUS PROCEDURE  4/19/2013    IR MISCELLANEOUS PROCEDURE  7/24/2013    IR MISCELLANEOUS PROCEDURE  10/24/2013    IR MISCELLANEOUS PROCEDURE  12/23/2013    IR MISCELLANEOUS PROCEDURE  4/3/2014    IR MISCELLANEOUS PROCEDURE  5/20/2014    Scoliosis S/P Lewis Valentín Placement      SPINAL FUSION             CURRENT MEDICATIONS:    acetaminophen (TYLENOL) 325 MG tablet  arginine (ARGINAID) PACK  bisacodyl (DULCOLAX) 10 MG suppository  calcium carbonate (OS-NATHALIA) 500 MG TABS  carboxymethylcellulose PF (REFRESH PLUS) 0.5 % SOLN ophthalmic solution  cefuroxime (CEFTIN) 500 MG tablet  desonide (DESOWEN) 0.05 % external cream  Dextromethorphan-guaiFENesin  MG/5ML syrup  diazepam (VALIUM) 5 MG/ML (HIGH CONC) solution  diphenhydrAMINE (BENADRYL) 12.5 MG/5ML solution  folic acid (FOLVITE) 1 MG tablet  lamoTRIgine (LAMICTAL) 200 MG TBDP ODT tab  lamoTRIgine (LAMICTAL) 25 MG chewable tablet  levETIRAcetam (KEPPRA) 100 MG/ML oral solution  levothyroxine (SYNTHROID/LEVOTHROID) 50 MCG tablet  multivitamin w/minerals (THERA-VIT-M) tablet  nystatin (MYCOSTATIN) 418516 UNIT/GM external cream  omeprazole (PRILOSEC) 40 MG DR capsule  protein modular,  BENEPROTEIN, PACK  Psyllium (GENFIBER PO)  Sodium Phosphates (FLEET ENEMA RE)  SOLUBLE FIBER THERAPY powder  topiramate (TOPAMAX) 200 MG tablet  VITAMIN D (CHOLECALCIFEROL) PO  Water For Injection Sterile (STERILE WATER, PRESERVATIVE FREE,) injection        ALLERGIES:  Allergies   Allergen Reactions    Vancomycin Hives, Itching and Rash    Ciprofloxacin Rash     Other reaction(s): *Unknown    Aspirin Buf(Cacarb-Mgcarb-Mgo)      Other reaction(s): Unknown    Lanolin      Other reaction(s): *Unknown    Nsaids      Other reaction(s): Unknown    Amoxicillin-Pot Clavulanate Rash     10/26/23 given ceftriaxone at [facility] ED    Ibuprofen Rash     Other reaction(s): *Unknown    Salicylates Rash     Other reaction(s): Unknown       FAMILY HISTORY:  Family History   Problem Relation Age of Onset    Seizure Disorder Sister        SOCIAL HISTORY:   Social History     Socioeconomic History    Marital status: Single   Tobacco Use    Smoking status: Never    Smokeless tobacco: Never   Substance and Sexual Activity    Alcohol use: No    Drug use: No    Sexual activity: Not Currently     Birth control/protection: Post-menopausal       VITALS:  No data found.      PHYSICAL EXAM    Constitutional:  Well developed, Well nourished,  HENT:  Normocephalic, Atraumatic, Oropharynx moist, Nose normal.   Eyes:  EOMI, Conjunctiva normal, No discharge.   Respiratory:  Normal breath sounds, No respiratory distress, No wheezing, No chest tenderness.   Cardiovascular: Tachycardic normal rhythm, No murmurs  GI:  Soft, No tenderness, No guarding, No CVA tenderness.  G-tube in place  Musculoskeletal: Extremity contractures bilaterally  Extremities: No lower extremity edema.  Neurologic:  Awake, No acute deficits noted.          Domenico Up DO  Emergency Medicine  [facility] EMERGENCY DEPARTMENT  [address] [place] [id number]  [phone]  Dept: [phone]     Domenico Up DO  05/22/24 [id number]

## 2024-05-20 ENCOUNTER — OFFICE VISIT (OUTPATIENT)
Dept: NEUROLOGY | Facility: CLINIC | Age: 59
End: 2024-05-20
Payer: MEDICARE

## 2024-05-20 VITALS — BODY MASS INDEX: 17.93 KG/M2 | WEIGHT: 105 LBS | HEIGHT: 64 IN

## 2024-05-20 DIAGNOSIS — G40.319 IDIOPATHIC GENERALIZED EPILEPSY, INTRACTABLE (H): ICD-10-CM

## 2024-05-20 PROCEDURE — 99213 OFFICE O/P EST LOW 20 MIN: CPT

## 2024-05-20 RX ORDER — LEVETIRACETAM 100 MG/ML
SOLUTION ORAL
Qty: 600 ML | Refills: 11 | Status: SHIPPED | OUTPATIENT
Start: 2024-05-20

## 2024-05-20 RX ORDER — TOPIRAMATE 200 MG/1
TABLET, FILM COATED ORAL
Qty: 186 TABLET | Refills: 3 | Status: SHIPPED | OUTPATIENT
Start: 2024-05-20

## 2024-05-20 RX ORDER — LAMOTRIGINE 25 MG/1
75 TABLET, CHEWABLE ORAL 2 TIMES DAILY
Qty: 540 TABLET | Refills: 3 | Status: SHIPPED | OUTPATIENT
Start: 2024-05-20

## 2024-05-20 NOTE — NURSING NOTE
Chief Complaint   Patient presents with    Seizures     Aid states that patient is having seizures.     Yudith Mena on 5/20/2024 at 9:55 AM

## 2024-05-20 NOTE — LETTER
5/20/2024         RE: Elysia Arellano  [address] [facility] [address]        Dear Colleague,    Thank you for referring your patient, Elysia Arellano, to the [facility] [place]. Please see a copy of my visit note below.      __________________________________  ESTABLISHED PATIENT NEUROLOGY NOTE    DATE OF VISIT: 4/24/2023  MRN: [id number]  PATIENT NAME: Elysia Arellano  YOB: 1965    Chief Complaint   Patient presents with     Seizures     Aid states that patient is having seizures.     SUBJECTIVE:                                                      HISTORY OF PRESENT ILLNESS:  Elysia is here for follow up regarding seizures    Elysia Arellano is a 57-year-old female with mental retardation, cerebral palsy, intractable epilepsy, resident of a group home who returns for yearly follow-up for her seizure disorder.  She follows with Dr. Forman in the clinic last seen 4/27/2022.  Per chart review, she had remained seizure-free.  She was continued on Keppra, lamotrigine and Topamax and I had recommended checking blood levels that were not done.  Recently lamotrigine level was done that was within normal limits but Topamax and Keppra level were done but a year ago and was therapeutic.  According to caregiver there has been no significant change.  She has not experienced any seizures.  She is wheelchair-bound and minimally communicative.    05/24/23: Elysia presents to the clinic for annual epilepsy follow-up.  She is accompanied by, Tamie, her group home nurse. Elysia is nonverbal and Tamie speaks on her behalf.  He states that she has remained seizure-free.  He denies any loss of consciousness or seizure-like activity.  He reports that she is taking her medications as prescribed and tolerating well.  She is also taking vitamin D and calcium.  No other concerns at this time    05/20/24: Elysia presents to the clinic for her annual seizure follow up. She is  accompanied by Srinivas who is a .  Srinivas tells me that he has worked with Elysia for 7 years.  He reports that she has 5 to 10-second seizures once or twice per week for as long as he has worked with her.  He describes this as her moaning out and stretching her arms above her head.  He denies any tonic-clonic movement or loss of consciousness.  She continues to take her medications as prescribed and tolerates well with out any known adverse effects.  No other questions or concerns today     Current Anti-Seizure Medications:    Lamictal-75 mg twice daily  Keppra-1000 mg twice daily  Topamax 200 mg twice daily    Perceived AED Side Effects: No    Medication Notes:   AED Medication Compliance:  compliant administered by nurse at care home through PEG tube     Psycho-Social History: Elysia Arellano currently lives [facility] group Home. Employment status: Stopped going to [employer] during pandemic    Patient does not smoke, no alcohol use, no recreational drug use..      Current Medications:   Current Outpatient Medications   Medication Sig Dispense Refill     acetaminophen (TYLENOL) 325 MG tablet Take 650 mg by mouth every 4 hours as needed for mild pain As needed for pain, discomfort, and fever.  Not to exceed 3000 mg in 24 hours.       arginine (ARGINAID) PACK Take 1 packet by mouth 2 times daily Per G-tube mixed with 4 ounces of water       bisacodyl (DULCOLAX) 10 MG suppository Place 10 mg rectally every other day       calcium carbonate (OS-NATHALIA) 500 MG TABS 1 tablet by Per Feeding Tube route 2 times daily crushed       carboxymethylcellulose PF (REFRESH PLUS) 0.5 % SOLN ophthalmic solution 1 drop At Bedtime       cefuroxime (CEFTIN) 500 MG tablet 1 tablet (500 mg) by Per Feeding Tube route every 12 hours 8 tablet 0     desonide (DESOWEN) 0.05 % external cream Apply topically 2 times daily .  Hold if no red, itchy, scaly areas on face.       Dextromethorphan-guaiFENesin  MG/5ML syrup Take 10 mLs  by mouth every 6 hours as needed for cough       diazepam (VALIUM) 5 MG/ML (HIGH CONC) solution Take 5 mg by mouth every 8 hours as needed for anxiety       diphenhydrAMINE (BENADRYL) 12.5 MG/5ML solution Take 25 mg by mouth every 4 hours as needed for sleep        folic acid (FOLVITE) 1 MG tablet Take 1 mg by mouth daily Per G- tube       lamoTRIgine (LAMICTAL) 200 MG TBDP ODT tab DISSOLVE 1 TABLET IN 5-10ML OF WATER AND TAKE PER G-TUBE TWICE DAILY ALONG WITH 75MG FOR TOTAL DOSE 275MG 186 tablet 3     lamoTRIgine (LAMICTAL) 25 MG chewable tablet 3 tablets (75 mg) by Per G Tube route 2 times daily 540 tablet 3     levETIRAcetam (KEPPRA) 100 MG/ML oral solution GIVE 10ML (1000MG) PER G-TUBE TWICE DAILY 600 mL 11     levothyroxine (SYNTHROID/LEVOTHROID) 50 MCG tablet Take 50 mcg by mouth daily Per G-tube       multivitamin w/minerals (THERA-VIT-M) tablet Take 1 tablet by mouth daily Per G-tube       nystatin (MYCOSTATIN) 077497 UNIT/GM external cream Apply topically 3 times daily as needed for other (TID To groin, and two times daily to G-tube site) 60 g 0     omeprazole (PRILOSEC) 40 MG DR capsule 20 mg daily Per g tube       protein modular, BENEPROTEIN, PACK 7 g by Per Feeding Tube route 2 times daily       Psyllium (GENFIBER PO) Take 2 Tablespoonful by mouth daily Per G-tube with 4 ounces of water flush       Sodium Phosphates (FLEET ENEMA RE) Place rectally every other day If no results from suppository       SOLUBLE FIBER THERAPY powder Mix one tablespoonful in liquid and give per G-tube once daily       topiramate (TOPAMAX) 200 MG tablet TAKE 1 TABLET PER G-TUBE TWICE DAILY 186 tablet 3     VITAMIN D (CHOLECALCIFEROL) PO Take 400 Units by mouth daily 2.5ml qd       Water For Injection Sterile (STERILE WATER, PRESERVATIVE FREE,) injection 4 times daily 300cc per j- tube       No current facility-administered medications for this visit.     Past Medical History:   Patient  has a past medical history of Cerebral  palsy (H), Depression, GERD (gastroesophageal reflux disease), Hyperopia, Mental retardation, Osteoporosis, Pneumonia due to 2019 novel coronavirus (8/7/2020), Pyelonephritis, Scoliosis, Seizure disorder (H), and UTI (urinary tract infection).  Surgical History:  She  has a past surgical history that includes IR Gastro Jejunostomy Tube Placement; Scoliosis S/P Lewis Valentín Placement; IR Miscellaneous Procedure (6/19/2000); IR Abscess Tube Change (6/27/2000); IR Abscess Tube Change (9/5/2000); IR Abscess Tube Change (3/15/2001); IR Abscess Tube Change (7/6/2001); IR Abscess Tube Change (9/4/2001); IR Miscellaneous Procedure (12/22/2001); IR Miscellaneous Procedure (1/7/2002); IR Miscellaneous Procedure (1/19/2002); IR Miscellaneous Procedure (1/19/2002); IR Miscellaneous Procedure (2/20/2002); IR Miscellaneous Procedure (2/20/2002); IR Miscellaneous Procedure (3/19/2002); IR Miscellaneous Procedure (4/16/2002); IR Miscellaneous Procedure (7/11/2002); IR Miscellaneous Procedure (7/11/2002); IR Gastrostomy Tube Change (7/18/2002); IR Miscellaneous Procedure (7/30/2002); IR Miscellaneous Procedure (8/16/2002); IR Miscellaneous Procedure (8/31/2002); IR Miscellaneous Procedure (9/18/2002); IR Miscellaneous Procedure (3/1/2003); IR Miscellaneous Procedure (8/5/2003); IR Miscellaneous Procedure (1/29/2004); IR Miscellaneous Procedure (3/18/2004); IR Miscellaneous Procedure (7/21/2004); IR Miscellaneous Procedure (11/15/2004); IR Miscellaneous Procedure (2/18/2005); IR Miscellaneous Procedure (4/8/2005); IR Miscellaneous Procedure (6/30/2005); IR Miscellaneous Procedure (9/30/2005); IR Miscellaneous Procedure (12/20/2005); IR Miscellaneous Procedure (3/28/2006); IR Miscellaneous Procedure (6/30/2006); IR Miscellaneous Procedure (10/30/2006); IR Miscellaneous Procedure (2/28/2007); IR Miscellaneous Procedure (6/19/2007); IR Miscellaneous Procedure (9/25/2007); IR Miscellaneous Procedure (12/10/2007); IR Miscellaneous  Procedure (1/5/2008); IR Miscellaneous Procedure (6/18/2008); IR Miscellaneous Procedure (8/25/2008); IR Miscellaneous Procedure (12/18/2008); IR Miscellaneous Procedure (2/18/2009); IR Miscellaneous Procedure (8/4/2009); IR Gastro Jejunostomy Tube Change (8/30/2009); IR Miscellaneous Procedure (11/6/2009); IR Miscellaneous Procedure (12/28/2009); IR Miscellaneous Procedure (4/13/2010); IR Miscellaneous Procedure (6/29/2010); IR Miscellaneous Procedure (10/11/2010); IR Miscellaneous Procedure (1/3/2011); IR Miscellaneous Procedure (2/25/2011); IR Miscellaneous Procedure (5/26/2011); IR Miscellaneous Procedure (8/23/2011); IR Miscellaneous Procedure (11/17/2011); IR Miscellaneous Procedure (2/15/2012); IR Miscellaneous Procedure (5/18/2012); IR Miscellaneous Procedure (8/16/2012); IR Miscellaneous Procedure (10/25/2012); IR Miscellaneous Procedure (1/14/2013); IR Miscellaneous Procedure (4/19/2013); IR Miscellaneous Procedure (7/24/2013); IR Miscellaneous Procedure (10/24/2013); IR Miscellaneous Procedure (12/23/2013); IR Miscellaneous Procedure (4/3/2014); IR Miscellaneous Procedure (5/20/2014); IR Gastro Jejunostomy Tube Change (8/5/2014); IR Gastro Jejunostomy Tube Change (11/5/2014); IR Gastro Jejunostomy Tube Change (2/18/2015); IR Gastro Jejunostomy Tube Change (5/18/2015); IR Gastro Jejunostomy Tube Change (8/17/2015); IR Gastro Jejunostomy Tube Change (10/28/2015); IR Gastro Jejunostomy Tube Change (1/16/2016); IR Gastro Jejunostomy Tube Change (4/14/2016); IR Gastro Jejunostomy Tube Change (5/13/2016); IR Gastro Jejunostomy Tube Change (6/16/2016); IR Gastro Jejunostomy Tube Change (7/20/2016); IR Gastro Jejunostomy Tube Change (9/7/2016); IR Gastro Jejunostomy Tube Change (10/24/2016); IR Gastro Jejunostomy Tube Change (1/8/2017); IR Gastro Jejunostomy Tube Change (3/19/2017); IR Gastro Jejunostomy Tube Change (4/25/2017); IR Gastro Jejunostomy Tube Change (6/10/2017); IR Gastro Jejunostomy Tube Change  (9/11/2017); IR Gastro Jejunostomy Tube Change (12/11/2017); IR Gastro Jejunostomy Tube Change (2/12/2018); IR Gastro Jejunostomy Tube Change (5/16/2018); IR Gastro Jejunostomy Tube Change (8/27/2018); IR Gastro Jejunostomy Tube Change (11/13/2018); IR Gastro Jejunostomy Tube Change (1/8/2019); IR Gastro Jejunostomy Tube Change (2/6/2019); IR Gastro Jejunostomy Tube Change (4/29/2019); IR Gastro Jejunostomy Tube Change (6/25/2019); IR Gastro Jejunostomy Tube Change (9/24/2019); IR Gastro Jejunostomy Tube Change (1/29/2020); IR Gastro Jejunostomy Tube Change (5/6/2020); IR Gastro Jejunostomy Tube Change (8/3/2020); IR Gastro Jejunostomy Tube Change (11/10/2020); IR Gastro Jejunostomy Tube Change (2/11/2021); IR Gastro Jejunostomy Tube Change (5/17/2021); Spinal Fusion; Ir Gj Tube Replacement (11/13/2018); Ir Gj Tube Replacement (1/8/2019); Ir Gj Tube Replacement (2/6/2019); Ir Gj Tube Replacement (4/29/2019); Ir Gj Tube Replacement (6/25/2019); Ir Gj Tube Replacement (9/24/2019); back surgery; Amputate toe(s) (Right, 11/21/2019); Ir Gj Tube Replacement (1/29/2020); Ir Gj Tube Replacement (5/6/2020); Ir Gj Tube Replacement (8/3/2020); Ir Gj Tube Replacement (11/10/2020); Ir Gj Tube Replacement (2/11/2021); Ir Gj Tube Replacement (5/17/2021); IR Gastro Jejunostomy Tube Change (8/16/2021); IR Gastro Jejunostomy Tube Change (11/19/2021); IR Gastro Jejunostomy Tube Change (2/22/2022); IR Gastro Jejunostomy Tube Change (7/22/2022); IR Gastro Jejunostomy Tube Change (8/12/2022); IR Gastro Jejunostomy Tube Change (11/17/2022); IR Gastro Jejunostomy Tube Change (2/14/2023); IR Gastro Jejunostomy Tube Change (4/19/2023); IR Gastro Jejunostomy Tube Change (6/9/2023); IR Gastro Jejunostomy Tube Change (6/8/2023); IR Gastro Jejunostomy Tube Change (8/25/2023); IR Gastro Jejunostomy Tube Change (10/26/2023); IR Gastro Jejunostomy Tube Change (12/4/2023); IR Gastro Jejunostomy Tube Change (1/31/2024); IR Gastro Jejunostomy Tube  "Change (4/11/2024); and IR Gastro Jejunostomy Tube Change (5/18/2024).  Family and Social History:  Reviewed, and she  reports that she has never smoked. She has never used smokeless tobacco. She reports that she does not drink alcohol and does not use drugs.  Reviewed, and family history includes Seizure Disorder in her sister.    RECENT DIAGNOSTIC STUDIES:   Labs:  03/10/23  Lamotrigine 3.0 - 15.0 ug/mL 8.9     09/06/22  Lamotrigine 3.0 - 15.0 ug/mL 10.0   04/29/22  Keppra (Levetiracetam) Level 10 - 40 ug/mL 44 High       Topiramate 5.0 - 20.0 ug/mL 16.0     Imaging:   EXAM: CT HEAD WO CONTRAST  DATE/TIME: 11/18/2019 5:37 PM  INDICATION: Seizures  IMPRESSION:  1.  No CT finding of a mass, hemorrhage or focal area suggestive of acute infarct.  2.  Stable age related changes along with severe cerebellar atrophy.    EEG 11/18/19  Impression: This is an abnormal awake and drowsy EEG due to background slowing, most suggestive of severe compromised of brain function, due to encephalopathy or organic logan syndrome of non-specific etiology. No seizure activities detected during   study. Clinical correlation is recommended.      REVIEW OF SYSTEMS:                                                      10-point review of systems is negative except as mentioned above in HPI.    EXAM:                                                      Physical Exam:   Vitals: Ht 1.626 m (5' 4\")   Wt 47.6 kg (105 lb)   LMP  (LMP Unknown)   BMI 18.02 kg/m    BMI= Body mass index is 18.02 kg/m .  GENERAL: NAD.  HEENT: NC/AT.  PULM: Non-labored breathing.   Neurologic:  MENTAL STATUS: Alert.  Nonverbal.  Unable to follow commands.   MOTOR: Able to move all extremities  STATION AND GAIT: Wheelchair-bound     ASSESSMENT and PLAN:                                                      Assessment:    ICD-10-CM    1. Idiopathic generalized epilepsy, intractable (H)  G40.319 topiramate (TOPAMAX) 200 MG tablet     levETIRAcetam (KEPPRA) 100 MG/ML oral " solution     lamoTRIgine (LAMICTAL) 25 MG chewable tablet     Topiramate Level     Lamotrigine Level     Keppra (Levetiracetam) Level        Elysia Arellano is a 58-year-old female with mental retardation, cerebral palsy, intractable epilepsy, resident of a group home who returns for yearly follow-up for her seizure disorder.  She follows with Dr. Forman in the clinic.  Elysia has remained stable on current AED regimen of Lamictal, Keppra and Topamax.  I will reorder labs to monitor drug parameters.  We discussed interventions outlined below and will plan to see the patient back in 12 months.  [facility] staff, Srinivas, understand and agree with this plan     Plan:  --- Continue Lamictal- 75 mg twice daily  --- Continue Keppra-1000 mg twice daily  --- Continue Topamax 200 mg twice daily  --- Labs: Lamictal, Keppra and Topamax levels  --- Take your medications as prescribed, and do not stop taking abruptly.  --- Try to take your anti-seizure medications at the same time every day  --- Avoid common triggers: sleep deprivation, excessive alcohol, stress, flashing lights or patterns, dehydration, recreational drug use, illness and missed medications.  --- Plan on follow up in the Neurology Clinic in 12 months dr. Forman.  --- Please feel free to reach out if you have any further questions or concerns.  --- Seek immediate medical attention if an emergency arises or if your health becomes progressively worse.     It was a pleasure to see you today!     Total Time: Total time spent for face to face visit, reviewing labs/imaging studies, counseling and coordination of care was: 25 Minutes spent on the date of the encounter doing chart review, review of test results, patient visit, documentation and discussion with family     This note was dictated using voice recognition software.  Any grammatical or context distortions are unintentional and inherent to the software.    Onelia Petersen, DNP, APRN, CNP  [facility]                        Again, thank you for allowing me to participate in the care of your patient.        Sincerely,        TESFAYE Sharpe CNP

## 2024-05-20 NOTE — PATIENT INSTRUCTIONS
Plan:  --- Continue Lamictal-75 mg twice daily  --- Continue Keppra-1000 mg twice daily  --- Continue Topamax 200 mg twice daily  --- Labs: Lamictal, Keppra and Topamax levels  --- Take your medications as prescribed, and do not stop taking abruptly.  --- Try to take your anti-seizure medications at the same time every day  --- Avoid common triggers: sleep deprivation, excessive alcohol, stress, flashing lights or patterns, dehydration, recreational drug use, illness and missed medications.  --- Plan on follow up in the Neurology Clinic in 12 months dr. Forman.  --- Please feel free to reach out if you have any further questions or concerns.  --- Seek immediate medical attention if an emergency arises or if your health becomes progressively worse.     It was a pleasure to see you today!

## 2024-05-30 ENCOUNTER — LAB REQUISITION (OUTPATIENT)
Dept: LAB | Facility: HOSPITAL | Age: 59
End: 2024-05-30
Payer: MEDICARE

## 2024-05-30 DIAGNOSIS — D64.9 ANEMIA, UNSPECIFIED: ICD-10-CM

## 2024-05-30 DIAGNOSIS — F73 PROFOUND INTELLECTUAL DISABILITIES: ICD-10-CM

## 2024-05-30 DIAGNOSIS — G40.901 EPILEPSY, UNSPECIFIED, NOT INTRACTABLE, WITH STATUS EPILEPTICUS (H): ICD-10-CM

## 2024-05-30 DIAGNOSIS — G80.9 CEREBRAL PALSY, UNSPECIFIED (H): ICD-10-CM

## 2024-05-30 DIAGNOSIS — K21.00 GASTRO-ESOPHAGEAL REFLUX DISEASE WITH ESOPHAGITIS, WITHOUT BLEEDING: ICD-10-CM

## 2024-05-30 LAB
IRON BINDING CAPACITY (ROCHE): 333 UG/DL (ref 240–430)
IRON SATN MFR SERPL: 12 % (ref 15–46)
IRON SERPL-MCNC: 40 UG/DL (ref 37–145)

## 2024-05-30 PROCEDURE — 83550 IRON BINDING TEST: CPT | Mod: ORL | Performed by: PSYCHIATRY & NEUROLOGY

## 2024-05-30 PROCEDURE — 80177 DRUG SCRN QUAN LEVETIRACETAM: CPT | Mod: ORL | Performed by: PSYCHIATRY & NEUROLOGY

## 2024-05-30 PROCEDURE — 80201 ASSAY OF TOPIRAMATE: CPT | Mod: ORL | Performed by: PSYCHIATRY & NEUROLOGY

## 2024-05-30 PROCEDURE — 80175 DRUG SCREEN QUAN LAMOTRIGINE: CPT | Mod: ORL | Performed by: PSYCHIATRY & NEUROLOGY

## 2024-05-30 PROCEDURE — 36415 COLL VENOUS BLD VENIPUNCTURE: CPT | Mod: ORL | Performed by: PSYCHIATRY & NEUROLOGY

## 2024-05-31 LAB
LAMOTRIGINE SERPL-MCNC: 11.3 UG/ML
LEVETIRACETAM SERPL-MCNC: 75.4 ΜG/ML (ref 10–40)
TOPIRAMATE SERPL-MCNC: 16.5 UG/ML

## 2024-06-01 DIAGNOSIS — G40.319 IDIOPATHIC GENERALIZED EPILEPSY, INTRACTABLE (H): Primary | ICD-10-CM

## 2024-06-01 NOTE — RESULT ENCOUNTER NOTE
Keppra level is significantly higher.  Repeat trough Keppra level and that 2 is elevated we will need to decrease the Keppra dose to 750 mg twice daily

## 2024-06-04 ENCOUNTER — TELEPHONE (OUTPATIENT)
Dept: NEUROLOGY | Facility: CLINIC | Age: 59
End: 2024-06-04
Payer: MEDICARE

## 2024-06-04 NOTE — TELEPHONE ENCOUNTER
Lex Forman MD  6/1/2024  9:18 AM CDT Back to Top      Keppra level is significantly higher.  Repeat trough Keppra level and that 2 is elevated we will need to decrease the Keppra dose to 750 mg twice daily

## 2024-06-04 NOTE — LETTER
"Vickie 10, 2024      Elysia CORTEZ Arellano  [address]  [place] [facility] [address]        Dear ,    We are writing to inform you of your test results.    We have been unsuccessful in reaching you (Zack) via phone- the message says \"The party you are trying to reach is not accepting calls at this time.\"    Keppra level is significantly higher.  Repeat trough Keppra level and that 2 is elevated we will need to decrease the Keppra dose to 750 mg twice daily   Trough level- when the blood is drawn before a scheduled dose. This is usually done right in  the morning before AM dosing.     If you have any questions or concerns, please call the clinic at the number listed above.       Sincerely,    Lex Forman MD             "

## 2024-06-04 NOTE — TELEPHONE ENCOUNTER
Attempted call to phone number listed ([phone]), message that this number is not currently accepting incoming calls.     Will attempt call again on next day of business.    Jim Osorio RN, BSN  [facility] Neurology

## 2024-06-04 NOTE — TELEPHONE ENCOUNTER
Health Call Center    Phone Message    May a detailed message be left on voicemail: yes     Reason for Call: Zack pts nurse reporting pts labs says, high kepra lab results are 75.4, normal range 10 - 40. Will be faxing the info in.  Please call Zack at [phone] to discuss further.    Action Taken: Message routed to:  Other: [facility]    Travel Screening: Not Applicable     Date of Service:

## 2024-06-05 NOTE — TELEPHONE ENCOUNTER
"Attempt multiple calls again to phone number listed, prompt states \"The party you are trying to reach is not accepting calls at this time.\"    Will need to reach out again at another time.    Jim Osorio RN, BSN  [facility] Neurology    "

## 2024-06-07 NOTE — TELEPHONE ENCOUNTER
"Attempt multiple calls again to phone number listed, prompt states \"The party you are trying to reach is not accepting calls at this time.\"     Agatha Lawler CMA on 6/7/2024 at 1:34 PM  [facility]     "

## 2024-06-10 NOTE — TELEPHONE ENCOUNTER
"Attempt multiple calls again to phone number listed, prompt states \"The party you are trying to reach is not accepting calls at this time.\"    Letter mailed to address on file    Agatha Lawler CMA on 6/10/2024 at 2:38 PM  [facility]           "

## 2024-07-01 ENCOUNTER — TELEPHONE (OUTPATIENT)
Dept: INTERVENTIONAL RADIOLOGY/VASCULAR | Facility: CLINIC | Age: 59
End: 2024-07-01
Payer: MEDICARE

## 2024-07-06 ENCOUNTER — LAB REQUISITION (OUTPATIENT)
Dept: LAB | Facility: HOSPITAL | Age: 59
End: 2024-07-06
Payer: MEDICARE

## 2024-07-06 DIAGNOSIS — G40.901 EPILEPSY, UNSPECIFIED, NOT INTRACTABLE, WITH STATUS EPILEPTICUS (H): ICD-10-CM

## 2024-07-06 LAB — LEVETIRACETAM SERPL-MCNC: 22.1 ΜG/ML (ref 10–40)

## 2024-07-06 PROCEDURE — 80177 DRUG SCRN QUAN LEVETIRACETAM: CPT | Mod: ORL | Performed by: PSYCHIATRY & NEUROLOGY

## 2024-07-06 PROCEDURE — 36415 COLL VENOUS BLD VENIPUNCTURE: CPT | Mod: ORL | Performed by: PSYCHIATRY & NEUROLOGY

## 2024-07-08 ENCOUNTER — HOSPITAL ENCOUNTER (OUTPATIENT)
Dept: INTERVENTIONAL RADIOLOGY/VASCULAR | Facility: HOSPITAL | Age: 59
Discharge: HOME | End: 2024-07-08
Attending: NURSE PRACTITIONER | Admitting: NURSE PRACTITIONER
Payer: MEDICARE

## 2024-07-08 DIAGNOSIS — R63.30 FEEDING DIFFICULTIES: ICD-10-CM

## 2024-07-08 DIAGNOSIS — R63.30 FEEDING DIFFICULTIES: Primary | ICD-10-CM

## 2024-07-08 PROCEDURE — 255N000002 HC RX 255 OP 636: Performed by: RADIOLOGY

## 2024-07-08 PROCEDURE — 49452 REPLACE G-J TUBE PERC: CPT

## 2024-07-08 PROCEDURE — C1769 GUIDE WIRE: HCPCS

## 2024-07-08 RX ADMIN — IOHEXOL 15 ML: 350 INJECTION, SOLUTION INTRAVENOUS at 11:12

## 2024-07-08 NOTE — DISCHARGE INSTRUCTIONS
Gastrostomy (G) or Gastrojejunostomy (G/J) Tube Exchange Discharge Instructions:   You had a gastrostomy (G) tube or a Gastrojejunostomy Tube (GJ) exchanged.  This tube is often used for nutritional support and medication administration or it can be used for stomach venting.     Care instructions:  - If you received sedation for your procedure, do not drive or operate heavy machinery for the rest of the day.  - Avoid soaking in stagnant water (tub baths, Jacuzzis, pools, lake, or ocean).   - You may shower beginning the day after the tube was exchanged.   - Clean under the disc daily with soap and water. Pat dry under disc and apply new split gauze dressing under disc. Cleaning tube site daily will help prevent infection and skin irritation.  - A small amount of clear tan drainage from the tube exit site can be normal.  - Make sure the disc on the tube fits slightly snug against the skin so that the tube does not move in or out of the body easily.   - If you experience leaking from around tube exit site, the most common cause is that the disc is not tightened against the skin.   - To tighten the disc, pull gently on the tube so the retention balloon (under the skin) is pulled up against the skin under the tube. Push the disc down until it is tight against the skin without a gap between the skin and the disc.  - Flush your tube with 60cc of water twice a day (using a cath tip syringe) to prevent tube from clogging (or follow recommendations from your doctor or dietician if given).    Giving Feedings and Medications:  - Follow-up with your dietician, primary care provider, or oncologist for instructions on tube feedings and medication administration.    - ONLY use specific enteric feedings with your tube (unless otherwise discussed with your dietitian).    - Flush tube at least twice daily with 60ml of water using cath tip syringe unless otherwise instructed by your doctor or dietician.  - Flush the tube before and  after administrating medications and bolus feedings with 60cc of water.    Follow Up:  - Recommend routine 3 month exchanges of feeding tube. Please contact your primary care provider or speak with your dietitian to obtain an order to have your G/GJ tube exchanged. Then contact [facility] scheduling department at [phone] to schedule your G/GJ tube exchange appointment.    Please seek medical evaluation for:  - Fever (greater than 101 F (38.3C).  - Purulent (yellow/green/foul smelling) drainage from tube exit site.   - Significant or worsening abdominal pain.   - Skin that is hot to the touch or significantly reddened at the tube exit site.   - Bleeding at tube exit site.    Call [facility] IR RN Line at [phone] with questions or if you have any of the following symptoms:  - Tube falls out or felt to be out of position.  - Unable to flush tube.  - Significant leakage around tube site (tube feeding, medications or drainage).  - Significant bleeding at the tube exit site.  - Severe pain at tube exit site.

## 2024-09-04 ENCOUNTER — HOSPITAL ENCOUNTER (OUTPATIENT)
Dept: INTERVENTIONAL RADIOLOGY/VASCULAR | Facility: CLINIC | Age: 59
Discharge: HOME | End: 2024-09-04
Admitting: RADIOLOGY
Payer: MEDICARE

## 2024-09-04 VITALS
DIASTOLIC BLOOD PRESSURE: 71 MMHG | SYSTOLIC BLOOD PRESSURE: 119 MMHG | OXYGEN SATURATION: 100 % | HEART RATE: 71 BPM | RESPIRATION RATE: 16 BRPM

## 2024-09-04 DIAGNOSIS — R63.30 FEEDING DIFFICULTIES: Primary | ICD-10-CM

## 2024-09-04 DIAGNOSIS — R63.30 FEEDING DIFFICULTIES: ICD-10-CM

## 2024-09-04 PROCEDURE — 250N000009 HC RX 250: Performed by: RADIOLOGY

## 2024-09-04 PROCEDURE — 49452 REPLACE G-J TUBE PERC: CPT

## 2024-09-04 PROCEDURE — C1769 GUIDE WIRE: HCPCS

## 2024-09-04 RX ADMIN — SODIUM CHLORIDE 1 BAG: 9 INJECTION, SOLUTION INTRAVENOUS at 13:31

## 2024-09-04 NOTE — IR NOTE
Patient Name: Elysia Arellano  Medical Record Number: [id number]  Today's Date: 9/4/2024    Procedure: GJ tube exchange  Proceduralist: Dr. Grigsby    Procedure Start: 1346  Procedure end: 1354  Sedation medications administered: N/a    Other Notes: Pt arrived to IR room 1 from Pre/post bay 1. Consent reviewed. Pt denies any questions or concerns regarding procedure. Pt positioned supine and monitored per protocol. Pt tolerated procedure without any noted complications. VSS on RA. Pt transferred back to Pre/post bay 1.

## 2024-09-04 NOTE — DISCHARGE INSTRUCTIONS
Gastrostomy (G) or Gastrojejunostomy (G/J) Tube Exchange Discharge Instructions:   You had a gastrostomy (G) tube or a Gastrojejunostomy Tube (GJ) exchanged.  This tube is often used for nutritional support and medication administration or it can be used for stomach venting.     Care instructions:  - If you received sedation for your procedure, do not drive or operate heavy machinery for the rest of the day.  - Avoid soaking in stagnant water (tub baths, Jacuzzis, pools, lake, or ocean).   - You may shower beginning the day after the tube was exchanged.   - Clean under the disc daily with soap and water. Pat dry under disc and apply new split gauze dressing under disc. Cleaning tube site daily will help prevent infection and skin irritation.  - A small amount of clear tan drainage from the tube exit site can be normal.  - Make sure the disc on the tube fits slightly snug against the skin so that the tube does not move in or out of the body easily.   - If you experience leaking from around tube exit site, the most common cause is that the disc is not tightened against the skin.   - To tighten the disc, pull gently on the tube so the retention balloon (under the skin) is pulled up against the skin under the tube. Push the disc down until it is tight against the skin without a gap between the skin and the disc.  - Flush your tube with 60cc of water twice a day (using a cath tip syringe) to prevent tube from clogging (or follow recommendations from your doctor or dietician if given).    Giving Feedings and Medications:  - Follow-up with your dietician, primary care provider, or oncologist for instructions on tube feedings and medication administration.    - ONLY use specific enteric feedings with your tube (unless otherwise discussed with your dietitian).    - Flush tube at least twice daily with 60ml of water using cath tip syringe unless otherwise instructed by your doctor or dietician.  - Flush the tube before and  after administrating medications and bolus feedings with 60cc of water.    Follow Up:  - Recommend routine 3 month exchanges of feeding tube. Please contact your primary care provider or speak with your dietitian to obtain an order to have your G/GJ tube exchanged. Then contact [facility]'s Medical Imaging scheduling department at [phone] to schedule your G/GJ tube exchange appointment.    Please seek medical evaluation for:  - Fever (greater than 101 F (38.3C).  - Purulent (yellow/green/foul smelling) drainage from tube exit site.   - Significant or worsening abdominal pain.   - Skin that is hot to the touch or significantly reddened at the tube exit site.   - Bleeding at tube exit site.    Call [place] IR RN Line at [phone] with questions or if you have any of the following symptoms:  - Tube falls out or felt to be out of position.  - Unable to flush tube.  - Significant leakage around tube site (tube feeding, medications or drainage).  - Significant bleeding at the tube exit site.  - Severe pain at tube exit site.

## 2024-10-10 NOTE — TELEPHONE ENCOUNTER
[facility] staff Gunnar GUTIERREZ calling - wondering when IV Antibiotics are to be started. Patient was seen today. Please call staff line provided:  [phone]   AST/ALT elevated 2/2 rhabdo  c/t monitor with treatment as above  RUQ ultrasound showed mild diffuse fatty liver    PLAN:  f/u [facility] work up AST/ALT elevated 2/2 rhabdo  c/t monitor with treatment as above  RUQ ultrasound showed mild diffuse fatty liver    PLAN:  f/u gilbert's work up  outpatient follow up

## 2024-12-17 ENCOUNTER — TELEPHONE (OUTPATIENT)
Dept: INTERVENTIONAL RADIOLOGY/VASCULAR | Facility: CLINIC | Age: 59
End: 2024-12-17
Payer: MEDICARE

## 2024-12-19 ENCOUNTER — HOSPITAL ENCOUNTER (OUTPATIENT)
Dept: INTERVENTIONAL RADIOLOGY/VASCULAR | Facility: HOSPITAL | Age: 59
Discharge: HOME | End: 2024-12-19
Attending: NURSE PRACTITIONER
Payer: MEDICARE

## 2024-12-19 DIAGNOSIS — Z93.4 GASTROJEJUNOSTOMY TUBE STATUS (H): Primary | ICD-10-CM

## 2024-12-19 DIAGNOSIS — R63.30 FEEDING DIFFICULTIES: ICD-10-CM

## 2024-12-19 PROCEDURE — 49452 REPLACE G-J TUBE PERC: CPT

## 2024-12-19 PROCEDURE — C1769 GUIDE WIRE: HCPCS

## 2024-12-19 NOTE — DISCHARGE INSTRUCTIONS
Gastrostomy (G) or Gastrojejunostomy (G/J) Tube Exchange Discharge Instructions:   You had a gastrostomy (G) tube or a Gastrojejunostomy Tube (GJ) exchanged.  This tube is often used for nutritional support and medication administration or it can be used for stomach venting.     Care instructions:  - If you received sedation for your procedure, do not drive or operate heavy machinery for the rest of the day.  - Avoid soaking in stagnant water (tub baths, Jacuzzis, pools, lake, or ocean).   - You may shower beginning the day after the tube was exchanged.   - Clean under the disc daily with soap and water. Pat dry under disc and apply new split gauze dressing under disc. Cleaning tube site daily will help prevent infection and skin irritation.  - A small amount of clear tan drainage from the tube exit site can be normal.  - Make sure the disc on the tube fits slightly snug against the skin so that the tube does not move in or out of the body easily.   - If you experience leaking from around tube exit site, the most common cause is that the disc is not tightened against the skin.   - To tighten the disc, pull gently on the tube so the retention balloon (under the skin) is pulled up against the skin under the tube. Push the disc down until it is tight against the skin without a gap between the skin and the disc.  - Flush your tube with 60cc of water twice a day (using a cath tip syringe) to prevent tube from clogging (or follow recommendations from your doctor or dietician if given).    Giving Feedings and Medications:  - Follow-up with your dietician, primary care provider, or oncologist for instructions on tube feedings and medication administration.    - ONLY use specific enteric feedings with your tube (unless otherwise discussed with your dietitian).    - Flush tube at least twice daily with 60ml of water using cath tip syringe unless otherwise instructed by your doctor or dietician.  - Flush the tube before and  after administrating medications and bolus feedings with 60cc of water.    Follow Up:  - Recommend routine 3 month exchanges of feeding tube. Please contact your primary care provider or speak with your dietitian to obtain an order to have your G/GJ tube exchanged. Then contact [facility]'s Medical Imaging scheduling department at [phone] to schedule your G/GJ tube exchange appointment.    Please seek medical evaluation for:  - Fever (greater than 101 F (38.3C).  - Purulent (yellow/green/foul smelling) drainage from tube exit site.   - Significant or worsening abdominal pain.   - Skin that is hot to the touch or significantly reddened at the tube exit site.   - Bleeding at tube exit site.    Call [place] IR RN Line at [phone] with questions or if you have any of the following symptoms:  - Tube falls out or felt to be out of position.  - Unable to flush tube.  - Significant leakage around tube site (tube feeding, medications or drainage).  - Significant bleeding at the tube exit site.  - Severe pain at tube exit site.

## 2025-02-28 ENCOUNTER — LAB REQUISITION (OUTPATIENT)
Dept: LAB | Facility: CLINIC | Age: 60
End: 2025-02-28
Payer: MEDICARE

## 2025-02-28 DIAGNOSIS — Z00.00 ENCOUNTER FOR GENERAL ADULT MEDICAL EXAMINATION WITHOUT ABNORMAL FINDINGS: ICD-10-CM

## 2025-02-28 DIAGNOSIS — E03.9 HYPOTHYROIDISM, UNSPECIFIED: ICD-10-CM

## 2025-02-28 LAB
ERYTHROCYTE [DISTWIDTH] IN BLOOD BY AUTOMATED COUNT: 14.8 % (ref 10–15)
HCT VFR BLD AUTO: 42.1 % (ref 35–47)
HGB BLD-MCNC: 13.5 G/DL (ref 11.7–15.7)
MCH RBC QN AUTO: 29.4 PG (ref 26.5–33)
MCHC RBC AUTO-ENTMCNC: 32.1 G/DL (ref 31.5–36.5)
MCV RBC AUTO: 92 FL (ref 78–100)
PLATELET # BLD AUTO: 295 10E3/UL (ref 150–450)
RBC # BLD AUTO: 4.59 10E6/UL (ref 3.8–5.2)
WBC # BLD AUTO: 5.8 10E3/UL (ref 4–11)

## 2025-02-28 PROCEDURE — 85027 COMPLETE CBC AUTOMATED: CPT | Mod: ORL | Performed by: FAMILY MEDICINE

## 2025-02-28 PROCEDURE — 84443 ASSAY THYROID STIM HORMONE: CPT | Mod: ORL | Performed by: FAMILY MEDICINE

## 2025-02-28 PROCEDURE — 80053 COMPREHEN METABOLIC PANEL: CPT | Mod: ORL | Performed by: FAMILY MEDICINE

## 2025-02-28 PROCEDURE — 80061 LIPID PANEL: CPT | Mod: ORL | Performed by: FAMILY MEDICINE

## 2025-03-01 LAB
ALBUMIN SERPL BCG-MCNC: 3.9 G/DL (ref 3.5–5.2)
ALP SERPL-CCNC: 245 U/L (ref 40–150)
ALT SERPL W P-5'-P-CCNC: <5 U/L (ref 0–50)
ANION GAP SERPL CALCULATED.3IONS-SCNC: 12 MMOL/L (ref 7–15)
AST SERPL W P-5'-P-CCNC: 25 U/L (ref 0–45)
BILIRUB SERPL-MCNC: 0.3 MG/DL
BUN SERPL-MCNC: 16 MG/DL (ref 6–20)
CALCIUM SERPL-MCNC: 9.4 MG/DL (ref 8.8–10.4)
CHLORIDE SERPL-SCNC: 102 MMOL/L (ref 98–107)
CHOLEST SERPL-MCNC: 211 MG/DL
CREAT SERPL-MCNC: 0.49 MG/DL (ref 0.51–0.95)
EGFRCR SERPLBLD CKD-EPI 2021: >90 ML/MIN/1.73M2
FASTING STATUS PATIENT QL REPORTED: NO
FASTING STATUS PATIENT QL REPORTED: NO
GLUCOSE SERPL-MCNC: 97 MG/DL (ref 70–99)
HCO3 SERPL-SCNC: 27 MMOL/L (ref 22–29)
HDLC SERPL-MCNC: 51 MG/DL
LDLC SERPL CALC-MCNC: 145 MG/DL
NONHDLC SERPL-MCNC: 160 MG/DL
POTASSIUM SERPL-SCNC: 3.6 MMOL/L (ref 3.4–5.3)
PROT SERPL-MCNC: 7.4 G/DL (ref 6.4–8.3)
SODIUM SERPL-SCNC: 141 MMOL/L (ref 135–145)
TRIGL SERPL-MCNC: 74 MG/DL
TSH SERPL DL<=0.005 MIU/L-ACNC: 1.99 UIU/ML (ref 0.3–4.2)

## 2025-03-11 ENCOUNTER — HOSPITAL ENCOUNTER (OUTPATIENT)
Dept: INTERVENTIONAL RADIOLOGY/VASCULAR | Facility: HOSPITAL | Age: 60
Discharge: HOME | End: 2025-03-11
Attending: NURSE PRACTITIONER | Admitting: RADIOLOGY
Payer: MEDICARE

## 2025-03-11 DIAGNOSIS — Z93.4 GASTROJEJUNOSTOMY TUBE STATUS (H): ICD-10-CM

## 2025-03-11 DIAGNOSIS — Z93.4 GASTROJEJUNOSTOMY TUBE STATUS (H): Primary | ICD-10-CM

## 2025-03-11 PROCEDURE — 49452 REPLACE G-J TUBE PERC: CPT

## 2025-03-11 PROCEDURE — C1769 GUIDE WIRE: HCPCS

## 2025-03-11 PROCEDURE — 272N000117 HC CATH CR2

## 2025-03-11 NOTE — DISCHARGE INSTRUCTIONS
Gastrostomy (G) or Gastrojejunostomy (G/J) Tube Exchange Discharge Instructions:   You had a gastrostomy (G) tube or a Gastrojejunostomy Tube (GJ) exchanged.  This tube is often used for nutritional support and medication administration or it can be used for stomach venting.     Care instructions:  - If you received sedation for your procedure, do not drive or operate heavy machinery for the rest of the day.  - Avoid soaking in stagnant water (tub baths, Jacuzzis, pools, lake, or ocean).   - You may shower beginning the day after the tube was exchanged.   - Clean under the disc daily with soap and water. Pat dry under disc and apply new split gauze dressing under disc. Cleaning tube site daily will help prevent infection and skin irritation.  - A small amount of clear tan drainage from the tube exit site can be normal.  - Make sure the disc on the tube fits slightly snug against the skin so that the tube does not move in or out of the body easily.   - If you experience leaking from around tube exit site, the most common cause is that the disc is not tightened against the skin.   - To tighten the disc, pull gently on the tube so the retention balloon (under the skin) is pulled up against the skin under the tube. Push the disc down until it is tight against the skin without a gap between the skin and the disc.  - Flush your tube with 60cc of water twice a day (using a cath tip syringe) to prevent tube from clogging (or follow recommendations from your doctor or dietician if given).    Giving Feedings and Medications:  - Follow-up with your dietician, primary care provider, or oncologist for instructions on tube feedings and medication administration.    - ONLY use specific enteric feedings with your tube (unless otherwise discussed with your dietitian).    - Flush tube at least twice daily with 60ml of water using cath tip syringe unless otherwise instructed by your doctor or dietician.  - Flush the tube before and  after administrating medications and bolus feedings with 60cc of water.    Follow Up:  - Recommend routine 3 month exchanges of feeding tube. Please contact your primary care provider or speak with your dietitian to obtain an order to have your G/GJ tube exchanged. Then contact [facility]'s Medical Imaging scheduling department at [phone] to schedule your G/GJ tube exchange appointment.    Please seek medical evaluation for:  - Fever (greater than 101 F (38.3C).  - Purulent (yellow/green/foul smelling) drainage from tube exit site.   - Significant or worsening abdominal pain.   - Skin that is hot to the touch or significantly reddened at the tube exit site.   - Bleeding at tube exit site.    Call [place] IR RN Line at [phone] with questions or if you have any of the following symptoms:  - Tube falls out or felt to be out of position.  - Unable to flush tube.  - Significant leakage around tube site (tube feeding, medications or drainage).  - Significant bleeding at the tube exit site.  - Severe pain at tube exit site.

## 2025-05-08 ENCOUNTER — HOSPITAL ENCOUNTER (OUTPATIENT)
Dept: INTERVENTIONAL RADIOLOGY/VASCULAR | Facility: CLINIC | Age: 60
Discharge: HOME | End: 2025-05-08
Attending: NURSE PRACTITIONER
Payer: MEDICARE

## 2025-05-08 DIAGNOSIS — Z93.4 GASTROJEJUNOSTOMY TUBE STATUS (H): Primary | ICD-10-CM

## 2025-05-08 DIAGNOSIS — Z93.4 GASTROJEJUNOSTOMY TUBE STATUS (H): ICD-10-CM

## 2025-05-08 PROCEDURE — 250N000009 HC RX 250: Performed by: RADIOLOGY

## 2025-05-08 PROCEDURE — 255N000002 HC RX 255 OP 636: Performed by: NURSE PRACTITIONER

## 2025-05-08 PROCEDURE — 49452 REPLACE G-J TUBE PERC: CPT

## 2025-05-08 PROCEDURE — C1769 GUIDE WIRE: HCPCS

## 2025-05-08 PROCEDURE — 272N000117 HC CATH CR2

## 2025-05-08 RX ORDER — LIDOCAINE HYDROCHLORIDE 20 MG/ML
JELLY TOPICAL ONCE
Status: COMPLETED | OUTPATIENT
Start: 2025-05-08 | End: 2025-05-08

## 2025-05-08 RX ORDER — IOPAMIDOL 612 MG/ML
30 INJECTION, SOLUTION INTRAVASCULAR ONCE
Status: COMPLETED | OUTPATIENT
Start: 2025-05-08 | End: 2025-05-08

## 2025-05-08 RX ADMIN — LIDOCAINE HYDROCHLORIDE: 20 JELLY TOPICAL at 13:10

## 2025-05-08 RX ADMIN — IOPAMIDOL 20 ML: 612 INJECTION, SOLUTION INTRAVENOUS at 13:20

## 2025-05-08 NOTE — IR NOTE
Patient Name: Elysia Arellano  Medical Record Number: [id number]  Today's Date: 5/8/2025    Procedure: GJ Exchange  Proceduralist: Dr Shahid  Pathology present: No    Procedure Start: 1307  Procedure end: 1313  Sedation medications administered: NA     Report given to: VINEET  : No    Other Notes: Pt arrived to IR room  from pre/post rm 1. Consent reviewed. Pt denies any questions or concerns regarding procedure. Pt positioned supine and monitored per protocol. Pt tolerated procedure without any noted complications. Pt transferred back to pre/post rm 1.    Neli Thakkar RN

## 2025-05-08 NOTE — DISCHARGE INSTRUCTIONS
Gastrostomy (G) or Gastrojejunostomy (G/J) Tube Exchange Discharge Instructions:   You had a Gastrojejunostomy Tube (GJ) exchanged.  This tube is often used for nutritional support and medication administration or it can be used for stomach venting.     Care instructions:  - If you received sedation for your procedure, do not drive or operate heavy machinery for the rest of the day.  - Avoid soaking in stagnant water (tub baths, Jacuzzis, pools, lake, or ocean).   - You may shower beginning the day after the tube was exchanged.   - Clean under the disc daily with soap and water. Pat dry under disc and apply new split gauze dressing under disc. Cleaning tube site daily will help prevent infection and skin irritation.  - A small amount of clear tan drainage from the tube exit site can be normal.  - Make sure the disc on the tube fits slightly snug against the skin so that the tube does not move in or out of the body easily.   - If you experience leaking from around tube exit site, the most common cause is that the disc is not tightened against the skin.   - To tighten the disc, pull gently on the tube so the retention balloon (under the skin) is pulled up against the skin under the tube. Push the disc down until it is tight against the skin without a gap between the skin and the disc.  - Flush your tube with 60cc of water twice a day (using a cath tip syringe) to prevent tube from clogging (or follow recommendations from your doctor or dietician if given).    Giving Feedings and Medications:  - Follow-up with your dietician, primary care provider, or oncologist for instructions on tube feedings and medication administration.    - ONLY use specific enteric feedings with your tube (unless otherwise discussed with your dietitian).    - Flush tube at least twice daily with 60ml of water using cath tip syringe unless otherwise instructed by your doctor or dietician.  - Flush the tube before and after administrating  medications and bolus feedings with 60cc of water.    Follow Up:  - Recommend routine 3 month exchanges of feeding tube. Please contact your primary care provider or speak with your dietitian to obtain an order to have your G/GJ tube exchanged. Then contact [facility]'s Medical Imaging scheduling department at [phone] to schedule your G/GJ tube exchange appointment.    Please seek medical evaluation for:  - Fever (greater than 101 F (38.3C).  - Purulent (yellow/green/foul smelling) drainage from tube exit site.   - Significant or worsening abdominal pain.   - Skin that is hot to the touch or significantly reddened at the tube exit site.   - Bleeding at tube exit site.    Call [place] IR RN Line at [phone] with questions or if you have any of the following symptoms:  - Tube falls out or felt to be out of position.  - Unable to flush tube.  - Significant leakage around tube site (tube feeding, medications or drainage).  - Significant bleeding at the tube exit site.  - Severe pain at tube exit site.

## 2025-05-29 ENCOUNTER — LAB REQUISITION (OUTPATIENT)
Dept: LAB | Facility: HOSPITAL | Age: 60
End: 2025-05-29
Payer: MEDICARE

## 2025-05-29 DIAGNOSIS — G80.9 CEREBRAL PALSY, UNSPECIFIED (H): ICD-10-CM

## 2025-05-29 DIAGNOSIS — R56.9 UNSPECIFIED CONVULSIONS (H): ICD-10-CM

## 2025-05-29 DIAGNOSIS — F73 PROFOUND INTELLECTUAL DISABILITIES: ICD-10-CM

## 2025-05-29 DIAGNOSIS — D64.9 ANEMIA, UNSPECIFIED: ICD-10-CM

## 2025-05-29 DIAGNOSIS — G40.901 EPILEPSY, UNSPECIFIED, NOT INTRACTABLE, WITH STATUS EPILEPTICUS (H): ICD-10-CM

## 2025-05-29 LAB
ALBUMIN SERPL BCG-MCNC: 4.1 G/DL (ref 3.5–5.2)
ALBUMIN SERPL BCG-MCNC: 4.1 G/DL (ref 3.5–5.2)
ALP SERPL-CCNC: 250 U/L (ref 40–150)
ALT SERPL W P-5'-P-CCNC: 6 U/L (ref 0–50)
AMMONIA PLAS-SCNC: 21 UMOL/L (ref 11–51)
ANION GAP SERPL CALCULATED.3IONS-SCNC: 7 MMOL/L (ref 7–15)
AST SERPL W P-5'-P-CCNC: 21 U/L (ref 0–45)
BILIRUB DIRECT SERPL-MCNC: <0.08 MG/DL (ref 0–0.3)
BILIRUB SERPL-MCNC: 0.2 MG/DL
BUN SERPL-MCNC: 14.1 MG/DL (ref 6–20)
CALCIUM SERPL-MCNC: 9.8 MG/DL (ref 8.8–10.4)
CHLORIDE SERPL-SCNC: 102 MMOL/L (ref 98–107)
CHOLEST SERPL-MCNC: 203 MG/DL
CREAT SERPL-MCNC: 0.42 MG/DL (ref 0.51–0.95)
EGFRCR SERPLBLD CKD-EPI 2021: >90 ML/MIN/1.73M2
ERYTHROCYTE [DISTWIDTH] IN BLOOD BY AUTOMATED COUNT: 14.2 % (ref 10–15)
FASTING STATUS PATIENT QL REPORTED: NO
GLUCOSE SERPL-MCNC: 98 MG/DL (ref 70–99)
HCO3 SERPL-SCNC: 33 MMOL/L (ref 22–29)
HCT VFR BLD AUTO: 43.1 % (ref 35–47)
HDLC SERPL-MCNC: 53 MG/DL
HGB BLD-MCNC: 13.7 G/DL (ref 11.7–15.7)
IRON BINDING CAPACITY (ROCHE): 403 UG/DL (ref 240–430)
IRON SATN MFR SERPL: 8 % (ref 15–46)
IRON SERPL-MCNC: 32 UG/DL (ref 37–145)
LDLC SERPL CALC-MCNC: 135 MG/DL
MCH RBC QN AUTO: 29.7 PG (ref 26.5–33)
MCHC RBC AUTO-ENTMCNC: 31.8 G/DL (ref 31.5–36.5)
MCV RBC AUTO: 93 FL (ref 78–100)
NONHDLC SERPL-MCNC: 150 MG/DL
PHOSPHATE SERPL-MCNC: 4.2 MG/DL (ref 2.5–4.5)
PLATELET # BLD AUTO: 289 10E3/UL (ref 150–450)
POTASSIUM SERPL-SCNC: 4.4 MMOL/L (ref 3.4–5.3)
PROT SERPL-MCNC: 7.7 G/DL (ref 6.4–8.3)
RBC # BLD AUTO: 4.62 10E6/UL (ref 3.8–5.2)
SODIUM SERPL-SCNC: 142 MMOL/L (ref 135–145)
TRIGL SERPL-MCNC: 73 MG/DL
WBC # BLD AUTO: 6.5 10E3/UL (ref 4–11)

## 2025-05-29 PROCEDURE — 85027 COMPLETE CBC AUTOMATED: CPT | Mod: ORL | Performed by: FAMILY MEDICINE

## 2025-05-29 PROCEDURE — 84100 ASSAY OF PHOSPHORUS: CPT | Mod: ORL | Performed by: FAMILY MEDICINE

## 2025-05-29 PROCEDURE — 83550 IRON BINDING TEST: CPT | Mod: ORL | Performed by: FAMILY MEDICINE

## 2025-05-29 PROCEDURE — 80061 LIPID PANEL: CPT | Mod: ORL | Performed by: FAMILY MEDICINE

## 2025-05-29 PROCEDURE — 36415 COLL VENOUS BLD VENIPUNCTURE: CPT | Mod: ORL | Performed by: FAMILY MEDICINE

## 2025-05-29 PROCEDURE — 82248 BILIRUBIN DIRECT: CPT | Mod: ORL | Performed by: FAMILY MEDICINE

## 2025-05-29 PROCEDURE — 82140 ASSAY OF AMMONIA: CPT | Mod: ORL | Performed by: FAMILY MEDICINE

## 2025-07-08 ENCOUNTER — APPOINTMENT (OUTPATIENT)
Dept: INTERVENTIONAL RADIOLOGY/VASCULAR | Facility: CLINIC | Age: 60
End: 2025-07-08
Attending: NURSE PRACTITIONER
Payer: MEDICARE

## 2025-07-08 ENCOUNTER — HOSPITAL ENCOUNTER (OUTPATIENT)
Facility: CLINIC | Age: 60
Discharge: HOME | End: 2025-07-08
Attending: RADIOLOGY | Admitting: RADIOLOGY
Payer: MEDICARE

## 2025-07-08 VITALS
HEART RATE: 70 BPM | SYSTOLIC BLOOD PRESSURE: 129 MMHG | DIASTOLIC BLOOD PRESSURE: 93 MMHG | RESPIRATION RATE: 20 BRPM | OXYGEN SATURATION: 97 %

## 2025-07-08 DIAGNOSIS — Z93.4 GASTROJEJUNOSTOMY TUBE STATUS (H): ICD-10-CM

## 2025-07-08 PROCEDURE — 272N000001 HC OR GENERAL SUPPLY STERILE

## 2025-07-08 PROCEDURE — 250N000009 HC RX 250: Performed by: RADIOLOGY

## 2025-07-08 PROCEDURE — C1769 GUIDE WIRE: HCPCS

## 2025-07-08 PROCEDURE — 49452 REPLACE G-J TUBE PERC: CPT

## 2025-07-08 RX ORDER — LIDOCAINE HYDROCHLORIDE 20 MG/ML
JELLY TOPICAL ONCE
Status: COMPLETED | OUTPATIENT
Start: 2025-07-08 | End: 2025-07-08

## 2025-07-08 RX ADMIN — LIDOCAINE HYDROCHLORIDE 6 ML: 20 JELLY TOPICAL at 09:29

## 2025-07-08 ASSESSMENT — ACTIVITIES OF DAILY LIVING (ADL): ADLS_ACUITY_SCORE: 63

## 2025-07-08 NOTE — PROGRESS NOTES
Procedure GJ tube exchange    Contrast omnipaque 240   20ml   Lidocaine jelly  Fluoro time 0.7 min  AK 4 mGy

## 2025-07-08 NOTE — PROGRESS NOTES
GJ tube exchanged without difficulty. Patient transferred back to chair. Left in baseline condition with caregiver.

## 2025-07-08 NOTE — SEDATION DOCUMENTATION
GJ tube exchange completed.  No sedation medication utilized.  Patient tolerated well. Patient transferred to cath/IR holding room via cart accompanied by RN.

## (undated) RX ORDER — LIDOCAINE HYDROCHLORIDE 20 MG/ML
JELLY TOPICAL
Status: DISPENSED
Start: 2024-04-11

## (undated) RX ORDER — LIDOCAINE HYDROCHLORIDE 20 MG/ML
JELLY TOPICAL
Status: DISPENSED
Start: 2025-05-08

## (undated) RX ORDER — LIDOCAINE HYDROCHLORIDE 20 MG/ML
JELLY TOPICAL
Status: DISPENSED
Start: 2025-07-08